# Patient Record
Sex: FEMALE | Race: WHITE | NOT HISPANIC OR LATINO | Employment: UNEMPLOYED | URBAN - METROPOLITAN AREA
[De-identification: names, ages, dates, MRNs, and addresses within clinical notes are randomized per-mention and may not be internally consistent; named-entity substitution may affect disease eponyms.]

---

## 2017-01-13 ENCOUNTER — HOSPITAL ENCOUNTER (OUTPATIENT)
Dept: RADIOLOGY | Facility: HOSPITAL | Age: 67
Discharge: HOME | End: 2017-01-13
Attending: OTOLARYNGOLOGY
Payer: MEDICARE

## 2017-01-13 DIAGNOSIS — R51.9 FACIAL PAIN: ICD-10-CM

## 2017-01-13 PROCEDURE — 70486 CT MAXILLOFACIAL W/O DYE: CPT | Mod: TC

## 2017-01-13 PROCEDURE — 70486 CT MAXILLOFACIAL W/O DYE: CPT | Mod: 26,,, | Performed by: RADIOLOGY

## 2017-01-19 RX ORDER — SUMATRIPTAN 50 MG/1
TABLET, FILM COATED ORAL
Qty: 10 TABLET | Refills: 2 | Status: SHIPPED | OUTPATIENT
Start: 2017-01-19 | End: 2017-02-03 | Stop reason: SDUPTHER

## 2017-01-27 RX ORDER — LEVOTHYROXINE SODIUM 75 UG/1
TABLET ORAL
Qty: 90 TABLET | Refills: 1 | Status: SHIPPED | OUTPATIENT
Start: 2017-01-27 | End: 2017-05-04 | Stop reason: SDUPTHER

## 2017-02-03 RX ORDER — SUMATRIPTAN 50 MG/1
TABLET, FILM COATED ORAL
Qty: 10 TABLET | Refills: 2 | Status: SHIPPED | OUTPATIENT
Start: 2017-02-03 | End: 2019-09-12

## 2017-02-03 NOTE — TELEPHONE ENCOUNTER
----- Message from Blaze Jaeger sent at 2/3/2017  4:09 PM CST -----  Contact: PT  PT has an appt on 6/12/17 she is not feeling well ,she would like to come in sooner.     She has unbalance, dizziness, and headaches. She also need a refill on butalbital-acetaminophen-caffeine.    Please call: [phone]

## 2017-02-08 ENCOUNTER — OFFICE VISIT (OUTPATIENT)
Dept: NEUROLOGY | Facility: CLINIC | Age: 67
End: 2017-02-08
Payer: MEDICARE

## 2017-02-08 VITALS — HEIGHT: 61 IN | BODY MASS INDEX: 24.31 KG/M2 | WEIGHT: 128.75 LBS

## 2017-02-08 DIAGNOSIS — G43.009 MIGRAINE WITHOUT AURA AND WITHOUT STATUS MIGRAINOSUS, NOT INTRACTABLE: Primary | ICD-10-CM

## 2017-02-08 PROCEDURE — 99999 PR PBB SHADOW E&M-EST. PATIENT-LVL III: CPT | Mod: PBBFAC,,, | Performed by: NEUROMUSCULOSKELETAL MEDICINE & OMM

## 2017-02-08 PROCEDURE — 1157F ADVNC CARE PLAN IN RCRD: CPT | Mod: S$GLB,,, | Performed by: NEUROMUSCULOSKELETAL MEDICINE & OMM

## 2017-02-08 PROCEDURE — 99214 OFFICE O/P EST MOD 30 MIN: CPT | Mod: S$GLB,,, | Performed by: NEUROMUSCULOSKELETAL MEDICINE & OMM

## 2017-02-08 PROCEDURE — 1126F AMNT PAIN NOTED NONE PRSNT: CPT | Mod: S$GLB,,, | Performed by: NEUROMUSCULOSKELETAL MEDICINE & OMM

## 2017-02-08 PROCEDURE — 1160F RVW MEDS BY RX/DR IN RCRD: CPT | Mod: S$GLB,,, | Performed by: NEUROMUSCULOSKELETAL MEDICINE & OMM

## 2017-02-08 PROCEDURE — 99499 UNLISTED E&M SERVICE: CPT | Mod: S$GLB,,, | Performed by: NEUROMUSCULOSKELETAL MEDICINE & OMM

## 2017-02-08 PROCEDURE — 1159F MED LIST DOCD IN RCRD: CPT | Mod: S$GLB,,, | Performed by: NEUROMUSCULOSKELETAL MEDICINE & OMM

## 2017-02-08 NOTE — PROGRESS NOTES
This history is dictated on [employer] word recognition program. There are word recognition mistakes that are occasionally missed on review.  History: Patient presents for follow-up for her headaches.  She's had 3 episodes in January of severe headaches 8/10 with associated vomiting.  Imitrex has not been helping as it used to.  She describes back of the head pain associated with a stuffy nose for which she uses saline nose spray.  Last 3 days she's been better and not as dizzy.  She notes that she had been cut back on the Premarin to half dose since the last 3 days as when she went back on the full dose of Premarin which seemed to reduce the headaches.  She was given a prescription for amitriptyline 10 mg by her OB/GYN doctor for the hot flashes however she has not started that yet.  She did not realize that this was a migraine prevention medicine and would probably benefit her in terms of reducing the migraines.  Sinus CT scan was negative other than showing maxillary sinus cyst which she will address with her ENT doctor.    Previous note 12-7-16: Patient presents for follow-up after several years. She complains of a bad headache last week with pressure on top of the head and a feeling of off balance to the right. Headache was associated with nausea and vomiting with intensity of the headache up to 9/10. She also has sinus problems which she thinks may trigger the headaches. Imitrex does help. She also has neck problems on a chronic basis. She has some Flexeril tablets which I've encouraged her to try for the neck problems. She is scheduled to see the ENT doctors for her sinus problems.            Gen. Appearance: Well-developed with no obvious deformities  Carotid arteries symmetrical pulses  Peripheral vascular shows symmetrical pulses with no obvious edema or tenderness  Neurological Exam:      Motor examination: Upper and Lower extremities - Normal and symmetrical;muscle tone was normal ;  right-handed  Sensory examination:Upper and lower extremities - Pinprick and soft touch were normal and symmetrical. Vibration sense was normal at the toes for age 15-20 seconds  Deep tendon reflexes were 1-2+ symmetrical. Both plantar responses were flexor  Cerebellar examination upper: Normal finger to nose and rapid alternating movements  Gait: Steady with no ataxia; heel and toe walk normal  Romberg test: negative Tandem gait: Normal   Involuntary movements: Negative  Cervical examination: Full range of motion with no pain Cervical tenderness:negative  Lumbar examination: Low back tenderness-negative Sciatic notch tenderness-negative Straight leg raising test-negative      Impression:Migraine headaches-rule out hormonal related to the Premarin dosage cutbacks; cervical syndrome; sinus headaches  Recommendations/plan: Start amitriptyline 10 mg at night with option to increase to 20 mg after 2 weeks.  Continue Imitrex 50- 100 mg for the severe headaches.  ; Followup in 6 months

## 2017-02-20 RX ORDER — AMLODIPINE BESYLATE AND OLMESARTAN MEDOXOMIL 5; 40 MG/1; MG/1
TABLET, FILM COATED ORAL
Qty: 90 TABLET | Refills: 0 | Status: SHIPPED | OUTPATIENT
Start: 2017-02-20 | End: 2017-06-01 | Stop reason: SDUPTHER

## 2017-02-23 ENCOUNTER — TELEPHONE (OUTPATIENT)
Dept: FAMILY MEDICINE | Facility: CLINIC | Age: 67
End: 2017-02-23

## 2017-02-23 NOTE — TELEPHONE ENCOUNTER
Patient called about PA on her DAYNA. 5-40  We did not receive a PA on this medication. Patient states what is she to do..Please advise Jonna [phone]

## 2017-02-23 NOTE — TELEPHONE ENCOUNTER
----- Message from Dawn Cisse sent at 2/22/2017 11:50 AM CST -----  Contact: self  Pt calling to request a PA for DAYNA 5-40 mg per tablet for [employer] [facility] .    Thanks .

## 2017-02-24 ENCOUNTER — TELEPHONE (OUTPATIENT)
Dept: FAMILY MEDICINE | Facility: CLINIC | Age: 67
End: 2017-02-24

## 2017-02-24 NOTE — TELEPHONE ENCOUNTER
----- Message from Mariaa Gaspar sent at 2/23/2017 12:54 PM CST -----  Contact: self  Pt is requesting a call back concerning a PA for DAYNA 5-40 mg per tablet. [phone]

## 2017-02-24 NOTE — TELEPHONE ENCOUNTER
Advise patient the medication this year must not be covered.  She probably needs to call her insurance and find out if that is the case.    The reason this is important is that if indeed it's not covered, there is no justification we can put on the prior authorization that will lead to an approval.  Next    The medication is 2 different blood pressure medication and we might be able to call in prescriptions separately

## 2017-03-30 ENCOUNTER — OFFICE VISIT (OUTPATIENT)
Dept: FAMILY MEDICINE | Facility: CLINIC | Age: 67
End: 2017-03-30
Payer: MEDICARE

## 2017-03-30 VITALS
HEIGHT: 61 IN | HEART RATE: 66 BPM | RESPIRATION RATE: 16 BRPM | DIASTOLIC BLOOD PRESSURE: 70 MMHG | OXYGEN SATURATION: 97 % | SYSTOLIC BLOOD PRESSURE: 108 MMHG | TEMPERATURE: 98 F | BODY MASS INDEX: 24.58 KG/M2 | WEIGHT: 130.19 LBS

## 2017-03-30 DIAGNOSIS — J01.90 ACUTE BACTERIAL RHINOSINUSITIS: Primary | ICD-10-CM

## 2017-03-30 DIAGNOSIS — B96.89 ACUTE BACTERIAL RHINOSINUSITIS: Primary | ICD-10-CM

## 2017-03-30 PROCEDURE — 1159F MED LIST DOCD IN RCRD: CPT | Mod: S$GLB,,, | Performed by: NURSE PRACTITIONER

## 2017-03-30 PROCEDURE — 1157F ADVNC CARE PLAN IN RCRD: CPT | Mod: S$GLB,,, | Performed by: NURSE PRACTITIONER

## 2017-03-30 PROCEDURE — 99214 OFFICE O/P EST MOD 30 MIN: CPT | Mod: S$GLB,,, | Performed by: NURSE PRACTITIONER

## 2017-03-30 PROCEDURE — 1126F AMNT PAIN NOTED NONE PRSNT: CPT | Mod: S$GLB,,, | Performed by: NURSE PRACTITIONER

## 2017-03-30 PROCEDURE — 3074F SYST BP LT 130 MM HG: CPT | Mod: S$GLB,,, | Performed by: NURSE PRACTITIONER

## 2017-03-30 PROCEDURE — 3078F DIAST BP <80 MM HG: CPT | Mod: S$GLB,,, | Performed by: NURSE PRACTITIONER

## 2017-03-30 PROCEDURE — 1160F RVW MEDS BY RX/DR IN RCRD: CPT | Mod: S$GLB,,, | Performed by: NURSE PRACTITIONER

## 2017-03-30 PROCEDURE — 99999 PR PBB SHADOW E&M-EST. PATIENT-LVL V: CPT | Mod: PBBFAC,,, | Performed by: NURSE PRACTITIONER

## 2017-03-30 RX ORDER — AMOXICILLIN AND CLAVULANATE POTASSIUM 875; 125 MG/1; MG/1
1 TABLET, FILM COATED ORAL 2 TIMES DAILY
Qty: 20 TABLET | Refills: 0 | Status: SHIPPED | OUTPATIENT
Start: 2017-03-30 | End: 2017-04-09

## 2017-03-30 RX ORDER — METHYLPREDNISOLONE 4 MG/1
TABLET ORAL
Qty: 1 PACKAGE | Refills: 0 | Status: SHIPPED | OUTPATIENT
Start: 2017-03-30 | End: 2017-06-20

## 2017-03-30 NOTE — PROGRESS NOTES
Patient Name: Aleksandra Santana    : 1950  MRN: [id number]    Subjective:  Aleksandra is a 66 y.o. female who presents today for     1. Sore throat, headache and pressure around the eyes x 4-5 days. She has chronic recurrent sinusitis, follow by ENT, recent CT scan 2017 reveal maxillary retention cyst. Taking tylenol and advil as well as xyzal without improvement.    Past Medical History  Past Medical History:   Diagnosis Date    Abnormal EKG     Anxiety     Cataract     GERD (gastroesophageal reflux disease)     History of colonic polyps     1 polyp  --5 yrs.    Hypertension     Hypothyroidism     IBS (irritable bowel syndrome)     Migraine syndrome     OA (osteoarthritis)     Osteopenia     BMD  --no tx -repeat 2 yrs    Osteoporosis, postmenopausal     Palpitations     SOB (shortness of breath)        Past Surgical History  Past Surgical History:   Procedure Laterality Date    APPENDECTOMY       SECTION      x2    HYSTERECTOMY  age 33    COLEEN/BSO for endometriosis    TONSILLECTOMY         Family History  Family History   Problem Relation Age of Onset    Cataracts Mother     No Known Problems Father     No Known Problems Sister     No Known Problems Brother     No Known Problems Maternal Aunt     No Known Problems Maternal Uncle     No Known Problems Paternal Aunt     No Known Problems Paternal Uncle     No Known Problems Maternal Grandmother     No Known Problems Maternal Grandfather     No Known Problems Paternal Grandmother     No Known Problems Paternal Grandfather     Colon cancer Neg Hx     Breast cancer Neg Hx     Ovarian cancer Neg Hx        Social History  Social History     Social History    Marital status:      Spouse name: N/A    Number of children: N/A    Years of education: N/A     Occupational History    Not on file.     Social History Main Topics    Smoking status: Never Smoker    Smokeless tobacco: Not on file    Alcohol use 0.6  oz/week     1 Glasses of wine per week      Comment: ocasionally    Drug use: No    Sexual activity: Not Currently     Partners: Male     Birth control/ protection: None, Post-menopausal     Other Topics Concern    Not on file     Social History Narrative       Allergies  Review of patient's allergies indicates:   Allergen Reactions    Codeine     Iodine and iodide containing products     Iodinated contrast media - iv dye      Rash (skin)^    Doxycycline Rash    -reviewed and updated      Medications  Reviewed and updated.   Current Outpatient Prescriptions   Medication Sig Dispense Refill    DAYNA 5-40 mg per tablet TAKE ONE TABLET BY MOUTH EVERY DAY 90 tablet 0    carvedilol (COREG) 25 MG tablet TAKE ONE TABLET BY MOUTH EVERY DAY 30 tablet 12    folic acid (FOLVITE) 1 MG tablet Take 1 tablet (1,000 mcg total) by mouth once daily. 90 tablet 4    hydrochlorothiazide (HYDRODIURIL) 25 MG tablet Take 1 tablet (25 mg total) by mouth once daily. 90 tablet 4    omeprazole (PRILOSEC) 20 MG capsule Take 1 capsule (20 mg total) by mouth before breakfast. 90 capsule 3    PREMARIN 1.25 mg tablet TAKE ONE TABLET BY MOUTH EVERY DAY 30 tablet 6    SYNTHROID 75 mcg tablet TAKE ONE TABLET BY MOUTH EVERY DAY 90 tablet 1    amitriptyline (ELAVIL) 10 MG tablet 10 mg nightly x 7 days.  Add 10 mg every week until symptoms better.  Max 50 mg nightly. 60 tablet 11    amoxicillin-clavulanate 875-125mg (AUGMENTIN) 875-125 mg per tablet Take 1 tablet by mouth 2 (two) times daily. 20 tablet 0    azelastine (ASTELIN) 137 mcg (0.1 %) nasal spray USE 1-2 SPRAYS IN EACH NOSTRIL TWICE DAILY AS NEEDED RHINITIS 30 mL 5    butalbital-acetaminophen-caffeine -40 mg (FIORICET, ESGIC) -40 mg per tablet Take 1 tablet by mouth every 6 (six) hours as needed for Pain. 30 tablet 3    conjugated estrogens (PREMARIN) vaginal cream Place 0.5 g vaginally twice a week. 30 g 11    fluticasone (FLONASE) 50 mcg/actuation nasal spray 2  "sprays by Each Nare route once daily. 1 Bottle 5    levocetirizine (XYZAL) 5 MG tablet       meclizine (ANTIVERT) 25 mg tablet Take 1 tablet (25 mg total) by mouth 3 (three) times daily as needed for Dizziness or Nausea. 30 tablet 0    meloxicam (MOBIC) 15 MG tablet TAKE ONE TABLET BY MOUTH EVERY DAY 30 tablet 2    methylPREDNISolone (MEDROL DOSEPACK) 4 mg tablet use as directed 1 Package 0    sumatriptan (IMITREX) 50 MG tablet Take one tablet at onset of severe migraine.  May repeat in 2 hours but not to exceed 2 tablets in 24 hours 10 tablet 2     No current facility-administered medications for this visit.          Review of Systems   Constitutional: Positive for chills, fever and malaise/fatigue.   HENT: Positive for congestion and sore throat. Negative for ear pain.    Respiratory: Positive for cough. Negative for sputum production and shortness of breath.    Cardiovascular: Negative for chest pain.   Musculoskeletal: Positive for myalgias.   Neurological: Positive for headaches.         Physical Exam  /70 (BP Location: Left arm, Patient Position: Sitting, BP Method: Manual)  Pulse 66  Temp 97.7 °F (36.5 °C) (Oral)   Resp 16  Ht 5' 1" (1.549 m)  Wt 59.1 kg (130 lb 2.9 oz)  SpO2 97%  BMI 24.6 kg/m2  Physical Exam   Constitutional: She appears well-developed. No distress.   HENT:   Head: Normocephalic.   Right Ear: Tympanic membrane normal.   Left Ear: Tympanic membrane normal.   Nose: Mucosal edema present.   Mouth/Throat: No posterior oropharyngeal edema or posterior oropharyngeal erythema.   Cardiovascular: Normal rate, regular rhythm and normal heart sounds.    Pulmonary/Chest: Effort normal and breath sounds normal.   Skin: She is not diaphoretic.         Assessment/Plan:  Aleksandra Santana is a 66 y.o. female who presents today for :    Acute bacterial rhinosinusitis  Continue xyzal, flonase astelin to reduce recurrence, start abx  -     amoxicillin-clavulanate 875-125mg (AUGMENTIN) 875-125 " mg per tablet; Take 1 tablet by mouth 2 (two) times daily.  Dispense: 20 tablet; Refill: 0  -     methylPREDNISolone (MEDROL DOSEPACK) 4 mg tablet; use as directed  Dispense: 1 Package; Refill: 0        Return if symptoms worsen or fail to improve in 3-5 days with ENT.

## 2017-03-30 NOTE — MR AVS SNAPSHOT
[facility]  [address]  [place] [address]  Phone: [phone]  Fax: [phone]                  Aleksandra Santana   3/30/2017 6:30 PM   Office Visit    Description:  Female : 1950   Provider:  Carline Larios NP   Department:  [facility]           Reason for Visit     Sore Throat           Diagnoses this Visit        Comments    Acute bacterial rhinosinusitis    -  Primary            To Do List           Future Appointments        Provider Department Dept Phone    2017 10:40 AM Ilya Carrington MD [facility] - Neurology [phone]      Goals (5 Years of Data)     None      Follow-Up and Disposition     Return if symptoms worsen or fail to improve in 3-5 days.       These Medications        Disp Refills Start End    amoxicillin-clavulanate 875-125mg (AUGMENTIN) 875-125 mg per tablet 20 tablet 0 3/30/2017 2017    Take 1 tablet by mouth 2 (two) times daily. - Oral    Pharmacy: [facility] [address] Ph #: [phone]       methylPREDNISolone (MEDROL DOSEPACK) 4 mg tablet 1 Package 0 3/30/2017     use as directed    Pharmacy: [address] Ph #: [phone]         [facility]     [facility] Care Line - 24/ Assistance  Unless otherwise directed by your provider, please contact [facility], our nurse care line that is available for 24/7 assistance.     Registered nurses in the [facility] provide: appointment scheduling, clinical advisement, health education, and other advisory services.  Call: [phone] (toll free)               Medications           Message regarding Medications     Verify the changes and/or additions to your medication regime listed below are the same as discussed with your clinician today.  If any of these changes or additions are incorrect, please notify your healthcare provider.        START taking  these NEW medications        Refills    amoxicillin-clavulanate 875-125mg (AUGMENTIN) 875-125 mg per tablet 0    Sig: Take 1 tablet by mouth 2 (two) times daily.    Class: Print    Route: Oral    methylPREDNISolone (MEDROL DOSEPACK) 4 mg tablet 0    Sig: use as directed    Class: Print      STOP taking these medications     butalbital-aspirin-caffeine -40 mg (FIORINAL) -40 mg Cap Take 1 capsule by mouth.    promethazine (PHENERGAN) 12.5 MG Tab Take 1 tablet (12.5 mg total) by mouth every 6 (six) hours as needed.           Verify that the below list of medications is an accurate representation of the medications you are currently taking.  If none reported, the list may be blank. If incorrect, please contact your healthcare provider. Carry this list with you in case of emergency.           Current Medications     DAYNA 5-40 mg per tablet TAKE ONE TABLET BY MOUTH EVERY DAY    carvedilol (COREG) 25 MG tablet TAKE ONE TABLET BY MOUTH EVERY DAY    folic acid (FOLVITE) 1 MG tablet Take 1 tablet (1,000 mcg total) by mouth once daily.    hydrochlorothiazide (HYDRODIURIL) 25 MG tablet Take 1 tablet (25 mg total) by mouth once daily.    omeprazole (PRILOSEC) 20 MG capsule Take 1 capsule (20 mg total) by mouth before breakfast.    PREMARIN 1.25 mg tablet TAKE ONE TABLET BY MOUTH EVERY DAY    SYNTHROID 75 mcg tablet TAKE ONE TABLET BY MOUTH EVERY DAY    amitriptyline (ELAVIL) 10 MG tablet 10 mg nightly x 7 days.  Add 10 mg every week until symptoms better.  Max 50 mg nightly.    amoxicillin-clavulanate 875-125mg (AUGMENTIN) 875-125 mg per tablet Take 1 tablet by mouth 2 (two) times daily.    azelastine (ASTELIN) 137 mcg (0.1 %) nasal spray USE 1-2 SPRAYS IN EACH NOSTRIL TWICE DAILY AS NEEDED RHINITIS    butalbital-acetaminophen-caffeine -40 mg (FIORICET, ESGIC) -40 mg per tablet Take 1 tablet by mouth every 6 (six) hours as needed for Pain.    conjugated estrogens (PREMARIN) vaginal cream Place 0.5 g  "vaginally twice a week.    fluticasone (FLONASE) 50 mcg/actuation nasal spray 2 sprays by Each Nare route once daily.    levocetirizine (XYZAL) 5 MG tablet     meclizine (ANTIVERT) 25 mg tablet Take 1 tablet (25 mg total) by mouth 3 (three) times daily as needed for Dizziness or Nausea.    meloxicam (MOBIC) 15 MG tablet TAKE ONE TABLET BY MOUTH EVERY DAY    methylPREDNISolone (MEDROL DOSEPACK) 4 mg tablet use as directed    sumatriptan (IMITREX) 50 MG tablet Take one tablet at onset of severe migraine.  May repeat in 2 hours but not to exceed 2 tablets in 24 hours           Clinical Reference Information           Your Vitals Were     BP Pulse Temp Resp Height Weight    108/70 (BP Location: Left arm, Patient Position: Sitting, BP Method: Manual) 66 97.7 °F (36.5 °C) (Oral) 16 5' 1" (1.549 m) 59.1 kg (130 lb 2.9 oz)    SpO2 BMI             97% 24.6 kg/m2         Blood Pressure          Most Recent Value    BP  108/70      Allergies as of 3/30/2017     Codeine    Iodine And Iodide Containing Products    Iodinated Contrast Media - Iv Dye    Doxycycline      Immunizations Administered on Date of Encounter - 3/30/2017     None      Instructions      Sinusitis (Antibiotic Treatment)    The sinuses are air-filled spaces within the bones of the face. They connect to the inside of the nose. Sinusitis is an inflammation of the tissue lining the sinus cavity. Sinus inflammation can occur during a cold. It can also be due to allergies to pollens and other particles in the air. Sinusitis can cause symptoms of sinus congestion and fullness. A sinus infection causes fever, headache and facial pain. There is often green or yellow drainage from the nose or into the back of the throat (post-nasal drip). You have been given antibiotics to treat this condition.  Home care:  · Take the full course of antibiotics as instructed. Do not stop taking them, even if you feel better.  · Drink plenty of water, hot tea, and other liquids. This " may help thin mucus. It also may promote sinus drainage.  · Heat may help soothe painful areas of the face. Use a towel soaked in hot water. Or,  the shower and direct the hot spray onto your face. Using a vaporizer along with a menthol rub at night may also help.   · An expectorant containing guaifenesin may help thin the mucus and promote drainage from the sinuses.  · Over-the-counter decongestants may be used unless a similar medicine was prescribed. Nasal sprays work the fastest. Use one that contains phenylephrine or oxymetazoline. First blow the nose gently. Then use the spray. Do not use these medicines more often than directed on the label or symptoms may get worse. You may also use tablets containing pseudoephedrine. Avoid products that combine ingredients, because side effects may be increased. Read labels. You can also ask the pharmacist for help. (NOTE: Persons with high blood pressure should not use decongestants. They can raise blood pressure.)  · Over-the-counter antihistamines may help if allergies contributed to your sinusitis.    · Do not use nasal rinses or irrigation during an acute sinus infection, unless told to by your health care provider. Rinsing may spread the infection to other sinuses.  · Use acetaminophen or ibuprofen to control pain, unless another pain medicine was prescribed. (If you have chronic liver or kidney disease or ever had a stomach ulcer, talk with your doctor before using these medicines. Aspirin should never be used in anyone under 18 years of age who is ill with a fever. It may cause severe liver damage.)  · Don't smoke. This can worsen symptoms.  Follow-up care  Follow up with your healthcare provider or our staff if you are not improving within the next week.  When to seek medical advice  Call your healthcare provider if any of these occur:  · Facial pain or headache becoming more severe  · Stiff neck  · Unusual drowsiness or confusion  · Swelling of the  forehead or eyelids  · Vision problems, including blurred or double vision  · Fever of 100.4ºF (38ºC) or higher, or as directed by your healthcare provider  · Seizure  · Breathing problems  · Symptoms not resolving within 10 days  Date Last Reviewed: 4/13/2015 © 2000-2016 [employer]. [address] [place] [address], [place] [address]. All rights reserved. This information is not intended as a substitute for professional medical care. Always follow your healthcare professional's instructions.             Language Assistance Services     ATTENTION: Language assistance services are available, free of charge. Please call [phone].      ATENCIÓN: Si habla español, tiene a singh disposición servicios gratuitos de asistencia lingüística. Llame al [phone].     CHÚ Ý: N?u b?n nói Ti?ng Vi?t, có các d?ch v? h? tr? ngôn ng? mi?n phí dành cho b?n. G?i s? [phone].         [facility] complies with applicable Federal civil rights laws and does not discriminate on the basis of race, color, national origin, age, disability, or sex.

## 2017-03-30 NOTE — PATIENT INSTRUCTIONS
Sinusitis (Antibiotic Treatment)    The sinuses are air-filled spaces within the bones of the face. They connect to the inside of the nose. Sinusitis is an inflammation of the tissue lining the sinus cavity. Sinus inflammation can occur during a cold. It can also be due to allergies to pollens and other particles in the air. Sinusitis can cause symptoms of sinus congestion and fullness. A sinus infection causes fever, headache and facial pain. There is often green or yellow drainage from the nose or into the back of the throat (post-nasal drip). You have been given antibiotics to treat this condition.  Home care:  · Take the full course of antibiotics as instructed. Do not stop taking them, even if you feel better.  · Drink plenty of water, hot tea, and other liquids. This may help thin mucus. It also may promote sinus drainage.  · Heat may help soothe painful areas of the face. Use a towel soaked in hot water. Or,  the shower and direct the hot spray onto your face. Using a vaporizer along with a menthol rub at night may also help.   · An expectorant containing guaifenesin may help thin the mucus and promote drainage from the sinuses.  · Over-the-counter decongestants may be used unless a similar medicine was prescribed. Nasal sprays work the fastest. Use one that contains phenylephrine or oxymetazoline. First blow the nose gently. Then use the spray. Do not use these medicines more often than directed on the label or symptoms may get worse. You may also use tablets containing pseudoephedrine. Avoid products that combine ingredients, because side effects may be increased. Read labels. You can also ask the pharmacist for help. (NOTE: Persons with high blood pressure should not use decongestants. They can raise blood pressure.)  · Over-the-counter antihistamines may help if allergies contributed to your sinusitis.    · Do not use nasal rinses or irrigation during an acute sinus infection, unless told to by  your health care provider. Rinsing may spread the infection to other sinuses.  · Use acetaminophen or ibuprofen to control pain, unless another pain medicine was prescribed. (If you have chronic liver or kidney disease or ever had a stomach ulcer, talk with your doctor before using these medicines. Aspirin should never be used in anyone under 18 years of age who is ill with a fever. It may cause severe liver damage.)  · Don't smoke. This can worsen symptoms.  Follow-up care  Follow up with your healthcare provider or our staff if you are not improving within the next week.  When to seek medical advice  Call your healthcare provider if any of these occur:  · Facial pain or headache becoming more severe  · Stiff neck  · Unusual drowsiness or confusion  · Swelling of the forehead or eyelids  · Vision problems, including blurred or double vision  · Fever of 100.4ºF (38ºC) or higher, or as directed by your healthcare provider  · Seizure  · Breathing problems  · Symptoms not resolving within 10 days  Date Last Reviewed: 4/13/2015  © 5896-9213 [employer]. [address] [place] [address], [place] [address]. All rights reserved. This information is not intended as a substitute for professional medical care. Always follow your healthcare professional's instructions.

## 2017-04-07 ENCOUNTER — TELEPHONE (OUTPATIENT)
Dept: FAMILY MEDICINE | Facility: CLINIC | Age: 67
End: 2017-04-07

## 2017-04-07 ENCOUNTER — NURSE TRIAGE (OUTPATIENT)
Dept: ADMINISTRATIVE | Facility: CLINIC | Age: 67
End: 2017-04-07

## 2017-04-07 DIAGNOSIS — B96.89 ACUTE BACTERIAL RHINOSINUSITIS: Primary | ICD-10-CM

## 2017-04-07 DIAGNOSIS — J01.90 ACUTE BACTERIAL RHINOSINUSITIS: Primary | ICD-10-CM

## 2017-04-07 RX ORDER — LEVOFLOXACIN 500 MG/1
500 TABLET, FILM COATED ORAL DAILY
Qty: 7 TABLET | Refills: 0 | Status: SHIPPED | OUTPATIENT
Start: 2017-04-07 | End: 2017-04-14

## 2017-04-07 NOTE — TELEPHONE ENCOUNTER
----- Message from Shruthi Odell sent at 4/7/2017  2:37 PM CDT -----  Contact: Self  Patient says she went to the walk in Clinic and the medication she was given is not working . Please call [phone]

## 2017-04-07 NOTE — TELEPHONE ENCOUNTER
Reason for Disposition   Caller has cancelled the call before the first contact.    Protocols used: ST NO CONTACT OR DUPLICATE CONTACT CALL-A-AH    Aleksandra does not want to speak with triage nurse.  She was trying to reach Carline Larios NP in [place] [facility].  She left two calls for her and is waiting for a call back from her to discuss trying another antibiotic.  Declined offer of triage or appointment set for her.  Message to Ms Larios.  Please contact caller directly with any additional care advice.

## 2017-04-07 NOTE — TELEPHONE ENCOUNTER
Reason for Disposition   Caller has already spoken with another triager or PCP AND has further questions AND triager able to answer questions.    Protocols used: ST NO CONTACT OR DUPLICATE CONTACT CALL-A-AH    Patient cannot take medication ordered today in clinic. Another triage nurse took the call and sent a message to the provider for assistance.

## 2017-04-11 ENCOUNTER — TELEPHONE (OUTPATIENT)
Dept: FAMILY MEDICINE | Facility: CLINIC | Age: 67
End: 2017-04-11

## 2017-04-11 RX ORDER — AMOXICILLIN 875 MG/1
875 TABLET, FILM COATED ORAL 2 TIMES DAILY
Qty: 20 TABLET | Refills: 0 | Status: SHIPPED | OUTPATIENT
Start: 2017-04-11 | End: 2017-04-21

## 2017-04-11 NOTE — TELEPHONE ENCOUNTER
----- Message from Yg Hinkle sent at 4/10/2017  4:24 PM CDT -----  Contact: Self  Pt states amoxicillin-clavulanate 875-125mg (AUGMENTIN) 875-125 mg per tablet is too strong. Pt wants to get Rx for amoxiciilin 800 MG.  Pt states she does not want to take levoFLOXacin (LEVAQUIN) 500 MG tablet.  Pt would like script sent to [facility]. /[phone]. Pt can be reached @ [phone]

## 2017-04-11 NOTE — PROGRESS NOTES
Pt reports augmentin upset stomach, has not started levaquin, switch to amoxil 875 mg bid x 10 days, sent to pharmacy

## 2017-04-11 NOTE — TELEPHONE ENCOUNTER
Left VM informing pt we have tried to reach her back without success. Levaquin was sent into pharmacy Friday 4/7. Pt to return call if she has persistent problems or concerns

## 2017-05-05 RX ORDER — LEVOTHYROXINE SODIUM 75 UG/1
TABLET ORAL
Qty: 90 TABLET | Refills: 1 | Status: SHIPPED | OUTPATIENT
Start: 2017-05-05 | End: 2018-04-19 | Stop reason: SDUPTHER

## 2017-05-31 ENCOUNTER — TELEPHONE (OUTPATIENT)
Dept: FAMILY MEDICINE | Facility: CLINIC | Age: 67
End: 2017-05-31

## 2017-05-31 DIAGNOSIS — I10 ESSENTIAL HYPERTENSION: ICD-10-CM

## 2017-05-31 DIAGNOSIS — Z00.00 ROUTINE MEDICAL EXAM: Primary | ICD-10-CM

## 2017-05-31 DIAGNOSIS — E03.9 HYPOTHYROIDISM, UNSPECIFIED TYPE: ICD-10-CM

## 2017-05-31 DIAGNOSIS — K59.09 CHRONIC CONSTIPATION: ICD-10-CM

## 2017-05-31 DIAGNOSIS — E55.9 VITAMIN D DEFICIENCY DISEASE: ICD-10-CM

## 2017-06-01 ENCOUNTER — HOSPITAL ENCOUNTER (OUTPATIENT)
Dept: RADIOLOGY | Facility: HOSPITAL | Age: 67
Discharge: HOME | End: 2017-06-01
Attending: ORTHOPAEDIC SURGERY
Payer: MEDICARE

## 2017-06-01 ENCOUNTER — OFFICE VISIT (OUTPATIENT)
Dept: ORTHOPEDICS | Facility: CLINIC | Age: 67
End: 2017-06-01
Payer: MEDICARE

## 2017-06-01 VITALS
SYSTOLIC BLOOD PRESSURE: 148 MMHG | HEART RATE: 62 BPM | DIASTOLIC BLOOD PRESSURE: 69 MMHG | BODY MASS INDEX: 24.6 KG/M2 | WEIGHT: 130.31 LBS | HEIGHT: 61 IN | RESPIRATION RATE: 16 BRPM

## 2017-06-01 DIAGNOSIS — G54.2 CERVICAL SYNDROME: ICD-10-CM

## 2017-06-01 DIAGNOSIS — M25.522 LEFT ELBOW PAIN: ICD-10-CM

## 2017-06-01 DIAGNOSIS — M25.522 LEFT ELBOW PAIN: Primary | ICD-10-CM

## 2017-06-01 DIAGNOSIS — M89.8X1 PAIN OF LEFT SCAPULA: ICD-10-CM

## 2017-06-01 PROCEDURE — 73080 X-RAY EXAM OF ELBOW: CPT | Mod: TC,LT

## 2017-06-01 PROCEDURE — 99999 PR PBB SHADOW E&M-EST. PATIENT-LVL V: CPT | Mod: PBBFAC,,, | Performed by: PHYSICIAN ASSISTANT

## 2017-06-01 PROCEDURE — 99213 OFFICE O/P EST LOW 20 MIN: CPT | Mod: S$GLB,,, | Performed by: PHYSICIAN ASSISTANT

## 2017-06-01 PROCEDURE — 73010 X-RAY EXAM OF SHOULDER BLADE: CPT | Mod: 26,LT,, | Performed by: RADIOLOGY

## 2017-06-01 PROCEDURE — 73080 X-RAY EXAM OF ELBOW: CPT | Mod: 26,LT,, | Performed by: RADIOLOGY

## 2017-06-01 PROCEDURE — 73010 X-RAY EXAM OF SHOULDER BLADE: CPT | Mod: TC,LT

## 2017-06-01 PROCEDURE — 1159F MED LIST DOCD IN RCRD: CPT | Mod: S$GLB,,, | Performed by: PHYSICIAN ASSISTANT

## 2017-06-01 PROCEDURE — 1125F AMNT PAIN NOTED PAIN PRSNT: CPT | Mod: S$GLB,,, | Performed by: PHYSICIAN ASSISTANT

## 2017-06-01 RX ORDER — CELECOXIB 200 MG/1
200 CAPSULE ORAL 2 TIMES DAILY
Qty: 60 CAPSULE | Refills: 1 | Status: SHIPPED | OUTPATIENT
Start: 2017-06-01 | End: 2018-04-18 | Stop reason: SDUPTHER

## 2017-06-01 RX ORDER — AMLODIPINE BESYLATE AND OLMESARTAN MEDOXOMIL 5; 40 MG/1; MG/1
TABLET, FILM COATED ORAL
Qty: 90 TABLET | Refills: 1 | Status: SHIPPED | OUTPATIENT
Start: 2017-06-01 | End: 2017-06-20

## 2017-06-01 RX ORDER — AMLODIPINE AND OLMESARTAN MEDOXOMIL 5; 40 MG/1; MG/1
1 TABLET ORAL DAILY
Qty: 90 TABLET | Refills: 0 | Status: SHIPPED | OUTPATIENT
Start: 2017-06-01 | End: 2018-02-02 | Stop reason: SDUPTHER

## 2017-06-01 NOTE — PROGRESS NOTES
Chief Complaint & History of Present Illness:  Aleksandra Santana is a Established patient 67 y.o. female who is seen here today with a complaint of    Chief Complaint   Patient presents with    Left Elbow - Pain    Left Shoulder - Pain    .  She reports she's developed pain and tenderness in the shoulder radiating down the arm to and past the elbow over the past several weeks.  There's been no specific trauma or injury to the area.  She was seen by an outside orthopedist last week and has undergone cervical spine x-rays as well as shoulder and elbow.  She received a cortisone injection of the scapular region but has had little relief from this  On a scale of 1-10, with 10 being worst pain imaginable, he rates this pain as 4 on good days and 7 on bad days.  she describes the pain as tender and sore.    Review of patient's allergies indicates:   Allergen Reactions    Codeine     Iodine and iodide containing products     Iodinated contrast media - oral and iv dye      Rash (skin)^    Doxycycline Rash         Current Outpatient Prescriptions   Medication Sig Dispense Refill    amitriptyline (ELAVIL) 10 MG tablet 10 mg nightly x 7 days.  Add 10 mg every week until symptoms better.  Max 50 mg nightly. 60 tablet 11    amlodipine-olmesartan (DAYNA) 5-40 mg per tablet Take 1 tablet by mouth once daily. 90 tablet 0    azelastine (ASTELIN) 137 mcg (0.1 %) nasal spray USE 1-2 SPRAYS IN EACH NOSTRIL TWICE DAILY AS NEEDED RHINITIS 30 mL 5    DAYNA 5-40 mg per tablet TAKE ONE TABLET BY MOUTH EVERY DAY 90 tablet 1    butalbital-acetaminophen-caffeine -40 mg (FIORICET, ESGIC) -40 mg per tablet Take 1 tablet by mouth every 6 (six) hours as needed for Pain. 30 tablet 3    carvedilol (COREG) 25 MG tablet TAKE ONE TABLET BY MOUTH EVERY DAY 30 tablet 12    conjugated estrogens (PREMARIN) vaginal cream Place 0.5 g vaginally twice a week. 30 g 11    fluticasone (FLONASE) 50 mcg/actuation nasal spray 2 sprays by Each  Nare route once daily. 1 Bottle 5    folic acid (FOLVITE) 1 MG tablet Take 1 tablet (1,000 mcg total) by mouth once daily. 90 tablet 4    hydrochlorothiazide (HYDRODIURIL) 25 MG tablet Take 1 tablet (25 mg total) by mouth once daily. 90 tablet 4    levocetirizine (XYZAL) 5 MG tablet       meclizine (ANTIVERT) 25 mg tablet Take 1 tablet (25 mg total) by mouth 3 (three) times daily as needed for Dizziness or Nausea. 30 tablet 0    methylPREDNISolone (MEDROL DOSEPACK) 4 mg tablet use as directed 1 Package 0    omeprazole (PRILOSEC) 20 MG capsule Take 1 capsule (20 mg total) by mouth before breakfast. 90 capsule 3    PREMARIN 1.25 mg tablet TAKE ONE TABLET BY MOUTH EVERY DAY 30 tablet 6    sumatriptan (IMITREX) 50 MG tablet Take one tablet at onset of severe migraine.  May repeat in 2 hours but not to exceed 2 tablets in 24 hours 10 tablet 2    SYNTHROID 75 mcg tablet TAKE ONE TABLET BY MOUTH EVERY DAY 90 tablet 1    celecoxib (CELEBREX) 200 MG capsule Take 1 capsule (200 mg total) by mouth 2 (two) times daily. 60 capsule 1     No current facility-administered medications for this visit.        Past Medical History:   Diagnosis Date    Abnormal EKG     Anxiety     Cataract     GERD (gastroesophageal reflux disease)     History of colonic polyps     1 polyp  --5 yrs.    Hypertension     Hypothyroidism     IBS (irritable bowel syndrome)     Migraine syndrome     OA (osteoarthritis)     Osteopenia     BMD  --no tx -repeat 2 yrs    Osteoporosis, postmenopausal     Palpitations     SOB (shortness of breath)        Past Surgical History:   Procedure Laterality Date    APPENDECTOMY       SECTION      x2    HYSTERECTOMY  age 33    COLEEN/BSO for endometriosis    TONSILLECTOMY         Vital Signs (Most Recent)  Vitals:    17 1600   BP: (!) 148/69   Pulse: 62   Resp: 16           Review of Systems:  ROS:  Constitutional: no fever or chills  Eyes: no visual changes  ENT: no nasal  "congestion or sore throat  Respiratory: no cough or shortness of breath  Cardiovascular: no chest pain or palpitations  Gastrointestinal: no nausea or vomiting, tolerating diet  Genitourinary: no hematuria or dysuria  Integument/Breast: no rash or pruritis  Hematologic/Lymphatic: no easy bruising or lymphadenopathy  Musculoskeletal: no arthralgias or myalgias  Neurological: no seizures or tremors  Behavioral/Psych: no auditory or visual hallucinations  Endocrine: no heat or cold intolerance              OBJECTIVE:     PHYSICAL EXAM:  Height: 5' 1" (154.9 cm) Weight: 59.1 kg (130 lb 4.7 oz), General Appearance: Well nourished, well developed, in no acute distress.  Neurological: Mood & affect are normal.  left Elbow:   History of injury: no  Pain: Description: moderate and intermittent  Night pain: yes and if sleeps on affected side  Rest pain: yes and moderate  Quality: sharp and tender  Location: diffuse and medial  Radiates to: arm and neck  Mass/swelling: None  Neurological complaints: none and numbness intermittently in the hand  medial epicondylar tenderness, positive Cozen  ROM: flexion arc 0-145  Strength: flexion grade 5 of 5 and extension grade 5 of 5      RADIOGRAPHS:  X-rays of the scapula and elbow taken today films reviewed by me demonstrate no evidence of fracture dislocation or advanced degenerative joint disease throughout the joints no evidence of a loss of joint space or damage normal x-rays    ASSESSMENT/PLAN:     Plan: We discussed with the patient at length all the different treatment options available for her elbow including anti-inflammatories, acetaminophen, rest, ice, physical therapy to include strengthening, range of motion exercise,  splinting,  occasional cortisone injections for temporary relief,  or possible surgical interventions.  It appears her cervical spine is a origin of her pain is been reinforced by outside orthopedic surgeon for the short-term we will prescribe a Celebrex for " inflammation and tenderness compounded pain cream for the elbow pain consult to spine service for evaluation of cervical spine and treatment accordingly

## 2017-06-19 ENCOUNTER — OFFICE VISIT (OUTPATIENT)
Dept: NEUROLOGY | Facility: CLINIC | Age: 67
End: 2017-06-19
Payer: MEDICARE

## 2017-06-19 VITALS
HEIGHT: 61 IN | SYSTOLIC BLOOD PRESSURE: 123 MMHG | WEIGHT: 129.19 LBS | DIASTOLIC BLOOD PRESSURE: 64 MMHG | HEART RATE: 67 BPM | BODY MASS INDEX: 24.39 KG/M2

## 2017-06-19 DIAGNOSIS — G43.009 MIGRAINE WITHOUT AURA AND WITHOUT STATUS MIGRAINOSUS, NOT INTRACTABLE: Primary | ICD-10-CM

## 2017-06-19 PROCEDURE — 99999 PR PBB SHADOW E&M-EST. PATIENT-LVL IV: CPT | Mod: PBBFAC,,, | Performed by: NEUROMUSCULOSKELETAL MEDICINE & OMM

## 2017-06-19 PROCEDURE — 1126F AMNT PAIN NOTED NONE PRSNT: CPT | Mod: S$GLB,,, | Performed by: NEUROMUSCULOSKELETAL MEDICINE & OMM

## 2017-06-19 PROCEDURE — 99499 UNLISTED E&M SERVICE: CPT | Mod: S$GLB,,, | Performed by: NEUROMUSCULOSKELETAL MEDICINE & OMM

## 2017-06-19 PROCEDURE — 99214 OFFICE O/P EST MOD 30 MIN: CPT | Mod: S$GLB,,, | Performed by: NEUROMUSCULOSKELETAL MEDICINE & OMM

## 2017-06-19 PROCEDURE — 1159F MED LIST DOCD IN RCRD: CPT | Mod: S$GLB,,, | Performed by: NEUROMUSCULOSKELETAL MEDICINE & OMM

## 2017-06-19 RX ORDER — LIDOCAINE AND PRILOCAINE 25; 25 MG/G; MG/G
CREAM TOPICAL
COMMUNITY
Start: 2017-06-02 | End: 2017-06-19

## 2017-06-19 NOTE — PROGRESS NOTES
Progress Notes        This history is dictated on [employer] Natural Speaking word recognition program. There are word recognition mistakes that are occasionally missed on review.  History: Patient presents for follow-up for her headaches and new arm pain.  She complains of left shoulder p pain radiating into the arm and hand.  She was seen by orthopedics 3 weeks ago and had an injection in the shoulder blade area.  This seemed to help some.  The left arm pain occasionally will radiate down to the 2 middle fingers with tingling.  She is scheduled for pain management in July.  She notes that moving her neck to the left increases the left sciatic pain.  She was recently changed from meloxicam to Celebrex.  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  Overall her migraines have been much better.  Previous note: 2-7-17:  Patient presents for follow-up after several years.  She complains of a bad headache last week with pressure on top of the head and a feeling of off balance to the right.  Headache was associated with nausea and vomiting with intensity of the headache up to 9/10.  She also has sinus problems which she thinks may trigger the headaches.  Imitrex does help.  She also has neck problems on a chronic basis.  She has some Flexeril tablets which I've encouraged her to try for the neck problems.  She is scheduled to see the ENT doctors for her sinus problems.          Gen. Appearance: Well-developed with no obvious deformities  Carotid arteries symmetrical pulses  Peripheral vascular shows symmetrical pulses with no obvious edema or tenderness  Neurological Exam:  Mental status-alert and oriented to person, place, and time; attention span and concentration is good. Fund of knowledge-patient is aware of current events and able to give detailed history of the current problem.recent and remote memory seems intact. Language function is normal with no evidence of aphasia     Cranial nerves:Visual  acuity to hand chart -normal; visual fields to confrontation normal;pupils were equal and reactive to light ; funduscopic examination was normal with sharp disc margins. external ocular movements were full with no nystagmus. Facial sensation to pinprick and corneal reflexes intact; Facial muscles were symmetrical. Hearing is unimpaired symmetrical finger rub; Tongue and palate movements were normal with swallowing unimpaired. Shoulder shrug was intact with good strength Speech was normal     Motor examination: Upper and Lower extremities - Normal and symmetrical;muscle tone was normal ; right-handed  Sensory examination:Upper and lower extremities - Pinprick and soft touch were normal and symmetrical. Vibration sense was normal at the toes for age 15-20 seconds  Deep tendon reflexes were 1-2+ symmetrical. Both plantar responses were flexor  Cerebellar examination upper: Normal finger to nose and rapid alternating movements  Gait: Steady with no ataxia; heel and toe walk normal  Romberg test: negative Tandem gait: Normal   Involuntary movements: Negative  Cervical examination: Full range of motion with no pain Cervical tenderness:negative  Lumbar examination: Low back tenderness-negative Sciatic notch tenderness-negative Straight leg raising test-negative     Impression:Migraine headaches; cervical syndrome; sinus headaches; left l ateral epicondylitis; left cervical radiculopathy  Recommendations/plan: Follow-up with orthopedics for left arm pain; cervical radiculopathy symptoms persist she may need EMG nerve conductions.  She is scheduled to see pain management in July for arm and neck pain.    Tempur-Pedic pillow; follow-up orthopedics for left arm pain; Followup in 6 months

## 2017-06-20 ENCOUNTER — OFFICE VISIT (OUTPATIENT)
Dept: FAMILY MEDICINE | Facility: CLINIC | Age: 67
End: 2017-06-20
Payer: MEDICARE

## 2017-06-20 VITALS
BODY MASS INDEX: 24.47 KG/M2 | DIASTOLIC BLOOD PRESSURE: 70 MMHG | OXYGEN SATURATION: 99 % | SYSTOLIC BLOOD PRESSURE: 132 MMHG | TEMPERATURE: 99 F | HEIGHT: 61 IN | WEIGHT: 129.63 LBS | HEART RATE: 70 BPM

## 2017-06-20 DIAGNOSIS — M89.9 DISORDER OF BONE AND CARTILAGE: ICD-10-CM

## 2017-06-20 DIAGNOSIS — E55.9 VITAMIN D DEFICIENCY DISEASE: ICD-10-CM

## 2017-06-20 DIAGNOSIS — M77.02 MEDIAL EPICONDYLITIS, LEFT: ICD-10-CM

## 2017-06-20 DIAGNOSIS — I10 ESSENTIAL HYPERTENSION: ICD-10-CM

## 2017-06-20 DIAGNOSIS — M85.80 OSTEOPENIA, UNSPECIFIED LOCATION: ICD-10-CM

## 2017-06-20 DIAGNOSIS — E03.9 HYPOTHYROIDISM, UNSPECIFIED TYPE: ICD-10-CM

## 2017-06-20 DIAGNOSIS — M94.9 DISORDER OF BONE AND CARTILAGE: ICD-10-CM

## 2017-06-20 DIAGNOSIS — K59.09 CHRONIC CONSTIPATION: ICD-10-CM

## 2017-06-20 DIAGNOSIS — Z12.31 ENCOUNTER FOR SCREENING MAMMOGRAM FOR BREAST CANCER: ICD-10-CM

## 2017-06-20 DIAGNOSIS — Z00.00 ROUTINE MEDICAL EXAM: Primary | ICD-10-CM

## 2017-06-20 PROCEDURE — 99999 PR PBB SHADOW E&M-EST. PATIENT-LVL III: CPT | Mod: PBBFAC,,, | Performed by: INTERNAL MEDICINE

## 2017-06-20 PROCEDURE — 1159F MED LIST DOCD IN RCRD: CPT | Mod: S$GLB,,, | Performed by: INTERNAL MEDICINE

## 2017-06-20 PROCEDURE — 1126F AMNT PAIN NOTED NONE PRSNT: CPT | Mod: S$GLB,,, | Performed by: INTERNAL MEDICINE

## 2017-06-20 PROCEDURE — 99215 OFFICE O/P EST HI 40 MIN: CPT | Mod: 25,S$GLB,, | Performed by: INTERNAL MEDICINE

## 2017-06-20 PROCEDURE — 99397 PER PM REEVAL EST PAT 65+ YR: CPT | Mod: S$GLB,,, | Performed by: INTERNAL MEDICINE

## 2017-06-20 PROCEDURE — 99499 UNLISTED E&M SERVICE: CPT | Mod: S$GLB,,, | Performed by: INTERNAL MEDICINE

## 2017-06-20 NOTE — PROGRESS NOTES
Chief complaint, physical exam, patient did arrive over 20 minutes after her stated appointment time and was still seen    67-year-old white female who admittedly does not like going to the doctor.  She is here for her physical.  Regarding health maintenance it's been about 2 years since she had lab work.  She is overdue for mammogram.  She is up-to-date on her colonoscopy.  Due to general orthopedic issues she has been exercising less and think she is getting deconditioned.        ROS:   CONST: weight stable. EYES: no vision change. ENT: no sore throat. CV: no chest pain w/ exertion. RESP:No orthopnea PND or cough. GI: no nausea, vomiting, diarrhea. No dysphagia. : no urinary issues. MUSCULOSKELETAL: no new myalgias or arthralgias except for some issues with the left shoulder blade and left medial elbow SKIN: no new changes. NEURO: no focal deficits. PSYCH: no new issues. ENDOCRINE: no polyuria. HEME: no lymph nodes. ALLERGY: no general pruritis.     New to Dr MARIN    PAST MEDICAL HISTORY:    1. Endometriosis,   2. Hypertension -was followed by Nephrology in the past, Onset age 24          3. Osteoarthritis,Hands back and neck  4. Migraine.  5. IBS with constipation- was seen by [facility]  6. H/o palpitations- neg w/u by Cards  7. Anxiety with h/o panic attacks  8. GERD - Esophagitis/gastritis by EGD , hiatal hernia on EGD     9. HYPOthyroidism -saw Endo in the past.  10. Osteoporosis by BMD 2006 by Dr Garcia. Osteopenia , intolerant to Fosamax orally in the past  11. Colonoscopy with one polyp 2014, 5 years  12.  Left medial epicondylitis                                                                                                                         PAST SURGICAL HISTORY:  Hysterectomy, a , surgeries for             endometriosis, and tonsillectomy and appendectomy.                                                                                                        SOCIAL HISTORY:  No  tobacco use.  Drinks 2-3 glasses of wine every day or    every other day.      Vital signs as above  Gen: no distress  EYES: conjunctiva clear, non-icteric, PERRL  ENT: nose clear, nasal mucosa normal, oropharynx clear and moist, teeth good  NECK:supple, thyroid non-palpable  RESP: effort is good, lungs clear  CV: heart RRR w/o murmur, gallops or rubs; no carotid bruits, no edema  GI: abdomen soft, non-distended, non-tender, no hepatosplenomegaly  MS: gait normal, no clubbing or cyanosis of the digits, patient has reproducible pain at the left medial epicondyle related to the muscles.  Worse with hand  which is 5 out of 5.  No reproducible shoulder scapula pain today.  The elbow itself is nontender with full range of motion without pain.  SKIN: no rashes, warm to touch    Aleksandra was seen today for annual exam.    Diagnoses and all orders for this visit:    Routine medical exam, we'll update labs which are overdue as well as mammogram.  Encouraged her to exercise is much as her orthopedic issues allowed.  Avoid deconditioning.    Encounter for screening mammogram for breast cancer  -     Mammo Digital Screening Bilat with CAD; Future                                        Additional evaluation and management issues:    Additionally, patient has numerous other medical issues and complaints to address today separate from her physical.  We reviewed her bone density from 2 years ago and explained the pathophysiology of osteopenia and how fractures can lead to loss of independence and function.  We explained all the available treatments.  She didn't probably take Fosamax in the past and after the dosing cause significant nausea and vomiting.  She likely will need a referral to endocrinology to discuss parenteral treatment if her osteopenia meets criteria for treatment nor is worse.    Regarding hypothyroidism she is due for annual assessment on her supplement.  She has hypertension which appears to be under good  control and we will check electrolytes and renal function.  She has vitamin D deficiency which needs reassessment in light of her osteopenia.  She has chronic constipation which she appears to have under control at this time.    Another recent issue is seeing the orthopedic for some left shoulder pain which appears to be over the scapula.  It appears perhaps an orthopedic sent her to the spine clinic thinking it might be from her neck.  An orthopedic at the [facility] clinic did a cervical spine x-ray which did show some disc disease.  She really denies any classic symptoms for cervical radiculopathy.  She had some pain at the left elbow and it was thought perhaps this was all radicular.  She clearly has a left medial epicondylitis which I explained the pathophysiology using visual aids today.  I discussed and demonstrated how a tennis elbow brace can assist and it may take many many months for this to heal.  All these various issues reviewed and patient counseled and her evaluation and management today will be based upon time counseling.  Total time over 45 minutes with over 50% counseling.          Assessment and plan:            Osteopenia, unspecified location, discussed and explained treatment options as above, she is likely intolerant to bisphosphonates orally  -     DXA Bone Density Spine And Hip; Future    Hypothyroidism, unspecified type, reassess  -     TSH; Future    Essential hypertension, reassess, chronic and stable  -     Comprehensive metabolic panel; Future  -     CBC auto differential; Future  -     Lipid panel; Future    Vitamin D deficiency disease, reassess  -     Vitamin D; Future    Chronic constipation, chronic and stable    Disorder of bone and cartilage  -     DXA Bone Density Spine And Hip; Future    Medial epicondylitis, left, discussed at great length today and recommend bracing.  Discussed other orthopedic issues as above.  Her current symptomatology does not appear to be cervical  "radiculopathy in nature and she likely should pursue orthopedic follow-up for the shoulder issue and she may well want to have the medial epicondylitis addressed specifically as well if it persists she may benefit from a cortisone injection.     Based on patient's anxiety expressed referable to all the above issues as well as to avoid confusion regarding evaluation and management coverage issues, access would not be therapeutic"This note will not be shared with the patient."  "

## 2017-06-22 ENCOUNTER — HOSPITAL ENCOUNTER (OUTPATIENT)
Dept: RADIOLOGY | Facility: CLINIC | Age: 67
Discharge: HOME | End: 2017-06-22
Attending: INTERNAL MEDICINE
Payer: MEDICARE

## 2017-06-22 ENCOUNTER — HOSPITAL ENCOUNTER (OUTPATIENT)
Dept: RADIOLOGY | Facility: HOSPITAL | Age: 67
Discharge: HOME | End: 2017-06-22
Attending: INTERNAL MEDICINE
Payer: MEDICARE

## 2017-06-22 DIAGNOSIS — Z12.31 ENCOUNTER FOR SCREENING MAMMOGRAM FOR BREAST CANCER: ICD-10-CM

## 2017-06-22 DIAGNOSIS — M89.9 DISORDER OF BONE AND CARTILAGE: ICD-10-CM

## 2017-06-22 DIAGNOSIS — M85.80 OSTEOPENIA, UNSPECIFIED LOCATION: ICD-10-CM

## 2017-06-22 DIAGNOSIS — M94.9 DISORDER OF BONE AND CARTILAGE: ICD-10-CM

## 2017-06-22 PROCEDURE — 77063 BREAST TOMOSYNTHESIS BI: CPT | Mod: 26,,, | Performed by: RADIOLOGY

## 2017-06-22 PROCEDURE — 77080 DXA BONE DENSITY AXIAL: CPT | Mod: TC,PO

## 2017-06-22 PROCEDURE — 77067 SCR MAMMO BI INCL CAD: CPT | Mod: 26,,, | Performed by: RADIOLOGY

## 2017-06-22 PROCEDURE — 77080 DXA BONE DENSITY AXIAL: CPT | Mod: 26,,, | Performed by: INTERNAL MEDICINE

## 2017-07-12 ENCOUNTER — TELEPHONE (OUTPATIENT)
Dept: ORTHOPEDICS | Facility: CLINIC | Age: 67
End: 2017-07-12

## 2017-07-12 DIAGNOSIS — M54.2 CERVICAL SPINE PAIN: Primary | ICD-10-CM

## 2017-07-28 RX ORDER — PROMETHAZINE HYDROCHLORIDE 12.5 MG/1
TABLET ORAL
Qty: 15 TABLET | Refills: 2 | Status: SHIPPED | OUTPATIENT
Start: 2017-07-28

## 2017-07-30 ENCOUNTER — NURSE TRIAGE (OUTPATIENT)
Dept: ADMINISTRATIVE | Facility: CLINIC | Age: 67
End: 2017-07-30

## 2017-07-30 NOTE — TELEPHONE ENCOUNTER
Reason for Disposition   Minor cut or scratch  (all triage questions negative)    Protocols used: ST SKIN INJURY-A-AH

## 2017-08-02 ENCOUNTER — OFFICE VISIT (OUTPATIENT)
Dept: OPTOMETRY | Facility: CLINIC | Age: 67
End: 2017-08-02
Payer: MEDICARE

## 2017-08-02 DIAGNOSIS — Z77.098 CHEMICAL EXPOSURE OF EYE: Primary | ICD-10-CM

## 2017-08-02 PROCEDURE — 99999 PR PBB SHADOW E&M-EST. PATIENT-LVL II: CPT | Mod: PBBFAC,,, | Performed by: OPTOMETRIST

## 2017-08-02 PROCEDURE — 92012 INTRM OPH EXAM EST PATIENT: CPT | Mod: S$GLB,,, | Performed by: OPTOMETRIST

## 2017-08-02 NOTE — PROGRESS NOTES
Subjective:       Patient ID: Aleksandra Santana is a 67 y.o. female      Chief Complaint   Patient presents with    Eye Problem     History of Present Illness  Dls: 1/13/16 Dr. Hodges     Pt states this am was spraying plants with plant food and bucket broke and   splashed in face and on eye lashes ou. Pt washed out ou with saline eye   wash. Pt c/o slight itching os no tearing no mucous no pain no fbs.         Assessment/Plan:     1. Chemical exposure of eye  Pt splashed plant food spray in face and eyelashes, pt unsure if any got into her eye. Pt flushed eyes out with saline. Pt reports no burning/tearing/pain. No staining with NaFl, no abrasion, no chemosis, no injection. Recommend AT PRN. RTC as needed.     Return if symptoms worsen or fail to improve.

## 2017-08-05 ENCOUNTER — PATIENT MESSAGE (OUTPATIENT)
Dept: FAMILY MEDICINE | Facility: CLINIC | Age: 67
End: 2017-08-05

## 2017-08-05 DIAGNOSIS — M81.0 OSTEOPOROSIS, POSTMENOPAUSAL: Primary | ICD-10-CM

## 2017-08-05 DIAGNOSIS — D64.9 ANEMIA, UNSPECIFIED TYPE: ICD-10-CM

## 2017-08-08 NOTE — TELEPHONE ENCOUNTER
Please see if fit kit available or prepare stool cards and the proper orders to go w them      Contact pt      Dx: anemia

## 2017-09-01 RX ORDER — ESTROGENS, CONJUGATED 1.25 MG
TABLET ORAL
Qty: 30 TABLET | Refills: 1 | Status: SHIPPED | OUTPATIENT
Start: 2017-09-01 | End: 2018-02-02 | Stop reason: SDUPTHER

## 2017-09-06 ENCOUNTER — LAB VISIT (OUTPATIENT)
Dept: LAB | Facility: HOSPITAL | Age: 67
End: 2017-09-06
Attending: INTERNAL MEDICINE
Payer: MEDICARE

## 2017-09-06 DIAGNOSIS — D64.9 ANEMIA, UNSPECIFIED TYPE: ICD-10-CM

## 2017-09-06 DIAGNOSIS — D64.9 ANEMIA, UNSPECIFIED TYPE: Primary | ICD-10-CM

## 2017-09-06 PROCEDURE — 82272 OCCULT BLD FECES 1-3 TESTS: CPT | Mod: 91

## 2017-09-08 ENCOUNTER — PATIENT MESSAGE (OUTPATIENT)
Dept: FAMILY MEDICINE | Facility: CLINIC | Age: 67
End: 2017-09-08

## 2017-09-08 ENCOUNTER — TELEPHONE (OUTPATIENT)
Dept: FAMILY MEDICINE | Facility: CLINIC | Age: 67
End: 2017-09-08

## 2017-09-08 LAB
OB PNL STL: NEGATIVE

## 2017-09-08 NOTE — TELEPHONE ENCOUNTER
Fecal occult blood was negative ×3, make sure this is put in properly and health maintenance and she gets credit for colon cancer.

## 2017-09-27 RX ORDER — CARVEDILOL 25 MG/1
TABLET ORAL
Qty: 30 TABLET | Refills: 3 | Status: SHIPPED | OUTPATIENT
Start: 2017-09-27 | End: 2018-02-14 | Stop reason: SDUPTHER

## 2017-10-05 DIAGNOSIS — K21.9 GASTROESOPHAGEAL REFLUX DISEASE WITHOUT ESOPHAGITIS: ICD-10-CM

## 2017-10-06 RX ORDER — OMEPRAZOLE 20 MG/1
20 CAPSULE, DELAYED RELEASE ORAL
Qty: 90 CAPSULE | Refills: 3 | Status: SHIPPED | OUTPATIENT
Start: 2017-10-06 | End: 2021-03-12

## 2017-11-10 RX ORDER — HYDROCHLOROTHIAZIDE 25 MG/1
TABLET ORAL
Qty: 90 TABLET | Refills: 1 | Status: SHIPPED | OUTPATIENT
Start: 2017-11-10 | End: 2018-02-14 | Stop reason: SDUPTHER

## 2017-11-10 RX ORDER — FOLIC ACID 1 MG/1
TABLET ORAL
Qty: 90 TABLET | Refills: 1 | Status: SHIPPED | OUTPATIENT
Start: 2017-11-10 | End: 2018-02-14 | Stop reason: SDUPTHER

## 2017-12-16 ENCOUNTER — NURSE TRIAGE (OUTPATIENT)
Dept: ADMINISTRATIVE | Facility: CLINIC | Age: 67
End: 2017-12-16

## 2018-01-04 ENCOUNTER — OFFICE VISIT (OUTPATIENT)
Dept: FAMILY MEDICINE | Facility: CLINIC | Age: 68
End: 2018-01-04
Payer: MEDICARE

## 2018-01-04 VITALS
HEART RATE: 61 BPM | OXYGEN SATURATION: 95 % | WEIGHT: 133.19 LBS | DIASTOLIC BLOOD PRESSURE: 70 MMHG | HEIGHT: 61 IN | BODY MASS INDEX: 25.14 KG/M2 | TEMPERATURE: 98 F | SYSTOLIC BLOOD PRESSURE: 125 MMHG

## 2018-01-04 DIAGNOSIS — J32.9 SINUSITIS, UNSPECIFIED CHRONICITY, UNSPECIFIED LOCATION: Primary | ICD-10-CM

## 2018-01-04 PROCEDURE — 99999 PR PBB SHADOW E&M-EST. PATIENT-LVL IV: CPT | Mod: PBBFAC,,, | Performed by: NURSE PRACTITIONER

## 2018-01-04 PROCEDURE — 99214 OFFICE O/P EST MOD 30 MIN: CPT | Mod: S$GLB,,, | Performed by: NURSE PRACTITIONER

## 2018-01-04 RX ORDER — AZELASTINE 1 MG/ML
SPRAY, METERED NASAL
Qty: 30 ML | Refills: 5 | Status: SHIPPED | OUTPATIENT
Start: 2018-01-04 | End: 2019-03-03

## 2018-01-04 RX ORDER — FLUTICASONE PROPIONATE 50 MCG
2 SPRAY, SUSPENSION (ML) NASAL DAILY
Qty: 1 BOTTLE | Refills: 5 | Status: SHIPPED | OUTPATIENT
Start: 2018-01-04 | End: 2019-03-03

## 2018-01-04 RX ORDER — OLOPATADINE HYDROCHLORIDE 1 MG/ML
1 SOLUTION/ DROPS OPHTHALMIC 2 TIMES DAILY
Qty: 5 ML | Refills: 5 | Status: SHIPPED | OUTPATIENT
Start: 2018-01-04

## 2018-01-04 RX ORDER — PREDNISONE 10 MG/1
10 TABLET ORAL DAILY
Qty: 5 TABLET | Refills: 0 | Status: SHIPPED | OUTPATIENT
Start: 2018-01-04 | End: 2018-01-09

## 2018-01-04 RX ORDER — CETIRIZINE HYDROCHLORIDE 10 MG/1
10 TABLET ORAL DAILY
Qty: 30 TABLET | Refills: 2 | Status: SHIPPED | OUTPATIENT
Start: 2018-01-04 | End: 2018-02-20

## 2018-01-05 NOTE — PROGRESS NOTES
Patient Name: Aleksandra Santana    : 1950  MRN: [id number]    Subjective:  Aleksandra is a 67 y.o. female who presents today for     1. Sore throat x 4 days, 1 day nasal congestion, watery eyes, fatigue and ringing in ears.     Past Medical History  Past Medical History:   Diagnosis Date    Abnormal EKG     Anxiety     Cataract     GERD (gastroesophageal reflux disease)     History of colonic polyps     1 polyp  --5 yrs.    Hypertension     Hypothyroidism     IBS (irritable bowel syndrome)     Migraine syndrome     OA (osteoarthritis)     Osteopenia     BMD  --no tx -repeat 2 yrs    Osteoporosis, postmenopausal     Palpitations     SOB (shortness of breath)        Past Surgical History  Past Surgical History:   Procedure Laterality Date    APPENDECTOMY       SECTION      x2    HYSTERECTOMY  age 33    COLEEN/BSO for endometriosis    TONSILLECTOMY         Family History  Family History   Problem Relation Age of Onset    Cataracts Mother     No Known Problems Father     Ovarian cancer Sister     No Known Problems Brother     No Known Problems Maternal Aunt     No Known Problems Maternal Uncle     No Known Problems Paternal Aunt     No Known Problems Paternal Uncle     No Known Problems Maternal Grandmother     No Known Problems Maternal Grandfather     No Known Problems Paternal Grandmother     No Known Problems Paternal Grandfather     Colon cancer Neg Hx     Breast cancer Neg Hx     Amblyopia Neg Hx     Blindness Neg Hx     Cancer Neg Hx     Diabetes Neg Hx     Glaucoma Neg Hx     Hypertension Neg Hx     Macular degeneration Neg Hx     Retinal detachment Neg Hx     Strabismus Neg Hx     Stroke Neg Hx     Thyroid disease Neg Hx        Social History  Social History     Social History    Marital status:      Spouse name: N/A    Number of children: N/A    Years of education: N/A     Occupational History    Not on file.     Social History Main Topics     Smoking status: Never Smoker    Smokeless tobacco: Never Used    Alcohol use 0.6 oz/week     1 Glasses of wine per week      Comment: ocasionally    Drug use: No    Sexual activity: Not Currently     Partners: Male     Birth control/ protection: None, Post-menopausal     Other Topics Concern    Not on file     Social History Narrative    No narrative on file       Allergies  Review of patient's allergies indicates:   Allergen Reactions    Codeine     Iodine and iodide containing products     Iodinated contrast- oral and iv dye      Rash (skin)^  Rash (skin)^    Doxycycline Rash    -reviewed and updated      Medications  Reviewed and updated.   Current Outpatient Prescriptions   Medication Sig Dispense Refill    amitriptyline (ELAVIL) 10 MG tablet 10 mg nightly x 7 days.  Add 10 mg every week until symptoms better.  Max 50 mg nightly. 60 tablet 11    amlodipine-olmesartan (DAYNA) 5-40 mg per tablet Take 1 tablet by mouth once daily. 90 tablet 0    azelastine (ASTELIN) 137 mcg (0.1 %) nasal spray USE 1-2 SPRAYS IN EACH NOSTRIL TWICE DAILY AS NEEDED RHINITIS 30 mL 5    butalbital-acetaminophen-caffeine -40 mg (FIORICET, ESGIC) -40 mg per tablet Take 1 tablet by mouth every 6 (six) hours as needed for Pain. 30 tablet 3    calcium-vitamin D tablet 600 mg-200 units Take 2 tablets by mouth once.      carvedilol (COREG) 25 MG tablet TAKE ONE TABLET BY MOUTH EVERY DAY 30 tablet 3    celecoxib (CELEBREX) 200 MG capsule Take 1 capsule (200 mg total) by mouth 2 (two) times daily. 60 capsule 1    fluticasone (FLONASE) 50 mcg/actuation nasal spray 2 sprays (100 mcg total) by Each Nare route once daily. 1 Bottle 5    folic acid (FOLVITE) 1 MG tablet TAKE ONE TABLET BY MOUTH EVERY DAY 90 tablet 1    hydroCHLOROthiazide (HYDRODIURIL) 25 MG tablet TAKE ONE TABLET BY MOUTH EVERY DAY 90 tablet 1    ibandronate (BONIVA) 150 mg tablet Take 1 tablet (150 mg total) by mouth every 30 days. 1 tablet 11     "meclizine (ANTIVERT) 25 mg tablet Take 1 tablet (25 mg total) by mouth 3 (three) times daily as needed for Dizziness or Nausea. 30 tablet 0    omeprazole (PRILOSEC) 20 MG capsule Take 1 capsule (20 mg total) by mouth before breakfast. 90 capsule 3    PREMARIN 1.25 mg tablet TAKE ONE Tablet BY MOUTH EVERY DAY 30 tablet 1    promethazine (PHENERGAN) 12.5 MG Tab TAKE ONE Tablet BY MOUTH EVERY 6 HOURS AS NEEDED 15 tablet 2    sumatriptan (IMITREX) 50 MG tablet Take one tablet at onset of severe migraine.  May repeat in 2 hours but not to exceed 2 tablets in 24 hours 10 tablet 2    SYNTHROID 75 mcg tablet TAKE ONE TABLET BY MOUTH EVERY DAY 90 tablet 1    amoxicillin (AMOXIL) 875 MG tablet Take 1 tablet (875 mg total) by mouth 2 (two) times daily. 20 tablet 0    benzonatate (TESSALON) 200 MG capsule Take 1 capsule (200 mg total) by mouth every 8 (eight) hours as needed. 30 capsule 0    cetirizine (ZYRTEC) 10 MG tablet Take 1 tablet (10 mg total) by mouth once daily. 30 tablet 2    olopatadine (PATANOL) 0.1 % ophthalmic solution Place 1 drop into both eyes 2 (two) times daily. 5 mL 5     No current facility-administered medications for this visit.          Review of Systems   Constitutional: Positive for chills and malaise/fatigue (watery). Negative for fever.   HENT: Positive for congestion (x 1 day), sore throat (whitex 4 days, in am and night) and tinnitus. Negative for ear pain and hearing loss.    Eyes: Positive for discharge.   Respiratory: Negative for cough.    Cardiovascular: Negative for chest pain.   Musculoskeletal: Negative for myalgias.         Physical Exam  /70 (BP Location: Right arm, Patient Position: Sitting)   Pulse 61   Temp 98.1 °F (36.7 °C) (Oral)   Ht 5' 1" (1.549 m)   Wt 60.4 kg (133 lb 2.5 oz)   SpO2 95%   BMI 25.16 kg/m²   Physical Exam   Constitutional: She appears well-developed.   HENT:   Head: Normocephalic.   Right Ear: A middle ear effusion is present.   Left Ear: A " middle ear effusion is present.   Nose: Mucosal edema present.   Mouth/Throat: Posterior oropharyngeal erythema present. No oropharyngeal exudate or posterior oropharyngeal edema.   Cardiovascular: Normal rate, regular rhythm and normal heart sounds.    Pulmonary/Chest: Effort normal and breath sounds normal.   Skin: She is not diaphoretic.         Assessment/Plan:  Aleksandra Santana is a 67 y.o. female who presents today for :    Sinusitis, unspecified chronicity, unspecified location  Advise hydration, rest, tylenol or ibuprofen prn fever/pain, symptom management,good handwashing  -     fluticasone (FLONASE) 50 mcg/actuation nasal spray; 2 sprays (100 mcg total) by Each Nare route once daily.  Dispense: 1 Bottle; Refill: 5  -     azelastine (ASTELIN) 137 mcg (0.1 %) nasal spray; USE 1-2 SPRAYS IN EACH NOSTRIL TWICE DAILY AS NEEDED RHINITIS  Dispense: 30 mL; Refill: 5  -     cetirizine (ZYRTEC) 10 MG tablet; Take 1 tablet (10 mg total) by mouth once daily.  Dispense: 30 tablet; Refill: 2  -     olopatadine (PATANOL) 0.1 % ophthalmic solution; Place 1 drop into both eyes 2 (two) times daily.  Dispense: 5 mL; Refill: 5  -     predniSONE (DELTASONE) 10 MG tablet; Take 1 tablet (10 mg total) by mouth once daily.  Dispense: 5 tablet; Refill: 0        Return if symptoms worsen or fail to improve in 5-7 days .

## 2018-01-05 NOTE — PATIENT INSTRUCTIONS

## 2018-01-08 ENCOUNTER — TELEPHONE (OUTPATIENT)
Dept: FAMILY MEDICINE | Facility: CLINIC | Age: 68
End: 2018-01-08

## 2018-01-08 RX ORDER — AMOXICILLIN 875 MG/1
875 TABLET, FILM COATED ORAL 2 TIMES DAILY
Qty: 20 TABLET | Refills: 0 | Status: SHIPPED | OUTPATIENT
Start: 2018-01-08 | End: 2018-01-22

## 2018-01-08 RX ORDER — BENZONATATE 200 MG/1
200 CAPSULE ORAL EVERY 8 HOURS PRN
Qty: 30 CAPSULE | Refills: 0 | Status: SHIPPED | OUTPATIENT
Start: 2018-01-08 | End: 2018-01-18

## 2018-01-08 NOTE — TELEPHONE ENCOUNTER
----- Message from Milena Prado sent at 1/7/2018 12:34 PM CST -----  Contact: self  Pt called to put in prescription for antibiotic and cough medicine because stated she still feels bad. Pt wanted to know if she can get Benzonatate 200gm filled because it worked for her last time she was sick.       thanks

## 2018-01-22 ENCOUNTER — OFFICE VISIT (OUTPATIENT)
Dept: FAMILY MEDICINE | Facility: CLINIC | Age: 68
End: 2018-01-22
Payer: MEDICARE

## 2018-01-22 VITALS
WEIGHT: 133.19 LBS | OXYGEN SATURATION: 97 % | DIASTOLIC BLOOD PRESSURE: 65 MMHG | HEART RATE: 82 BPM | BODY MASS INDEX: 25.14 KG/M2 | SYSTOLIC BLOOD PRESSURE: 125 MMHG | HEIGHT: 61 IN | TEMPERATURE: 98 F

## 2018-01-22 DIAGNOSIS — J01.90 ACUTE SINUSITIS WITH SYMPTOMS GREATER THAN 10 DAYS: Primary | ICD-10-CM

## 2018-01-22 PROCEDURE — 99214 OFFICE O/P EST MOD 30 MIN: CPT | Mod: S$GLB,,, | Performed by: NURSE PRACTITIONER

## 2018-01-22 PROCEDURE — 99999 PR PBB SHADOW E&M-EST. PATIENT-LVL V: CPT | Mod: PBBFAC,,, | Performed by: NURSE PRACTITIONER

## 2018-01-22 RX ORDER — CEPHALEXIN 500 MG/1
500 CAPSULE ORAL EVERY 8 HOURS
Qty: 30 CAPSULE | Refills: 0 | Status: SHIPPED | OUTPATIENT
Start: 2018-01-22 | End: 2018-02-01

## 2018-01-22 RX ORDER — PREDNISONE 10 MG/1
10 TABLET ORAL DAILY
Refills: 0 | COMMUNITY
Start: 2018-01-04 | End: 2018-02-20

## 2018-01-23 NOTE — PATIENT INSTRUCTIONS

## 2018-01-23 NOTE — PROGRESS NOTES
History of Present Illness   Aleksandra Santana is a 67 y.o. woman with medical history as listed below who presents today for sinusitis symptoms for two weeks. She has completed Amoxicillin, prednisone, Tessalon, and Sudafed with no relief. She complains of congestion, post nasal drip, cough, and sinus pressure with headaches. She denies ear pain or sore throat. She has no fevers, body aches, chills, or fatigue. She has had sick contacts. She has no additional complaints and is otherwise healthy on today's visit.    Past Medical History:   Diagnosis Date    Abnormal EKG     Anxiety     Cataract     GERD (gastroesophageal reflux disease)     History of colonic polyps     1 polyp  --5 yrs.    Hypertension     Hypothyroidism     IBS (irritable bowel syndrome)     Migraine syndrome     OA (osteoarthritis)     Osteopenia     BMD  --no tx -repeat 2 yrs    Osteoporosis, postmenopausal     Palpitations     SOB (shortness of breath)        Past Surgical History:   Procedure Laterality Date    APPENDECTOMY       SECTION      x2    HYSTERECTOMY  age 33    COLEEN/BSO for endometriosis    TONSILLECTOMY         Social History     Social History    Marital status:      Spouse name: N/A    Number of children: N/A    Years of education: N/A     Social History Main Topics    Smoking status: Never Smoker    Smokeless tobacco: Never Used    Alcohol use 0.6 oz/week     1 Glasses of wine per week      Comment: ocasionally    Drug use: No    Sexual activity: Not Currently     Partners: Male     Birth control/ protection: None, Post-menopausal     Other Topics Concern    None     Social History Narrative    None       Family History   Problem Relation Age of Onset    Cataracts Mother     No Known Problems Father     Ovarian cancer Sister     No Known Problems Brother     No Known Problems Maternal Aunt     No Known Problems Maternal Uncle     No Known Problems Paternal Aunt     No  "Known Problems Paternal Uncle     No Known Problems Maternal Grandmother     No Known Problems Maternal Grandfather     No Known Problems Paternal Grandmother     No Known Problems Paternal Grandfather     Colon cancer Neg Hx     Breast cancer Neg Hx     Amblyopia Neg Hx     Blindness Neg Hx     Cancer Neg Hx     Diabetes Neg Hx     Glaucoma Neg Hx     Hypertension Neg Hx     Macular degeneration Neg Hx     Retinal detachment Neg Hx     Strabismus Neg Hx     Stroke Neg Hx     Thyroid disease Neg Hx        Review of Systems  Review of Systems   Constitutional: Negative for chills, fever and malaise/fatigue.   HENT: Positive for congestion and sinus pain. Negative for ear discharge, ear pain and sore throat.    Eyes: Negative for discharge and redness.   Respiratory: Positive for cough. Negative for sputum production, shortness of breath and wheezing.    Cardiovascular: Negative for chest pain.   Gastrointestinal: Negative for nausea and vomiting.   Neurological: Positive for headaches.     A complete review of systems was otherwise negative.    Physical Exam  /65 (BP Location: Right arm, Patient Position: Sitting)   Pulse 82   Temp 98.2 °F (36.8 °C) (Oral)   Ht 5' 1" (1.549 m)   Wt 60.4 kg (133 lb 2.5 oz)   SpO2 97%   BMI 25.16 kg/m²   General appearance: alert, appears stated age, cooperative, no distress and ill appearing  Eyes: negative findings: lids and lashes normal and conjunctivae and sclerae normal  Ears: normal TM's and external ear canals both ears  Nose: clear discharge, moderate congestion, turbinates red, swollen, sinus tenderness bilateral  Throat: lips, mucosa, and tongue normal; teeth and gums normal and moderate oropharyngeal erythema with clear post nasal drainage  Lungs: clear to auscultation bilaterally  Heart: regular rate and rhythm, S1, S2 normal, no murmur, click, rub or gallop  Lymph nodes: Cervical, supraclavicular, and axillary nodes normal.  Neurologic: " Grossly normal    Assessment/Plan  Acute sinusitis with symptoms greater than 10 days  Use the anti-histamine and flonase daily.  Continue Tessalon.  Start keflex, take until complete.  Rest and drink plenty of fluids.  Follow-up with ENT if no improvement.  -     cephALEXin (KEFLEX) 500 MG capsule; Take 1 capsule (500 mg total) by mouth every 8 (eight) hours.  Dispense: 30 capsule; Refill: 0    She has verbalized understanding and is in agreement with plan of care.    Follow-up if symptoms worsen or fail to improve.

## 2018-02-02 RX ORDER — ESTROGENS, CONJUGATED 1.25 MG
TABLET ORAL
Qty: 30 TABLET | Refills: 3 | Status: SHIPPED | OUTPATIENT
Start: 2018-02-02 | End: 2019-03-14 | Stop reason: SDUPTHER

## 2018-02-02 RX ORDER — AMLODIPINE BESYLATE AND OLMESARTAN MEDOXOMIL 5; 40 MG/1; MG/1
TABLET, FILM COATED ORAL
Qty: 90 TABLET | Refills: 1 | Status: SHIPPED | OUTPATIENT
Start: 2018-02-02 | End: 2018-10-09 | Stop reason: SDUPTHER

## 2018-02-09 RX ORDER — BUTALBITAL, ACETAMINOPHEN AND CAFFEINE 50; 325; 40 MG/1; MG/1; MG/1
TABLET ORAL
Qty: 30 TABLET | Refills: 1 | Status: SHIPPED | OUTPATIENT
Start: 2018-02-09 | End: 2018-12-12 | Stop reason: SDUPTHER

## 2018-02-09 NOTE — TELEPHONE ENCOUNTER
----- Message from Cirilo Araiza sent at 2/9/2018 12:08 PM CST -----  Contact: Self @ [phone]  Pt is asking for refill on butalbital-acetaminophen-caffeine -40 mg (FIORICET, ESGIC) -40 mg per tablet. Pt states pharmacy will fax over request.      [facility] [address] [address]  Phone: [phone] Fax: [phone]

## 2018-02-12 DIAGNOSIS — J01.90 ACUTE SINUSITIS WITH SYMPTOMS GREATER THAN 10 DAYS: ICD-10-CM

## 2018-02-14 RX ORDER — HYDROCHLOROTHIAZIDE 25 MG/1
TABLET ORAL
Qty: 90 TABLET | Refills: 1 | Status: SHIPPED | OUTPATIENT
Start: 2018-02-14 | End: 2018-09-04 | Stop reason: SDUPTHER

## 2018-02-14 RX ORDER — FOLIC ACID 1 MG/1
TABLET ORAL
Qty: 90 TABLET | Refills: 1 | Status: SHIPPED | OUTPATIENT
Start: 2018-02-14 | End: 2018-12-22 | Stop reason: SDUPTHER

## 2018-02-14 RX ORDER — CARVEDILOL 25 MG/1
TABLET ORAL
Qty: 30 TABLET | Refills: 1 | Status: SHIPPED | OUTPATIENT
Start: 2018-02-14 | End: 2018-03-19 | Stop reason: SDUPTHER

## 2018-02-14 RX ORDER — CEPHALEXIN 500 MG/1
500 CAPSULE ORAL EVERY 8 HOURS
Qty: 30 CAPSULE | Refills: 0 | OUTPATIENT
Start: 2018-02-14 | End: 2018-02-24

## 2018-02-20 ENCOUNTER — OFFICE VISIT (OUTPATIENT)
Dept: FAMILY MEDICINE | Facility: CLINIC | Age: 68
End: 2018-02-20
Payer: MEDICARE

## 2018-02-20 VITALS
HEIGHT: 61 IN | SYSTOLIC BLOOD PRESSURE: 138 MMHG | WEIGHT: 133.38 LBS | HEART RATE: 75 BPM | OXYGEN SATURATION: 99 % | TEMPERATURE: 98 F | BODY MASS INDEX: 25.18 KG/M2 | DIASTOLIC BLOOD PRESSURE: 72 MMHG

## 2018-02-20 DIAGNOSIS — B96.89 ACUTE BACTERIAL SINUSITIS: Primary | ICD-10-CM

## 2018-02-20 DIAGNOSIS — J01.90 ACUTE BACTERIAL SINUSITIS: Primary | ICD-10-CM

## 2018-02-20 PROCEDURE — 1125F AMNT PAIN NOTED PAIN PRSNT: CPT | Mod: S$GLB,,, | Performed by: NURSE PRACTITIONER

## 2018-02-20 PROCEDURE — 99214 OFFICE O/P EST MOD 30 MIN: CPT | Mod: S$GLB,,, | Performed by: NURSE PRACTITIONER

## 2018-02-20 PROCEDURE — 1159F MED LIST DOCD IN RCRD: CPT | Mod: S$GLB,,, | Performed by: NURSE PRACTITIONER

## 2018-02-20 PROCEDURE — 99999 PR PBB SHADOW E&M-EST. PATIENT-LVL V: CPT | Mod: PBBFAC,,, | Performed by: NURSE PRACTITIONER

## 2018-02-20 PROCEDURE — 3008F BODY MASS INDEX DOCD: CPT | Mod: S$GLB,,, | Performed by: NURSE PRACTITIONER

## 2018-02-20 RX ORDER — PREDNISONE 10 MG/1
10 TABLET ORAL DAILY
Qty: 5 TABLET | Refills: 0 | Status: SHIPPED | OUTPATIENT
Start: 2018-02-20 | End: 2018-02-25

## 2018-02-20 RX ORDER — CEPHALEXIN 500 MG/1
500 CAPSULE ORAL EVERY 8 HOURS
Qty: 30 CAPSULE | Refills: 0 | Status: SHIPPED | OUTPATIENT
Start: 2018-02-20 | End: 2018-03-02

## 2018-02-20 RX ORDER — LEVOCETIRIZINE DIHYDROCHLORIDE 5 MG/1
TABLET, FILM COATED ORAL
COMMUNITY
Start: 2018-02-02 | End: 2019-09-13 | Stop reason: SDUPTHER

## 2018-02-20 NOTE — PATIENT INSTRUCTIONS
Sinusitis (Antibiotic Treatment)    The sinuses are air-filled spaces within the bones of the face. They connect to the inside of the nose. Sinusitis is an inflammation of the tissue lining the sinus cavity. Sinus inflammation can occur during a cold. It can also be due to allergies to pollens and other particles in the air. Sinusitis can cause symptoms of sinus congestion and fullness. A sinus infection causes fever, headache and facial pain. There is often green or yellow drainage from the nose or into the back of the throat (post-nasal drip). You have been given antibiotics to treat this condition.  Home care:  · Take the full course of antibiotics as instructed. Do not stop taking them, even if you feel better.  · Drink plenty of water, hot tea, and other liquids. This may help thin mucus. It also may promote sinus drainage.  · Heat may help soothe painful areas of the face. Use a towel soaked in hot water. Or,  the shower and direct the hot spray onto your face. Using a vaporizer along with a menthol rub at night may also help.   · An expectorant containing guaifenesin may help thin the mucus and promote drainage from the sinuses.  · Over-the-counter decongestants may be used unless a similar medicine was prescribed. Nasal sprays work the fastest. Use one that contains phenylephrine or oxymetazoline. First blow the nose gently. Then use the spray. Do not use these medicines more often than directed on the label or symptoms may get worse. You may also use tablets containing pseudoephedrine. Avoid products that combine ingredients, because side effects may be increased. Read labels. You can also ask the pharmacist for help. (NOTE: Persons with high blood pressure should not use decongestants. They can raise blood pressure.)  · Over-the-counter antihistamines may help if allergies contributed to your sinusitis.    · Do not use nasal rinses or irrigation during an acute sinus infection, unless told to by  your health care provider. Rinsing may spread the infection to other sinuses.  · Use acetaminophen or ibuprofen to control pain, unless another pain medicine was prescribed. (If you have chronic liver or kidney disease or ever had a stomach ulcer, talk with your doctor before using these medicines. Aspirin should never be used in anyone under 18 years of age who is ill with a fever. It may cause severe liver damage.)  · Don't smoke. This can worsen symptoms.  Follow-up care  Follow up with your healthcare provider or our staff if you are not improving within the next week.  When to seek medical advice  Call your healthcare provider if any of these occur:  · Facial pain or headache becoming more severe  · Stiff neck  · Unusual drowsiness or confusion  · Swelling of the forehead or eyelids  · Vision problems, including blurred or double vision  · Fever of 100.4ºF (38ºC) or higher, or as directed by your healthcare provider  · Seizure  · Breathing problems  · Symptoms not resolving within 10 days  Date Last Reviewed: 4/13/2015  © 8209-1719 [employer]. [address] [place] [address], [place] [address]. All rights reserved. This information is not intended as a substitute for professional medical care. Always follow your healthcare professional's instructions.

## 2018-02-20 NOTE — PROGRESS NOTES
Subjective:       Patient ID: Aleksandra Santana is a 67 y.o. female.    Chief Complaint: Sinus Problem    URI    This is a recurrent problem. The current episode started more than 1 month ago. The problem has been waxing and waning. There has been no fever. Associated symptoms include congestion, coughing, ear pain, headaches, a plugged ear sensation, rhinorrhea, sinus pain, sneezing and a sore throat. Pertinent negatives include no abdominal pain, chest pain, diarrhea, dysuria, joint pain, joint swelling, nausea, neck pain, rash, swollen glands, vomiting or wheezing.     Review of Systems   Constitutional: Positive for fatigue. Negative for activity change, appetite change, chills and fever.   HENT: Positive for congestion, ear pain, postnasal drip, rhinorrhea, sinus pain, sinus pressure, sneezing and sore throat. Negative for voice change.    Respiratory: Positive for cough. Negative for shortness of breath and wheezing.    Cardiovascular: Negative for chest pain.   Gastrointestinal: Negative for abdominal pain, diarrhea, nausea and vomiting.   Genitourinary: Negative for dysuria.   Musculoskeletal: Negative for joint pain and neck pain.   Skin: Negative for rash.   Neurological: Positive for headaches.       Objective:      Physical Exam   Constitutional: She appears well-developed and well-nourished. No distress.   HENT:   Head: Normocephalic and atraumatic.   Right Ear: Tympanic membrane, external ear and ear canal normal.   Left Ear: Tympanic membrane, external ear and ear canal normal.   Nose: Mucosal edema and rhinorrhea present. Right sinus exhibits maxillary sinus tenderness and frontal sinus tenderness. Left sinus exhibits maxillary sinus tenderness and frontal sinus tenderness.   Mouth/Throat: Uvula is midline, oropharynx is clear and moist and mucous membranes are normal. Mucous membranes are not dry. No oropharyngeal exudate, posterior oropharyngeal edema or posterior oropharyngeal erythema.    Cardiovascular: Normal rate and regular rhythm.    No murmur heard.  Pulmonary/Chest: Effort normal and breath sounds normal. She has no wheezes. She has no rales.   Lymphadenopathy:     She has no cervical adenopathy.   Skin: Skin is warm and dry.   Nursing note and vitals reviewed.      Assessment:       1. Acute bacterial sinusitis        Plan:       Aleksandra was seen today for sinus problem.    Diagnoses and all orders for this visit:    Acute bacterial sinusitis  -     cephALEXin (KEFLEX) 500 MG capsule; Take 1 capsule (500 mg total) by mouth every 8 (eight) hours.  -     predniSONE (DELTASONE) 10 MG tablet; Take 1 tablet (10 mg total) by mouth once daily.    HOME CARE:  · Drink plenty of water, hot tea, and other liquids to stay well hydrated. This thins the mucus and promotes sinus drainage.  · Apply heat to the painful areas of the face. Use a towel soaked in hot water. Or,  the shower and direct the hot spray onto your face. This is a good way to inhale warm water vapor and get heat on your face at the same time. (Cover your mouth and nose with your hands so you can still breathe as you do this.)  · Use a vaporizer with products such as Vicks VapoRub (contains menthol) at night. Suck on peppermint, menthol or eucalyptus hard candies during the day.  · An expectorant containing guaifenesin (such as Robitussin), helps to thin the mucus and promote drainage from the sinuses.  · Over-the-counter decongestants may be used unless a similar medicine was prescribed. Nasal sprays work the fastest. Use one that contains phenylephrine (Cj-synephrine, Sinex and others) or oxymetazoline (Afrin). First blow the nose gently to remove mucus, then apply the drops. Do not use these medicines more often than directed on the label or for more than three days or symptoms may worsen. You may also use tablets containing pseudoephedrine (Sudafed). Many sinus remedies combine ingredients, which may increase side effects.  Read the labels or ask the pharmacist for help. NOTE: Persons with high blood pressure should not use decongestants. They can raise blood pressure.  · Antihistamines are useful if allergies are a cause of your sinusitis. The mildest one is chlorpheniramine (available without a prescription). The dose for adults is 8-12mg three times a day. [NOTE: Do not use chlorpheniramine if you have glaucoma or if you are a man with trouble urinating due to an enlarged prostate.] Claritin (loratidine) is an antihistamine that causes less drowsiness and is a good alternative for daytime use.  · Do not use nasal rinses or irrigation during an acute sinus infection, unless advised by your doctor. Rinsing may spread the infection to other sinuses.  · You may use acetaminophen (Tylenol) or ibuprofen (Motrin, Advil) to control pain, unless another pain medicine was prescribed. [ NOTE: If you have chronic liver or kidney disease or ever had a stomach ulcer, talk with your doctor before using these medicines.] (Aspirin should never be used in anyone under 18 years of age who is ill with a fever. It may cause severe liver damage.)  · Finish the full course, even if you are feeling better after a few days.  FOLLOW UP with your doctor or this facility in one week or as instructed by our staff if not improving.  GET PROMPT MEDICAL ATTENTION if any of the following occur:  · Facial pain or headache becomes more severe  · Stiff neck  · Unusual drowsiness or confusion, or not acting like your normal self  · Swelling of the forehead or eyelids  · Vision problems including blurred or double vision  · Fever of 100.4ºF (38ºC) or higher, or as directed by your healthcare provider  · Seizure  © 4034-4126 [employer], [address] [place] [address], [place] [address]. All rights reserved. This information is not intended as a substitute for professional medical care. Always follow your healthcare professional's instructions.

## 2018-02-26 ENCOUNTER — PES CALL (OUTPATIENT)
Dept: ADMINISTRATIVE | Facility: CLINIC | Age: 68
End: 2018-02-26

## 2018-03-14 ENCOUNTER — OFFICE VISIT (OUTPATIENT)
Dept: NEUROLOGY | Facility: CLINIC | Age: 68
End: 2018-03-14
Payer: MEDICARE

## 2018-03-14 VITALS
WEIGHT: 134.25 LBS | SYSTOLIC BLOOD PRESSURE: 164 MMHG | BODY MASS INDEX: 25.34 KG/M2 | HEIGHT: 61 IN | DIASTOLIC BLOOD PRESSURE: 79 MMHG | HEART RATE: 70 BPM

## 2018-03-14 DIAGNOSIS — G43.009 MIGRAINE WITHOUT AURA AND WITHOUT STATUS MIGRAINOSUS, NOT INTRACTABLE: Primary | ICD-10-CM

## 2018-03-14 DIAGNOSIS — G54.2 CERVICAL SYNDROME: ICD-10-CM

## 2018-03-14 PROCEDURE — 3077F SYST BP >= 140 MM HG: CPT | Mod: CPTII,S$GLB,, | Performed by: NEUROMUSCULOSKELETAL MEDICINE & OMM

## 2018-03-14 PROCEDURE — 3078F DIAST BP <80 MM HG: CPT | Mod: CPTII,S$GLB,, | Performed by: NEUROMUSCULOSKELETAL MEDICINE & OMM

## 2018-03-14 PROCEDURE — 99214 OFFICE O/P EST MOD 30 MIN: CPT | Mod: S$GLB,,, | Performed by: NEUROMUSCULOSKELETAL MEDICINE & OMM

## 2018-03-14 PROCEDURE — 99499 UNLISTED E&M SERVICE: CPT | Mod: S$GLB,,, | Performed by: NEUROMUSCULOSKELETAL MEDICINE & OMM

## 2018-03-14 PROCEDURE — 99999 PR PBB SHADOW E&M-EST. PATIENT-LVL III: CPT | Mod: PBBFAC,,, | Performed by: NEUROMUSCULOSKELETAL MEDICINE & OMM

## 2018-03-14 NOTE — PROGRESS NOTES
program. There are word recognition mistakes that are occasionally missed on review.  History: Patient presents for follow-up for her headaches.  She describes a pressure pain in the head with associated photophobia and nausea and occasional vomiting.  She complains of feeling flushed in the face yesterday and today.  She initially blamed it on the Imitrex for which she took initially 100 mg 2 days ago for a headache and recently half of a 50 mg.  Yesterday she did not have the Imitrex and still had the flushing sensation in the face.  On further questioning patient has been having a lot of neck problems and treated by orthopedics with meloxicam 15 mg for her back and neck.  Patient took meloxicam over the last 3 days which in my opinion is probably the cause of her flushing sensation in the face and queasiness.  She has had Flexeril before but could not tolerate a full dose.  She has tried Robaxin and again could not tolerate those.  Previous note: 6-19-17:  Patient presents for follow-up for her headaches and new arm pain.  She complains of left shoulder p pain radiating into the arm and hand.  She was seen by orthopedics 3 weeks ago and had an injection in the shoulder blade area.  This seemed to help some.  The left arm pain occasionally will radiate down to the 2 middle fingers with tingling.  She is scheduled for pain management in July.  She notes that moving her neck to the left increases the left sciatic pain.  She was recently changed from meloxicam to Celebrex.  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  Overall her migraines have been much better.  Previous note: 2-7-17:  Patient presents for follow-up after several years.  She complains of a bad headache last week with pressure on top of the head and a feeling of off balance to the right.  Headache was associated with nausea and vomiting with intensity of the headache up to 9/10.  She also has sinus problems which she thinks may  trigger the headaches.  Imitrex does help.  She also has neck problems on a chronic basis.  She has some Flexeril tablets which I've encouraged her to try for the neck problems.  She is scheduled to see the ENT doctors for her sinus problems.           Gen. Appearance: Well-developed with no obvious deformities  Carotid arteries symmetrical pulses  Peripheral vascular shows symmetrical pulses with no obvious edema or tenderness  Neurological Exam:  Mental status-alert and oriented to person, place, and time; attention span and concentration is good. Fund of knowledge-patient is aware of current events and able to give detailed history of the current problem.recent and remote memory seems intact. Language function is normal with no evidence of aphasia     Cranial nerves:Visual acuity to hand chart -normal; visual fields to confrontation normal;pupils were equal and reactive to light ; funduscopic examination was normal with sharp disc margins. external ocular movements were full with no nystagmus. Facial sensation to pinprick and corneal reflexes intact; Facial muscles were symmetrical. Hearing is unimpaired symmetrical finger rub; Tongue and palate movements were normal with swallowing unimpaired. Shoulder shrug was intact with good strength Speech was normal     Motor examination: Upper and Lower extremities - Normal and symmetrical;muscle tone was normal ; right-handed  Sensory examination:Upper and lower extremities - Pinprick and soft touch were normal and symmetrical. Vibration sense was normal at the toes for age 15-20 seconds  Deep tendon reflexes were 1-2+ symmetrical. Both plantar responses were flexor  Cerebellar examination upper: Normal finger to nose and rapid alternating movements  Gait: Steady with no ataxia; heel and toe walk normal  Romberg test: negative Tandem gait: Normal   Involuntary movements: Negative  Cervical examination: Full range of motion with no pain Cervical  tenderness:negative  Lumbar examination: Low back tenderness-negative Sciatic notch tenderness-negative Straight leg raising test-negative     Impression:Migraine headaches; cervical syndrome; sinus headaches; left l ateral epicondylitis; left cervical radiculopathy  Recommendations/plan: Follow-up with orthopedics ; recommended stopping meloxicam and Imitrex for several days.  She will start Flexeril 10 mg quarter tablet at night to help with the neck and headaches.  When restarting meloxicam as suggested 7.5 with food and Pepcid AC.  Follow-up in 2 months.

## 2018-03-19 RX ORDER — CARVEDILOL 25 MG/1
TABLET ORAL
Qty: 30 TABLET | Refills: 1 | Status: SHIPPED | OUTPATIENT
Start: 2018-03-19 | End: 2018-07-27 | Stop reason: SDUPTHER

## 2018-04-18 RX ORDER — CELECOXIB 200 MG/1
CAPSULE ORAL
Qty: 60 CAPSULE | Refills: 1 | Status: SHIPPED | OUTPATIENT
Start: 2018-04-18 | End: 2019-07-11 | Stop reason: SDUPTHER

## 2018-04-19 RX ORDER — LEVOTHYROXINE SODIUM 75 UG/1
TABLET ORAL
Qty: 90 TABLET | Refills: 1 | Status: SHIPPED | OUTPATIENT
Start: 2018-04-19 | End: 2018-09-04 | Stop reason: SDUPTHER

## 2018-05-11 ENCOUNTER — OFFICE VISIT (OUTPATIENT)
Dept: DERMATOLOGY | Facility: CLINIC | Age: 68
End: 2018-05-11
Payer: MEDICARE

## 2018-05-11 DIAGNOSIS — Z12.83 SKIN CANCER SCREENING: ICD-10-CM

## 2018-05-11 DIAGNOSIS — D48.5 NEOPLASM OF UNCERTAIN BEHAVIOR OF SKIN: ICD-10-CM

## 2018-05-11 DIAGNOSIS — D22.9 NEVUS: ICD-10-CM

## 2018-05-11 DIAGNOSIS — L57.0 AK (ACTINIC KERATOSIS): ICD-10-CM

## 2018-05-11 DIAGNOSIS — L81.4 LENTIGO: Primary | ICD-10-CM

## 2018-05-11 DIAGNOSIS — L82.1 SK (SEBORRHEIC KERATOSIS): ICD-10-CM

## 2018-05-11 PROCEDURE — 99999 PR PBB SHADOW E&M-EST. PATIENT-LVL III: CPT | Mod: PBBFAC,,, | Performed by: DERMATOLOGY

## 2018-05-11 PROCEDURE — 99202 OFFICE O/P NEW SF 15 MIN: CPT | Mod: 25,S$GLB,, | Performed by: DERMATOLOGY

## 2018-05-11 PROCEDURE — 11100 PR BIOPSY OF SKIN LESION: CPT | Mod: 59,S$GLB,, | Performed by: DERMATOLOGY

## 2018-05-11 PROCEDURE — 88305 TISSUE EXAM BY PATHOLOGIST: CPT | Performed by: PATHOLOGY

## 2018-05-11 PROCEDURE — 17000 DESTRUCT PREMALG LESION: CPT | Mod: S$GLB,,, | Performed by: DERMATOLOGY

## 2018-05-11 RX ORDER — TRETINOIN 0.04 MG/G
GEL TOPICAL
Qty: 45 G | Refills: 3 | Status: SHIPPED | OUTPATIENT
Start: 2018-05-11 | End: 2019-01-17

## 2018-05-11 NOTE — PROGRESS NOTES
Subjective:       Patient ID:  Aleksandra Santana is a 67 y.o. female who presents for   Chief Complaint   Patient presents with    Spot     Chest     Spot  - Initial  Affected locations: chest  Duration: 6 months  Signs / symptoms: scaling and irritated  Severity: mild  Aggravated by: nothing  Relieving factors/Treatments tried: nothing  Improvement on treatment: no relief      No personal history of skin cancer or atypical moles.  Interested in upper body skin check today.    Would also like a refill of retin-a micro for sunspots on her face.    Review of Systems   Constitutional: Negative for fever, chills and fatigue.   Skin: Positive for activity-related sunscreen use. Negative for itching and rash.   Hematologic/Lymphatic: Bruises/bleeds easily.        Objective:    Physical Exam   Constitutional: She appears well-developed and well-nourished. No distress.   Neurological: She is alert and oriented to person, place, and time. She is not disoriented.   Psychiatric: She has a normal mood and affect.   Skin:   Areas Examined (abnormalities noted in diagram):   Head / Face Inspection Performed  Neck Inspection Performed  Chest / Axilla Inspection Performed  Abdomen Inspection Performed  Back Inspection Performed  RUE Inspected  LUE Inspection Performed                       Diagram Legend     Erythematous scaling macule/papule c/w actinic keratosis       Vascular papule c/w angioma      Pigmented verrucoid papule/plaque c/w seborrheic keratosis      Yellow umbilicated papule c/w sebaceous hyperplasia      Irregularly shaped tan macule c/w lentigo     1-2 mm smooth white papules consistent with Milia      Movable subcutaneous cyst with punctum c/w epidermal inclusion cyst      Subcutaneous movable cyst c/w pilar cyst      Firm pink to brown papule c/w dermatofibroma      Pedunculated fleshy papule(s) c/w skin tag(s)      Evenly pigmented macule c/w junctional nevus     Mildly variegated pigmented, slightly  irregular-bordered macule c/w mildly atypical nevus      Flesh colored to evenly pigmented papule c/w intradermal nevus       Pink pearly papule/plaque c/w basal cell carcinoma      Erythematous hyperkeratotic cursted plaque c/w SCC      Surgical scar with no sign of skin cancer recurrence      Open and closed comedones      Inflammatory papules and pustules      Verrucoid papule consistent consistent with wart     Erythematous eczematous patches and plaques     Dystrophic onycholytic nail with subungual debris c/w onychomycosis     Umbilicated papule    Erythematous-base heme-crusted tan verrucoid plaque consistent with inflamed seborrheic keratosis     Erythematous Silvery Scaling Plaque c/w Psoriasis     See annotation      Assessment / Plan:      Neoplasm of uncertain behavior of skin  Shave biopsy procedure note:    Shave biopsy performed after verbal consent including risk of infection, scar, recurrence, need for additional treatment of site. Area prepped with alcohol, anesthetized with approximately 1.0cc of 1% lidocaine with epinephrine. Lesional tissue shaved with razor blade. Hemostasis achieved with application of aluminum chloride followed by hyfrecation. No complications. Dressing applied. Wound care explained.  -     Tissue Specimen To Pathology, Dermatology  Pathology Orders:     Normal Orders This Visit    Tissue Specimen To Pathology, Dermatology     Questions:    Directional Terms:  Other(comment)    Clinical information:  r/o BCC vs. scar from excoriated SK vs. other Comment - shave    Specific Site:  R upper back        Lentigo  -     tretinoin microspheres (RETIN-A MICRO) 0.04 % gel; Apply small amount to entire face qhs. Wash off qam and apply sunscreen.  Dispense: 45 g; Refill: 3  Counseled pt that the retinoid may make the skin dry, red, and irritated. May moisturize daily with a non-comedogenic moisturizer. If still dry, would recommend using the retinoid every other night or less frequently  as tolerated. Avoid using around corners of eyes, nose, and mouth.  Patient instructed in importance of daily broad spectrum sunscreen use with spf at least 30. Sun avoidance and topical protection/protective clothing discussed.    AK (actinic keratosis)  Cryosurgery Procedure Note    Verbal consent from the patient is obtained including, but not limited to, risk of hypopigmentation/hyperpigmentation, scar, recurrence of lesion. The patient is aware of the precancerous quality and need for treatment of these lesions. Liquid nitrogen cryosurgery is applied to the 1 actinic keratoses, as detailed in the physical exam, to produce a freeze injury. The patient is aware that blisters may form and is instructed on wound care with gentle cleansing and use of vaseline ointment to keep moist until healed. The patient is supplied a handout on cryosurgery and is instructed to call if lesions do not completely resolve.    SK (seborrheic keratosis)  These are benign inherited growths without a malignant potential. Reassurance given to patient. No treatment is necessary.   Treatment of benign, asymptomatic lesions may be considered cosmetic.  Warned about risk of hypo- or hyperpigmentation with treatment and risk of recurrence.    Nevus  Benign-appearing on exam today. Counseled pt to monitor mole(s) and return to clinic if any changes noted or symptoms (bleeding, itching, pain, etc) noted. Brochure provided.    Skin cancer screening  Upper body skin examination performed today including at least 6 points as noted in physical examination. Suspicious lesions noted above.  Patient instructed in importance of daily broad spectrum sunscreen use with spf at least 30. Sun avoidance and topical protection/protective clothing discussed.      Follow-up in about 6 months (around 11/11/2018) for skin check or sooner pending biopsy results.

## 2018-05-11 NOTE — PATIENT INSTRUCTIONS
CRYOSURGERY      Your doctor has used a method called cryosurgery to treat your skin condition. Cryosurgery refers to the use of very cold substances to treat a variety of skin conditions such as warts, pre-skin cancers, molluscum contagiosum, sun spots, and several benign growths. The substance we use in cryosurgery is liquid nitrogen and is so cold (-195 degrees Celsius) that is burns when administered.     Following treatment in the office, the skin may immediately burn and become red. You may find the area around the lesion is affected as well. It is sometimes necessary to treat not only the lesion, but a small area of the surrounding normal skin to achieve a good response.     A blister, and even a blood filled blister, may form after treatment.   This is a normal response. If the blister is painful, it is acceptable to sterilize a needle and with rubbing alcohol and gently pop the blister. It is important that you gently wash the area with soap and warm water as the blister fluid may contain wart virus if a wart was treated. Do no remove the roof of the blister.     The area treated can take anywhere from 1-3 weeks to heal. Healing time depends on the kind skin lesion treated, the location, and how aggressively the lesion was treated. It is recommended that the areas treated are covered with Vaseline or bacitracin ointment and a band-aid. If a band-aid is not practical, just ointment applied several times per day will do. Keeping these areas moist will speed the healing time.    Treatment with liquid nitrogen can leave a scar. In dark skin, it may be a light or dark scar, in light skin it may be a white or pink scar. These will generally fade with time, but may never go away completely.     If you have any concerns after your treatment, please feel free to call the office.       [address] [place] [address]/ [phone] FAX/ [email or web address]     Shave Biopsy Wound Care    Your  doctor has performed a shave biopsy today.  A band aid and vaseline ointment has been placed over the site.  This should remain in place for 24 hours.  It is recommended that you keep the area dry for the first 24 hours.  After 24 hours, you may remove the band aid and wash the area with warm soap and water and apply Vaseline jelly.  Many patients prefer to use Neosporin or Bacitracin ointment.  This is acceptable; however, know that you can develop an allergy to this medication even if you have used it safely for years.  It is important to keep the area moist.  Letting it dry out and get air slows healing time, and will worsen the scar.  Band aid is optional after first 24 hours.      If you notice increasing redness, tenderness, pain, or yellow drainage at the biopsy site, please notify your doctor.  These are signs of an infection.    If your biopsy site is bleeding, apply firm pressure for 15 minutes straight.  Repeat for another 15 minutes, if it is still bleeding.   If the surgical site continues to bleed, then please contact your doctor.      [address] [place] [address]/ [phone] FAX/ [email or web address]      Summer Sun Protection      The [facility] would like to remind you of the importance of sun protection all year round and particularly during the summer when the suns rays are the strongest. It has been proven that both acute and chronic sun exposure damages our cells and leads to skin cancer. Beyond skin cancer, the sun causes 90% of the symptoms of pre-mature skin aging, including wrinkles, lentigines (brown spots), and thin, easily bruised skin. Proper sun protection can help prevent these unwanted conditions.    Many patients report that the dont go in the sun. It has been shown that the average person receives 18 hours of incidental sun exposure per week during activities such as walking through parking lots, driving, or sitting next to  windows. This accumulates to several bad sunburns per year!    In choosing sunscreen, you want one that protects against both UVA and UVB rays. It is recommended that you use one of SPF 30 or higher. It is important to apply the sunscreen about 20 minutes prior to sun exposure. Most sunscreens are chemical sunscreens and a reaction must take place in the skin so that they are effective. If they are applied and then you are immediately exposed to the sun or start sweating, this reaction has not had time to take place and you are therefore unprotected. Sunscreen needs to be reapplied every 2 hours if you are participating in water sports or sweating. We recommend Elta MD or Neutrogena Ultra Sheer Dry Touch SPF 55 for daily use; however there are many options and it is most important for you to find one that you will use on a consistent basis.    If you have sensitive skin, you may do best with a sunscreen that contains only physical blockers such as titanium dioxide or zinc oxide. These are typically thicker and harder to apply, however they afford very good protection. Neutrogena Sensitive Skin, Blue Lizard Sensitive Skin (pink top) or Neutrogena Pure and Free are popular ones.     Aside from sunscreen, clothes with UV protection, wide brimmed hats, and sunglasses are other means of sun protection that we recommend.                        [facility] - DERMATOLOGY  [address]  [place] [address]  Dept: [phone]  Dept Fax: [phone]

## 2018-06-13 ENCOUNTER — TELEPHONE (OUTPATIENT)
Dept: FAMILY MEDICINE | Facility: CLINIC | Age: 68
End: 2018-06-13

## 2018-06-13 NOTE — TELEPHONE ENCOUNTER
PA  for medication. New PA for the year is due. [facility]

## 2018-06-13 NOTE — TELEPHONE ENCOUNTER
----- Message from Arnaldo Garcia sent at 6/6/2018  4:46 PM CDT -----  Contact: [phone]/PT  Calling to speak with nurse regarding a Pa that's needed for [employer] or drugstore for Premarin

## 2018-06-15 ENCOUNTER — TELEPHONE (OUTPATIENT)
Dept: DERMATOLOGY | Facility: CLINIC | Age: 68
End: 2018-06-15

## 2018-06-15 NOTE — TELEPHONE ENCOUNTER
----- Message from Rosie Kevin sent at 6/15/2018  9:11 AM CDT -----  Contact: pt at [phone]  Paul pt-Pt is scheduled for a procedure today and is not feeling well today.  How long do you think she would have to wait if she doesn't come in for her 1115 appt today?  Will come if she has to.

## 2018-06-19 ENCOUNTER — OFFICE VISIT (OUTPATIENT)
Dept: URGENT CARE | Facility: CLINIC | Age: 68
End: 2018-06-19
Payer: MEDICARE

## 2018-06-19 VITALS
RESPIRATION RATE: 12 BRPM | HEART RATE: 76 BPM | BODY MASS INDEX: 25.3 KG/M2 | WEIGHT: 134 LBS | OXYGEN SATURATION: 99 % | SYSTOLIC BLOOD PRESSURE: 126 MMHG | TEMPERATURE: 98 F | HEIGHT: 61 IN | DIASTOLIC BLOOD PRESSURE: 60 MMHG

## 2018-06-19 DIAGNOSIS — J01.40 ACUTE NON-RECURRENT PANSINUSITIS: Primary | ICD-10-CM

## 2018-06-19 DIAGNOSIS — H10.9 CONJUNCTIVITIS OF LEFT EYE, UNSPECIFIED CONJUNCTIVITIS TYPE: ICD-10-CM

## 2018-06-19 PROCEDURE — 99214 OFFICE O/P EST MOD 30 MIN: CPT | Mod: S$GLB,,, | Performed by: SURGERY

## 2018-06-19 PROCEDURE — 3074F SYST BP LT 130 MM HG: CPT | Mod: CPTII,S$GLB,, | Performed by: SURGERY

## 2018-06-19 PROCEDURE — 3078F DIAST BP <80 MM HG: CPT | Mod: CPTII,S$GLB,, | Performed by: SURGERY

## 2018-06-19 RX ORDER — PREDNISONE 20 MG/1
20 TABLET ORAL 2 TIMES DAILY
Qty: 10 TABLET | Refills: 0 | Status: SHIPPED | OUTPATIENT
Start: 2018-06-19 | End: 2018-06-24

## 2018-06-19 RX ORDER — CEFUROXIME AXETIL 250 MG/1
250 TABLET ORAL 2 TIMES DAILY
Qty: 20 TABLET | Refills: 0 | Status: SHIPPED | OUTPATIENT
Start: 2018-06-19 | End: 2018-06-29

## 2018-06-19 RX ORDER — POLYMYXIN B SULFATE AND TRIMETHOPRIM 1; 10000 MG/ML; [USP'U]/ML
1 SOLUTION OPHTHALMIC EVERY 4 HOURS
Qty: 1 BOTTLE | Refills: 0 | Status: SHIPPED | OUTPATIENT
Start: 2018-06-19 | End: 2019-09-12

## 2018-06-19 NOTE — PROGRESS NOTES
"Subjective:       Patient ID: Aleksandra Santana is a 68 y.o. female.    Vitals:  height is 5' 1" (1.549 m) and weight is 60.8 kg (134 lb). Her oral temperature is 97.9 °F (36.6 °C). Her blood pressure is 126/60 and her pulse is 76. Her respiration is 12 and oxygen saturation is 99%.     Chief Complaint: URI    URI    This is a new problem. The current episode started in the past 7 days. The problem has been gradually worsening. The maximum temperature recorded prior to her arrival was 101 - 101.9 F. The fever has been present for 1 to 2 days. Associated symptoms include congestion, coughing, headaches, joint pain, nausea, a plugged ear sensation, rhinorrhea, sinus pain, sneezing, a sore throat and swollen glands. Pertinent negatives include no abdominal pain, chest pain, diarrhea, dysuria, ear pain, joint swelling, neck pain, rash, vomiting or wheezing. She has tried decongestant, antihistamine, acetaminophen and NSAIDs for the symptoms. The treatment provided mild relief.     Review of Systems   Constitution: Positive for chills, fever and malaise/fatigue.   HENT: Positive for congestion, hoarse voice, rhinorrhea, sinus pain, sneezing and sore throat. Negative for ear pain.    Eyes: Positive for discharge. Negative for redness.   Cardiovascular: Negative for chest pain, dyspnea on exertion and leg swelling.   Respiratory: Positive for cough and sputum production. Negative for shortness of breath and wheezing.    Skin: Negative for rash.   Musculoskeletal: Positive for joint pain and myalgias. Negative for neck pain.   Gastrointestinal: Positive for nausea. Negative for abdominal pain, diarrhea and vomiting.   Genitourinary: Negative for dysuria.   Neurological: Positive for headaches.   All other systems reviewed and are negative.      Objective:      Physical Exam   Constitutional: She is oriented to person, place, and time. She appears well-developed and well-nourished. She is cooperative.  Non-toxic appearance. She " does not appear ill. No distress.   HENT:   Head: Normocephalic and atraumatic.   Right Ear: Hearing, tympanic membrane, external ear and ear canal normal.   Left Ear: Hearing, tympanic membrane, external ear and ear canal normal.   Nose: Mucosal edema and rhinorrhea present. No nasal deformity. No epistaxis. Right sinus exhibits maxillary sinus tenderness and frontal sinus tenderness. Left sinus exhibits maxillary sinus tenderness and frontal sinus tenderness.   Mouth/Throat: Uvula is midline and mucous membranes are normal. No trismus in the jaw. Normal dentition. No uvula swelling. Posterior oropharyngeal edema and posterior oropharyngeal erythema present. Tonsils are 0 on the right. Tonsils are 0 on the left. No tonsillar exudate.   Purulent postnasal drip   Eyes: Conjunctivae and lids are normal. Left eye exhibits exudate. No scleral icterus.   Sclera injected left   Neck: Trachea normal, full passive range of motion without pain and phonation normal. Neck supple.   Cardiovascular: Normal rate, regular rhythm, normal heart sounds, intact distal pulses and normal pulses.    Pulmonary/Chest: Effort normal and breath sounds normal. No respiratory distress.   Abdominal: Soft. Normal appearance and bowel sounds are normal. She exhibits no distension. There is no tenderness.   Musculoskeletal: Normal range of motion. She exhibits no edema or deformity.   Neurological: She is alert and oriented to person, place, and time. She exhibits normal muscle tone. Coordination normal.   Skin: Skin is warm, dry and intact. She is not diaphoretic. No pallor.   Psychiatric: She has a normal mood and affect. Her speech is normal and behavior is normal. Judgment and thought content normal. Cognition and memory are normal.   Nursing note and vitals reviewed.      Assessment:       1. Acute non-recurrent pansinusitis    2. Conjunctivitis of left eye, unspecified conjunctivitis type        Plan:         Acute non-recurrent  pansinusitis  -     cefUROXime (CEFTIN) 250 MG tablet; Take 1 tablet (250 mg total) by mouth 2 (two) times daily.  Dispense: 20 tablet; Refill: 0  -     predniSONE (DELTASONE) 20 MG tablet; Take 1 tablet (20 mg total) by mouth 2 (two) times daily.  Dispense: 10 tablet; Refill: 0    Conjunctivitis of left eye, unspecified conjunctivitis type  -     polymyxin B sulf-trimethoprim (POLYTRIM) 10,000 unit- 1 mg/mL Drop; Place 1 drop into the right eye every 4 (four) hours.  Dispense: 1 Bottle; Refill: 0

## 2018-06-19 NOTE — PATIENT INSTRUCTIONS
Sinusitis (Antibiotic Treatment)    The sinuses are air-filled spaces within the bones of the face. They connect to the inside of the nose. Sinusitis is an inflammation of the tissue lining the sinus cavity. Sinus inflammation can occur during a cold. It can also be due to allergies to pollens and other particles in the air. Sinusitis can cause symptoms of sinus congestion and fullness. A sinus infection causes fever, headache and facial pain. There is often green or yellow drainage from the nose or into the back of the throat (post-nasal drip). You have been given antibiotics to treat this condition.  Home care:  · Take the full course of antibiotics as instructed. Do not stop taking them, even if you feel better.  · Drink plenty of water, hot tea, and other liquids. This may help thin mucus. It also may promote sinus drainage.  · Heat may help soothe painful areas of the face. Use a towel soaked in hot water. Or,  the shower and direct the hot spray onto your face. Using a vaporizer along with a menthol rub at night may also help.   · An expectorant containing guaifenesin may help thin the mucus and promote drainage from the sinuses.  · Over-the-counter decongestants may be used unless a similar medicine was prescribed. Nasal sprays work the fastest. Use one that contains phenylephrine or oxymetazoline. First blow the nose gently. Then use the spray. Do not use these medicines more often than directed on the label or symptoms may get worse. You may also use tablets containing pseudoephedrine. Avoid products that combine ingredients, because side effects may be increased. Read labels. You can also ask the pharmacist for help. (NOTE: Persons with high blood pressure should not use decongestants. They can raise blood pressure.)  · Over-the-counter antihistamines may help if allergies contributed to your sinusitis.    · Do not use nasal rinses or irrigation during an acute sinus infection, unless told to by  your health care provider. Rinsing may spread the infection to other sinuses.  · Use acetaminophen or ibuprofen to control pain, unless another pain medicine was prescribed. (If you have chronic liver or kidney disease or ever had a stomach ulcer, talk with your doctor before using these medicines. Aspirin should never be used in anyone under 18 years of age who is ill with a fever. It may cause severe liver damage.)  · Don't smoke. This can worsen symptoms.  Follow-up care  Follow up with your healthcare provider or our staff if you are not improving within the next week.  When to seek medical advice  Call your healthcare provider if any of these occur:  · Facial pain or headache becoming more severe  · Stiff neck  · Unusual drowsiness or confusion  · Swelling of the forehead or eyelids  · Vision problems, including blurred or double vision  · Fever of 100.4ºF (38ºC) or higher, or as directed by your healthcare provider  · Seizure  · Breathing problems  · Symptoms not resolving within 10 days  Date Last Reviewed: 4/13/2015  © 0978-7633 [employer]. [address] [place] [address], [place] [address]. All rights reserved. This information is not intended as a substitute for professional medical care. Always follow your healthcare professional's instructions.

## 2018-06-22 ENCOUNTER — PES CALL (OUTPATIENT)
Dept: ADMINISTRATIVE | Facility: CLINIC | Age: 68
End: 2018-06-22

## 2018-06-22 ENCOUNTER — PROCEDURE VISIT (OUTPATIENT)
Dept: DERMATOLOGY | Facility: CLINIC | Age: 68
End: 2018-06-22
Payer: MEDICARE

## 2018-06-22 DIAGNOSIS — C44.91 SUPERFICIAL BASAL CELL CARCINOMA: Primary | ICD-10-CM

## 2018-06-22 PROCEDURE — 99213 OFFICE O/P EST LOW 20 MIN: CPT | Mod: S$GLB,,, | Performed by: DERMATOLOGY

## 2018-06-22 RX ORDER — FLUOROURACIL 50 MG/G
CREAM TOPICAL
Qty: 40 G | Refills: 1 | Status: SHIPPED | OUTPATIENT
Start: 2018-06-22 | End: 2019-09-12

## 2018-06-24 NOTE — PROGRESS NOTES
Subjective:       Patient ID:  Aleksandra Santana is a 68 y.o. female who presents for   Chief Complaint   Patient presents with    Basal Cell Carcinoma     HPI  Pt here today for treatment of bx-proven superficial BCC on R upper back.    Review of Systems   Constitutional: Negative for fever and chills.   Skin: Negative for itching and rash.        Objective:    Physical Exam   Constitutional: She appears well-developed and well-nourished. No distress.   Neurological: She is alert and oriented to person, place, and time. She is not disoriented.   Psychiatric: She has a normal mood and affect.   Skin:   Areas Examined (abnormalities noted in diagram):   Back Inspection Performed              Diagram Legend     Erythematous scaling macule/papule c/w actinic keratosis       Vascular papule c/w angioma      Pigmented verrucoid papule/plaque c/w seborrheic keratosis      Yellow umbilicated papule c/w sebaceous hyperplasia      Irregularly shaped tan macule c/w lentigo     1-2 mm smooth white papules consistent with Milia      Movable subcutaneous cyst with punctum c/w epidermal inclusion cyst      Subcutaneous movable cyst c/w pilar cyst      Firm pink to brown papule c/w dermatofibroma      Pedunculated fleshy papule(s) c/w skin tag(s)      Evenly pigmented macule c/w junctional nevus     Mildly variegated pigmented, slightly irregular-bordered macule c/w mildly atypical nevus      Flesh colored to evenly pigmented papule c/w intradermal nevus       Pink pearly papule/plaque c/w basal cell carcinoma      Erythematous hyperkeratotic cursted plaque c/w SCC      Surgical scar with no sign of skin cancer recurrence      Open and closed comedones      Inflammatory papules and pustules      Verrucoid papule consistent consistent with wart     Erythematous eczematous patches and plaques     Dystrophic onycholytic nail with subungual debris c/w onychomycosis     Umbilicated papule    Erythematous-base heme-crusted tan verrucoid  plaque consistent with inflamed seborrheic keratosis     Erythematous Silvery Scaling Plaque c/w Psoriasis     See annotation      Assessment / Plan:        Superficial basal cell carcinoma  Discussed treatment options including excision with sutures, electrodesiccation and curettage, and topical chemotherapy with risks/benefits of each. Pt chose to use Efudex.  -     fluorouracil (EFUDEX) 5 % cream; AAA BID x 4-6 weeks. Stop if blistered, oozing, or bleeding. Use daily sun protection.  Dispense: 40 g; Refill: 1    Counseled extensively regarding the proper use and side effects of efudex. Pt will discontinue use if areas begin to ulcerate, bleed, blister, etc.    rtc 2 months for skin check or sooner for any concerns

## 2018-07-24 ENCOUNTER — OFFICE VISIT (OUTPATIENT)
Dept: FAMILY MEDICINE | Facility: CLINIC | Age: 68
End: 2018-07-24
Payer: MEDICARE

## 2018-07-24 VITALS
HEART RATE: 66 BPM | BODY MASS INDEX: 25.18 KG/M2 | OXYGEN SATURATION: 98 % | WEIGHT: 133.38 LBS | SYSTOLIC BLOOD PRESSURE: 132 MMHG | TEMPERATURE: 98 F | DIASTOLIC BLOOD PRESSURE: 70 MMHG | HEIGHT: 61 IN

## 2018-07-24 DIAGNOSIS — Z12.31 ENCOUNTER FOR SCREENING MAMMOGRAM FOR BREAST CANCER: ICD-10-CM

## 2018-07-24 DIAGNOSIS — M81.0 OSTEOPOROSIS, POSTMENOPAUSAL: ICD-10-CM

## 2018-07-24 DIAGNOSIS — I10 ESSENTIAL HYPERTENSION: ICD-10-CM

## 2018-07-24 DIAGNOSIS — M70.62 TROCHANTERIC BURSITIS OF LEFT HIP: ICD-10-CM

## 2018-07-24 DIAGNOSIS — E03.9 HYPOTHYROIDISM, UNSPECIFIED TYPE: ICD-10-CM

## 2018-07-24 DIAGNOSIS — E55.9 VITAMIN D DEFICIENCY DISEASE: ICD-10-CM

## 2018-07-24 DIAGNOSIS — Z86.010 HISTORY OF COLONIC POLYPS: ICD-10-CM

## 2018-07-24 DIAGNOSIS — Z00.00 ROUTINE MEDICAL EXAM: Primary | ICD-10-CM

## 2018-07-24 DIAGNOSIS — D64.9 ANEMIA, UNSPECIFIED TYPE: ICD-10-CM

## 2018-07-24 PROCEDURE — 3078F DIAST BP <80 MM HG: CPT | Mod: CPTII,S$GLB,, | Performed by: INTERNAL MEDICINE

## 2018-07-24 PROCEDURE — 3075F SYST BP GE 130 - 139MM HG: CPT | Mod: CPTII,S$GLB,, | Performed by: INTERNAL MEDICINE

## 2018-07-24 PROCEDURE — 99214 OFFICE O/P EST MOD 30 MIN: CPT | Mod: 25,S$GLB,, | Performed by: INTERNAL MEDICINE

## 2018-07-24 PROCEDURE — 99999 PR PBB SHADOW E&M-EST. PATIENT-LVL III: CPT | Mod: PBBFAC,,, | Performed by: INTERNAL MEDICINE

## 2018-07-24 NOTE — PROGRESS NOTES
Chief complaint, physical exam, patient did arrive over 20 minutes after her stated appointment time last year and 7 minutes this year and was still seen    68-year-old white female who admittedly does not like going to the doctor.  She is here for her physical.  Regarding health maintenance it's been about 1 year since she had lab work.  She is overdue for mammogram.  She is up-to-date on her colonoscopy -2019.  Due to general orthopedic issues she has been exercising less and think she is getting deconditioned.        ROS:   CONST: weight stable. EYES: no vision change. ENT: no sore throat. CV: no chest pain w/ exertion. RESP:No orthopnea PND or cough. GI: no nausea, vomiting, diarrhea. No dysphagia. : no urinary issues. MUSCULOSKELETAL: no new myalgias or arthralgias except for some issues with the left shoulder blade and left medial elbow SKIN: no new changes. NEURO: no focal deficits. PSYCH: no new issues. ENDOCRINE: no polyuria. HEME: no lymph nodes. ALLERGY: no general pruritis.     New to Dr MARIN    PAST MEDICAL HISTORY:    1. Endometriosis,   2. Hypertension -was followed by Nephrology in the past, Onset age 24          3. Osteoarthritis,Hands back and neck  4. Migraine.  5. IBS with constipation- was seen by [facility]  6. H/o palpitations- neg w/u by Cards  7. Anxiety with h/o panic attacks  8. GERD - Esophagitis/gastritis by EGD , hiatal hernia on EGD     9. HYPOthyroidism -saw Endo in the past.  10. Osteoporosis by BMD 2006 by Dr Garcia. Osteopenia , intolerant to Fosamax orally in the past  11. Colonoscopy with one polyp 2014, 5 years  12.  Left medial epicondylitis                                                                                                                         PAST SURGICAL HISTORY:  Hysterectomy, a , surgeries for             endometriosis, and tonsillectomy and appendectomy.                                                                                                         SOCIAL HISTORY:  No tobacco use.  Drinks 2-3 glasses of wine every day or    every other day.      Vital signs as above  Gen: no distress  EYES: conjunctiva clear, non-icteric, PERRL  ENT: nose clear, nasal mucosa normal, oropharynx clear and moist, teeth good  NECK:supple, thyroid non-palpable  RESP: effort is good, lungs clear  CV: heart RRR w/o murmur, gallops or rubs; no carotid bruits, no edema  GI: abdomen soft, non-distended, non-tender, no hepatosplenomegaly  MS: gait normal, no clubbing or cyanosis of the digits, patient has reproducible pain at the left medial epicondyle related to the muscles.  Worse with hand  which is 5 out of 5.  No reproducible shoulder scapula pain today.  The elbow itself is nontender with full range of motion without pain.  SKIN: no rashes, warm to touch    Aleksandra was seen today for annual exam.    Diagnoses and all orders for this visit:    Routine medical exam, we'll update labs which are overdue as well as mammogram.  Encouraged her to exercise is much as her orthopedic issues allowed.  Avoid deconditioning.    Encounter for screening mammogram for breast cancer  -     Mammo Digital Screening Bilat with CAD; Future                                        Additional evaluation and management issues:    Additionally, patient has numerous other medical issues and complaints to address today separate from her physical.  We reviewed her bone density from 2 years ago and explained the pathophysiology of OP and how fractures can lead to loss of independence and function.  We explained all the available treatments.  She didn't probably take Fosamax in the past and after the dosing cause significant nausea and vomiting.  She saw endocrinology 10/17 to discuss parenteral treatment .  Kelsea was recommended that she has not tried it as she was having a lot of IBS symptoms and she wanted to get this out of the way.  We discussed parenteral medications if she does not  tolerate the Boniva     She is under a lot of stress lately with her son who has a drug problem, she is currently living with one of her other sons    Regarding hypothyroidism she is due for annual assessment on her supplement.  She has hypertension which appears to be under good control and we will check electrolytes and renal function.  She has vitamin D deficiency which needs reassessment in light of her osteopenia.  She has chronic constipation which she appears to have under control at this time.    Continues with left lateral hip pain which is consistent with trochanteric bursitis.  Use visual a sitter describes a condition, showed her stretching exercises and will refer her to a local orthopedics for injection and so forth if needed.    Another new issue last year was a slight anemia.  All these various issues reviewed and patient counseled and her evaluation and management today will be based upon time counseling.  Total time over 25 minutes with over 50% counseling.          Assessment and plan:        Aleksandra was seen today for annual exam.        Essential hypertension, chronic and stable  -     Vitamin D; Future  -     Comprehensive metabolic panel; Future  -     Iron and TIBC; Future  -     Ferritin; Future  -     CBC auto differential; Future  -     TSH; Future  -     T4, free; Future  -     Lipid panel; Future    Hypothyroidism, unspecified type, reassess  -     TSH; Future  -     T4, free; Future    Anemia, unspecified type, reassess-     Iron and TIBC; Future  -     Ferritin; Future  -     CBC auto differential; Future    History of colonic polyps, up-to-date    Osteoporosis, postmenopausal, encouraged her to try the oral Boniva    Vitamin D deficiency disease, reassess  -     Vitamin D; Future    Trochanteric bursitis of left hip, instructed on treatment as above  -     Ambulatory referral to Orthopedics          Based on patient's anxiety expressed referable to all the above issues as well as to  "avoid confusion regarding evaluation and management coverage issues, access would not be therapeutic"This note will not be shared with the patient."  "

## 2018-07-25 ENCOUNTER — TELEPHONE (OUTPATIENT)
Dept: FAMILY MEDICINE | Facility: CLINIC | Age: 68
End: 2018-07-25

## 2018-07-25 NOTE — LETTER
July 26, 2018    Aleksandra RADHA Esther  [address] [place] [address]             [facility]  [address] [place] [address]  Phone: [phone]  Fax: [phone] Dear MsBrijesh Esther:    I have been unable to reach you by phone for your appointment to Orthopedic .  Please call me at the clinic [phone] to book your appointment.      If you have any questions or concerns, please don't hesitate to call.    Sincerely,        Amaya Gomez MA

## 2018-07-27 RX ORDER — CARVEDILOL 25 MG/1
TABLET ORAL
Qty: 30 TABLET | Refills: 1 | Status: SHIPPED | OUTPATIENT
Start: 2018-07-27 | End: 2018-12-22 | Stop reason: SDUPTHER

## 2018-08-02 ENCOUNTER — HOSPITAL ENCOUNTER (OUTPATIENT)
Dept: RADIOLOGY | Facility: HOSPITAL | Age: 68
Discharge: HOME | End: 2018-08-02
Attending: INTERNAL MEDICINE
Payer: MEDICARE

## 2018-08-02 DIAGNOSIS — Z12.31 ENCOUNTER FOR SCREENING MAMMOGRAM FOR BREAST CANCER: ICD-10-CM

## 2018-08-02 PROCEDURE — 77063 BREAST TOMOSYNTHESIS BI: CPT | Mod: TC,PO

## 2018-08-02 PROCEDURE — 77063 BREAST TOMOSYNTHESIS BI: CPT | Mod: 26,,, | Performed by: RADIOLOGY

## 2018-08-02 PROCEDURE — 77067 SCR MAMMO BI INCL CAD: CPT | Mod: 26,,, | Performed by: RADIOLOGY

## 2018-08-06 ENCOUNTER — TELEPHONE (OUTPATIENT)
Dept: RADIOLOGY | Facility: HOSPITAL | Age: 68
End: 2018-08-06

## 2018-08-09 ENCOUNTER — HOSPITAL ENCOUNTER (OUTPATIENT)
Dept: RADIOLOGY | Facility: HOSPITAL | Age: 68
Discharge: HOME | End: 2018-08-09
Attending: INTERNAL MEDICINE
Payer: MEDICARE

## 2018-08-09 DIAGNOSIS — R92.8 ABNORMAL MAMMOGRAM: ICD-10-CM

## 2018-08-09 PROCEDURE — 77065 DX MAMMO INCL CAD UNI: CPT | Mod: TC

## 2018-08-09 PROCEDURE — 77065 DX MAMMO INCL CAD UNI: CPT | Mod: 26,,, | Performed by: RADIOLOGY

## 2018-08-09 PROCEDURE — 77061 BREAST TOMOSYNTHESIS UNI: CPT | Mod: TC

## 2018-08-09 PROCEDURE — 77061 BREAST TOMOSYNTHESIS UNI: CPT | Mod: 26,,, | Performed by: RADIOLOGY

## 2018-08-24 ENCOUNTER — PATIENT MESSAGE (OUTPATIENT)
Dept: FAMILY MEDICINE | Facility: CLINIC | Age: 68
End: 2018-08-24

## 2018-08-31 ENCOUNTER — PES CALL (OUTPATIENT)
Dept: ADMINISTRATIVE | Facility: CLINIC | Age: 68
End: 2018-08-31

## 2018-09-04 RX ORDER — LEVOTHYROXINE SODIUM 75 UG/1
TABLET ORAL
Qty: 90 TABLET | Refills: 1 | Status: SHIPPED | OUTPATIENT
Start: 2018-09-04 | End: 2019-01-28

## 2018-09-04 RX ORDER — HYDROCHLOROTHIAZIDE 25 MG/1
TABLET ORAL
Qty: 90 TABLET | Refills: 1 | Status: SHIPPED | OUTPATIENT
Start: 2018-09-04 | End: 2019-04-19 | Stop reason: SDUPTHER

## 2018-09-18 ENCOUNTER — PATIENT MESSAGE (OUTPATIENT)
Dept: FAMILY MEDICINE | Facility: CLINIC | Age: 68
End: 2018-09-18

## 2018-09-18 DIAGNOSIS — E03.9 HYPOTHYROIDISM, UNSPECIFIED TYPE: Primary | ICD-10-CM

## 2018-09-18 NOTE — TELEPHONE ENCOUNTER
Set labs as requested by patient, she needs to message back with the time or contact her to ask, thanks

## 2018-09-24 ENCOUNTER — LAB VISIT (OUTPATIENT)
Dept: LAB | Facility: HOSPITAL | Age: 68
End: 2018-09-24
Attending: INTERNAL MEDICINE
Payer: MEDICARE

## 2018-09-24 DIAGNOSIS — E03.9 HYPOTHYROIDISM, UNSPECIFIED TYPE: ICD-10-CM

## 2018-09-24 LAB
T4 FREE SERPL-MCNC: 1.55 NG/DL
TSH SERPL DL<=0.005 MIU/L-ACNC: 2.89 UIU/ML

## 2018-09-24 PROCEDURE — 84443 ASSAY THYROID STIM HORMONE: CPT

## 2018-09-24 PROCEDURE — 36415 COLL VENOUS BLD VENIPUNCTURE: CPT | Mod: PO

## 2018-09-24 PROCEDURE — 84439 ASSAY OF FREE THYROXINE: CPT

## 2018-10-09 RX ORDER — AMLODIPINE BESYLATE AND OLMESARTAN MEDOXOMIL 5; 40 MG/1; MG/1
TABLET, FILM COATED ORAL
Qty: 90 TABLET | Refills: 1 | Status: SHIPPED | OUTPATIENT
Start: 2018-10-09 | End: 2018-11-16 | Stop reason: SDUPTHER

## 2018-11-16 RX ORDER — AMLODIPINE BESYLATE AND OLMESARTAN MEDOXOMIL 5; 40 MG/1; MG/1
TABLET, FILM COATED ORAL
Qty: 90 TABLET | Refills: 1 | Status: SHIPPED | OUTPATIENT
Start: 2018-11-16 | End: 2019-01-31

## 2018-12-12 RX ORDER — BUTALBITAL, ACETAMINOPHEN AND CAFFEINE 50; 325; 40 MG/1; MG/1; MG/1
TABLET ORAL
Qty: 30 TABLET | Refills: 0 | Status: SHIPPED | OUTPATIENT
Start: 2018-12-12 | End: 2019-01-17 | Stop reason: SDUPTHER

## 2018-12-13 ENCOUNTER — PATIENT MESSAGE (OUTPATIENT)
Dept: FAMILY MEDICINE | Facility: CLINIC | Age: 68
End: 2018-12-13

## 2018-12-13 DIAGNOSIS — E03.9 HYPOTHYROIDISM, UNSPECIFIED TYPE: Primary | ICD-10-CM

## 2018-12-20 ENCOUNTER — PATIENT MESSAGE (OUTPATIENT)
Dept: FAMILY MEDICINE | Facility: CLINIC | Age: 68
End: 2018-12-20

## 2018-12-21 ENCOUNTER — PATIENT MESSAGE (OUTPATIENT)
Dept: FAMILY MEDICINE | Facility: CLINIC | Age: 68
End: 2018-12-21

## 2018-12-23 RX ORDER — FOLIC ACID 1 MG/1
TABLET ORAL
Qty: 90 TABLET | Refills: 1 | Status: SHIPPED | OUTPATIENT
Start: 2018-12-23 | End: 2019-04-06 | Stop reason: SDUPTHER

## 2018-12-23 RX ORDER — CARVEDILOL 25 MG/1
TABLET ORAL
Qty: 30 TABLET | Refills: 1 | Status: SHIPPED | OUTPATIENT
Start: 2018-12-23 | End: 2019-02-06 | Stop reason: SDUPTHER

## 2018-12-31 ENCOUNTER — TELEPHONE (OUTPATIENT)
Dept: FAMILY MEDICINE | Facility: CLINIC | Age: 68
End: 2018-12-31

## 2018-12-31 NOTE — TELEPHONE ENCOUNTER
----- Message from Donita Goetz sent at 12/31/2018 12:27 PM CST -----  Contact: Self/ [phone]  Pt would like staff (Marlyn) to give her a call regarding prescription, the email she sent.  Thank you.

## 2018-12-31 NOTE — TELEPHONE ENCOUNTER
Me   to Aleksandra Santana        12/21/18 1:31 PM   Thank you for the additional information. I will forward message to Dr MARIN. Please keep in mind that he will be out until Monday 1/7/19.      Last read by Aleksandra Santana at 12:23 PM on 12/31/2018.

## 2019-01-04 ENCOUNTER — PATIENT MESSAGE (OUTPATIENT)
Dept: NEUROLOGY | Facility: CLINIC | Age: 69
End: 2019-01-04

## 2019-01-04 NOTE — TELEPHONE ENCOUNTER
Spoke with patient and informed her that she needs to follow up with Charissa before I can complete a Prior Authorization on her Fioricet.

## 2019-01-17 ENCOUNTER — OFFICE VISIT (OUTPATIENT)
Dept: NEUROLOGY | Facility: CLINIC | Age: 69
End: 2019-01-17
Payer: MEDICARE

## 2019-01-17 VITALS
BODY MASS INDEX: 25.34 KG/M2 | SYSTOLIC BLOOD PRESSURE: 106 MMHG | WEIGHT: 134.25 LBS | HEIGHT: 61 IN | HEART RATE: 62 BPM | DIASTOLIC BLOOD PRESSURE: 60 MMHG

## 2019-01-17 DIAGNOSIS — G43.009 MIGRAINE WITHOUT AURA AND WITHOUT STATUS MIGRAINOSUS, NOT INTRACTABLE: Primary | ICD-10-CM

## 2019-01-17 PROCEDURE — 99999 PR PBB SHADOW E&M-EST. PATIENT-LVL III: ICD-10-PCS | Mod: PBBFAC,HCNC,, | Performed by: NEUROMUSCULOSKELETAL MEDICINE & OMM

## 2019-01-17 PROCEDURE — 3078F DIAST BP <80 MM HG: CPT | Mod: CPTII,HCNC,S$GLB, | Performed by: NEUROMUSCULOSKELETAL MEDICINE & OMM

## 2019-01-17 PROCEDURE — 99213 PR OFFICE/OUTPT VISIT, EST, LEVL III, 20-29 MIN: ICD-10-PCS | Mod: HCNC,S$GLB,, | Performed by: NEUROMUSCULOSKELETAL MEDICINE & OMM

## 2019-01-17 PROCEDURE — 3074F PR MOST RECENT SYSTOLIC BLOOD PRESSURE < 130 MM HG: ICD-10-PCS | Mod: CPTII,HCNC,S$GLB, | Performed by: NEUROMUSCULOSKELETAL MEDICINE & OMM

## 2019-01-17 PROCEDURE — 1101F PT FALLS ASSESS-DOCD LE1/YR: CPT | Mod: CPTII,HCNC,S$GLB, | Performed by: NEUROMUSCULOSKELETAL MEDICINE & OMM

## 2019-01-17 PROCEDURE — 1101F PR PT FALLS ASSESS DOC 0-1 FALLS W/OUT INJ PAST YR: ICD-10-PCS | Mod: CPTII,HCNC,S$GLB, | Performed by: NEUROMUSCULOSKELETAL MEDICINE & OMM

## 2019-01-17 PROCEDURE — 99999 PR PBB SHADOW E&M-EST. PATIENT-LVL III: CPT | Mod: PBBFAC,HCNC,, | Performed by: NEUROMUSCULOSKELETAL MEDICINE & OMM

## 2019-01-17 PROCEDURE — 99213 OFFICE O/P EST LOW 20 MIN: CPT | Mod: HCNC,S$GLB,, | Performed by: NEUROMUSCULOSKELETAL MEDICINE & OMM

## 2019-01-17 PROCEDURE — 3074F SYST BP LT 130 MM HG: CPT | Mod: CPTII,HCNC,S$GLB, | Performed by: NEUROMUSCULOSKELETAL MEDICINE & OMM

## 2019-01-17 PROCEDURE — 3078F PR MOST RECENT DIASTOLIC BLOOD PRESSURE < 80 MM HG: ICD-10-PCS | Mod: CPTII,HCNC,S$GLB, | Performed by: NEUROMUSCULOSKELETAL MEDICINE & OMM

## 2019-01-17 RX ORDER — BUTALBITAL, ACETAMINOPHEN AND CAFFEINE 50; 325; 40 MG/1; MG/1; MG/1
1 TABLET ORAL EVERY 6 HOURS PRN
Qty: 30 TABLET | Refills: 0 | Status: SHIPPED | OUTPATIENT
Start: 2019-01-17 | End: 2019-08-06 | Stop reason: SDUPTHER

## 2019-01-17 NOTE — PROGRESS NOTES
History:  patient presents for follow-up for migraines.  She has 2 headaches per week at the present time and some months she has none.  Fioricet seems to work well.  Imitrex seems upper blood pressure.  The bad headaches have quieted down.  previous note:  3-14-18  Patient presents for follow-up for her headaches.  She describes a pressure pain in the head with associated photophobia and nausea and occasional vomiting.  She complains of feeling flushed in the face yesterday and today.  She initially blamed it on the Imitrex for which she took initially 100 mg 2 days ago for a headache and recently half of a 50 mg.  Yesterday she did not have the Imitrex and still had the flushing sensation in the face.  On further questioning patient has been having a lot of neck problems and treated by orthopedics with meloxicam 15 mg for her back and neck.  Patient took meloxicam over the last 3 days which in my opinion is probably the cause of her flushing sensation in the face and queasiness.  She has had Flexeril before but could not tolerate a full dose.  She has tried Robaxin and again could not tolerate those.  Previous note: 6-19-17:  Patient presents for follow-up for her headaches and new arm pain.  She complains of left shoulder p pain radiating into the arm and hand.  She was seen by orthopedics 3 weeks ago and had an injection in the shoulder blade area.  This seemed to help some.  The left arm pain occasionally will radiate down to the 2 middle fingers with tingling.  She is scheduled for pain management in July.  She notes that moving her neck to the left increases the left sciatic pain.  She was recently changed from meloxicam to Celebrex.  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  Overall her migraines have been much better.  Previous note: 2-7-17:  Patient presents for follow-up after several years.  She complains of a bad headache last week with pressure on top of the head and a  feeling of off balance to the right.  Headache was associated with nausea and vomiting with intensity of the headache up to 9/10.  She also has sinus problems which she thinks may trigger the headaches.  Imitrex does help.  She also has neck problems on a chronic basis.  She has some Flexeril tablets which I've encouraged her to try for the neck problems.  She is scheduled to see the ENT doctors for her sinus problems.           Gen. Appearance: Well-developed with no obvious deformities  Carotid arteries symmetrical pulses  Peripheral vascular shows symmetrical pulses with no obvious edema or tenderness  Neurological Exam:  Mental status-alert and oriented to person, place, and time; attention span and concentration is good. Fund of knowledge-patient is aware of current events and able to give detailed history of the current problem.recent and remote memory seems intact. Language function is normal with no evidence of aphasia     Cranial nerves:Visual acuity to hand chart -normal; visual fields to confrontation normal;pupils were equal and reactive to light ; funduscopic examination was normal with sharp disc margins. external ocular movements were full with no nystagmus. Facial sensation to pinprick and corneal reflexes intact; Facial muscles were symmetrical. Hearing is unimpaired symmetrical finger rub; Tongue and palate movements were normal with swallowing unimpaired. Shoulder shrug was intact with good strength Speech was normal     Motor examination: Upper and Lower extremities - Normal and symmetrical;muscle tone was normal ; right-handed  Sensory examination:Upper and lower extremities - Pinprick and soft touch were normal and symmetrical. Vibration sense was normal at the toes for age 15-20 seconds  Deep tendon reflexes were 1-2+ symmetrical. Both plantar responses were flexor  Cerebellar examination upper: Normal finger to nose and rapid alternating movements  Gait: Steady with no ataxia; heel and toe  walk normal  Romberg test: negative Tandem gait: Normal   Involuntary movements: Negative  Cervical examination: Full range of motion with no pain Cervical tenderness:negative  Lumbar examination: Low back tenderness-negative Sciatic notch tenderness-negative Straight leg raising test-negative     Impression:Migraine headaches; cervical syndrome; sinus headaches; left l ateral epicondylitis; left cervical radiculopathy  Recommendations/plan:  Continue Fioricet p.r.n. headaches no more than 1-2 times per week.   Follow-up in 6months.

## 2019-01-21 ENCOUNTER — PATIENT MESSAGE (OUTPATIENT)
Dept: FAMILY MEDICINE | Facility: CLINIC | Age: 69
End: 2019-01-21

## 2019-01-23 ENCOUNTER — PATIENT MESSAGE (OUTPATIENT)
Dept: FAMILY MEDICINE | Facility: CLINIC | Age: 69
End: 2019-01-23

## 2019-01-24 ENCOUNTER — LAB VISIT (OUTPATIENT)
Dept: LAB | Facility: HOSPITAL | Age: 69
End: 2019-01-24
Attending: INTERNAL MEDICINE
Payer: MEDICARE

## 2019-01-24 DIAGNOSIS — E03.9 HYPOTHYROIDISM, UNSPECIFIED TYPE: ICD-10-CM

## 2019-01-24 LAB
T4 FREE SERPL-MCNC: 1.05 NG/DL
TSH SERPL DL<=0.005 MIU/L-ACNC: 15.49 UIU/ML

## 2019-01-24 PROCEDURE — 36415 COLL VENOUS BLD VENIPUNCTURE: CPT | Mod: HCNC,PO

## 2019-01-24 PROCEDURE — 84443 ASSAY THYROID STIM HORMONE: CPT | Mod: HCNC

## 2019-01-24 PROCEDURE — 84439 ASSAY OF FREE THYROXINE: CPT | Mod: HCNC

## 2019-01-28 ENCOUNTER — PATIENT MESSAGE (OUTPATIENT)
Dept: FAMILY MEDICINE | Facility: CLINIC | Age: 69
End: 2019-01-28

## 2019-01-28 RX ORDER — LEVOTHYROXINE SODIUM 100 UG/1
100 TABLET ORAL DAILY
Qty: 90 TABLET | Refills: 6 | Status: SHIPPED | OUTPATIENT
Start: 2019-01-28 | End: 2019-04-04

## 2019-01-29 ENCOUNTER — TELEPHONE (OUTPATIENT)
Dept: FAMILY MEDICINE | Facility: CLINIC | Age: 69
End: 2019-01-29

## 2019-01-29 NOTE — TELEPHONE ENCOUNTER
----- Message from Shruthi Odell sent at 1/28/2019 10:39 AM CST -----  Contact: [facility] - Dioen Parham with [facility] says she need additional information to complete patient's PA for DAYNA 5-40 mg per tablet. She need diagnosis and ICD10 code. They would like to know if the patient is chronic taking the non formulary drug. Why does the patient need the drug instead of [facility]'s formulary drug? Please indicate if the following alternatives has been tried   Amlodipine Tablet  Irbesatan tablet   Losatan Tablet  Olmesaratan Tablet  Valsartan Tablet         Human [phone] Ref# [id number]

## 2019-01-29 NOTE — TELEPHONE ENCOUNTER
Spoke with Vilma at [facility]. Dx of Essential Hypertension given & ICD code is I10; verbalized understanding. States she will fax the approval or denial within the next 72 hours.

## 2019-01-31 ENCOUNTER — TELEPHONE (OUTPATIENT)
Dept: FAMILY MEDICINE | Facility: CLINIC | Age: 69
End: 2019-01-31

## 2019-01-31 RX ORDER — OLMESARTAN MEDOXOMIL 40 MG/1
40 TABLET ORAL DAILY
Qty: 90 TABLET | Refills: 3 | Status: SHIPPED | OUTPATIENT
Start: 2019-01-31 | End: 2020-03-11

## 2019-01-31 RX ORDER — AMLODIPINE BESYLATE 5 MG/1
5 TABLET ORAL DAILY
Qty: 90 TABLET | Refills: 11 | Status: SHIPPED | OUTPATIENT
Start: 2019-01-31 | End: 2020-03-11

## 2019-01-31 NOTE — TELEPHONE ENCOUNTER
States [employer] denied the medication. Spoke with Dr. MARIN he states that her medication is a combo drug of two that are on the alternate list. Recommends splitting medications so they are covered. Informed pt; states she is okay with this change.

## 2019-01-31 NOTE — TELEPHONE ENCOUNTER
----- Message from Ivonne Soriano sent at 1/31/2019 10:11 AM CST -----  Contact: pt  Name of Who is Calling: pt      What is the request in detail: pt following up on medication DAYNA 5-40 mg per tablet. Call pt      Can the clinic reply by [facility]: no      [facility] [phone]

## 2019-02-06 ENCOUNTER — OFFICE VISIT (OUTPATIENT)
Dept: OPTOMETRY | Facility: CLINIC | Age: 69
End: 2019-02-06
Payer: MEDICARE

## 2019-02-06 DIAGNOSIS — H52.7 REFRACTIVE ERROR: ICD-10-CM

## 2019-02-06 DIAGNOSIS — H25.13 NUCLEAR SCLEROSIS OF BOTH EYES: ICD-10-CM

## 2019-02-06 DIAGNOSIS — Z01.00 ROUTINE EYE EXAM: Primary | ICD-10-CM

## 2019-02-06 PROCEDURE — 99999 PR PBB SHADOW E&M-EST. PATIENT-LVL I: CPT | Mod: PBBFAC,HCNC,, | Performed by: OPTOMETRIST

## 2019-02-06 PROCEDURE — 92015 DETERMINE REFRACTIVE STATE: CPT | Mod: S$GLB,,, | Performed by: OPTOMETRIST

## 2019-02-06 PROCEDURE — 99999 PR PBB SHADOW E&M-EST. PATIENT-LVL I: ICD-10-PCS | Mod: PBBFAC,HCNC,, | Performed by: OPTOMETRIST

## 2019-02-06 PROCEDURE — 92014 COMPRE OPH EXAM EST PT 1/>: CPT | Mod: S$GLB,,, | Performed by: OPTOMETRIST

## 2019-02-06 PROCEDURE — 92015 PR REFRACTION: ICD-10-PCS | Mod: S$GLB,,, | Performed by: OPTOMETRIST

## 2019-02-06 PROCEDURE — 92014 PR EYE EXAM, EST PATIENT,COMPREHESV: ICD-10-PCS | Mod: S$GLB,,, | Performed by: OPTOMETRIST

## 2019-02-06 RX ORDER — CARVEDILOL 25 MG/1
TABLET ORAL
Qty: 30 TABLET | Refills: 12 | Status: SHIPPED | OUTPATIENT
Start: 2019-02-06 | End: 2020-03-04

## 2019-02-06 NOTE — PROGRESS NOTES
"Subjective:       Patient ID: Aleksandra Santana is a 68 y.o. female      Chief Complaint   Patient presents with    Concerns About Ocular Health     History of Present Illness  Dls: 8/2/17 Dr. Amezcua     67 y/o female presents today for ocular health check.  Pt c/o blurry vision at distance and near ou.   Pt wears single vision glasses for reading    No tearing  + itching  No burning  No pain  + ha's  + floaters  No flashes    Eye meds  None       Assessment/Plan:     1. Routine eye exam  E[facility] vision    2. Refractive error  Educated patient on refractive error and discussed lens options. Dispensed updated spectacle Rx. Educated about adaptation period to new specs.    Eyeglass Final Rx     Eyeglass Final Rx       Sphere Cylinder Axis Add    Right +1.50 +0.75 005 +2.25    Left +1.50 +0.75 150 +2.25    Expiration Date:  2/7/2020                3. Nuclear sclerosis of both eyes  Educated patient on the presence of cataracts and effects on vision, including clouded, blurred or dim vision, increasing difficulty with vision at night, sensitivity to light and glare, need for brighter light for reading and other activities, and seeing "halos" around lights. No surgery at this time. Recheck in one year.    Follow-up in about 1 year (around 2/6/2020).       "

## 2019-02-19 ENCOUNTER — PES CALL (OUTPATIENT)
Dept: ADMINISTRATIVE | Facility: CLINIC | Age: 69
End: 2019-02-19

## 2019-02-25 ENCOUNTER — OFFICE VISIT (OUTPATIENT)
Dept: URGENT CARE | Facility: CLINIC | Age: 69
End: 2019-02-25
Payer: MEDICARE

## 2019-02-25 VITALS
BODY MASS INDEX: 25.32 KG/M2 | DIASTOLIC BLOOD PRESSURE: 90 MMHG | SYSTOLIC BLOOD PRESSURE: 150 MMHG | OXYGEN SATURATION: 97 % | WEIGHT: 134 LBS | TEMPERATURE: 97 F | HEART RATE: 76 BPM

## 2019-02-25 DIAGNOSIS — R52 BODY ACHES: ICD-10-CM

## 2019-02-25 DIAGNOSIS — J06.9 VIRAL URI WITH COUGH: Primary | ICD-10-CM

## 2019-02-25 LAB
CTP QC/QA: YES
FLUAV AG NPH QL: NEGATIVE
FLUBV AG NPH QL: NEGATIVE

## 2019-02-25 PROCEDURE — 87804 POCT INFLUENZA A/B: ICD-10-PCS | Mod: QW,S$GLB,, | Performed by: SURGERY

## 2019-02-25 PROCEDURE — 1100F PTFALLS ASSESS-DOCD GE2>/YR: CPT | Mod: CPTII,S$GLB,, | Performed by: SURGERY

## 2019-02-25 PROCEDURE — 3077F PR MOST RECENT SYSTOLIC BLOOD PRESSURE >= 140 MM HG: ICD-10-PCS | Mod: CPTII,S$GLB,, | Performed by: SURGERY

## 2019-02-25 PROCEDURE — 3288F FALL RISK ASSESSMENT DOCD: CPT | Mod: CPTII,S$GLB,, | Performed by: SURGERY

## 2019-02-25 PROCEDURE — 99214 PR OFFICE/OUTPT VISIT, EST, LEVL IV, 30-39 MIN: ICD-10-PCS | Mod: S$GLB,,, | Performed by: SURGERY

## 2019-02-25 PROCEDURE — 87804 INFLUENZA ASSAY W/OPTIC: CPT | Mod: QW,S$GLB,, | Performed by: SURGERY

## 2019-02-25 PROCEDURE — 3077F SYST BP >= 140 MM HG: CPT | Mod: CPTII,S$GLB,, | Performed by: SURGERY

## 2019-02-25 PROCEDURE — 1100F PR PT FALLS ASSESS DOC 2+ FALLS/FALL W/INJURY/YR: ICD-10-PCS | Mod: CPTII,S$GLB,, | Performed by: SURGERY

## 2019-02-25 PROCEDURE — 3288F PR FALLS RISK ASSESSMENT DOCUMENTED: ICD-10-PCS | Mod: CPTII,S$GLB,, | Performed by: SURGERY

## 2019-02-25 PROCEDURE — 3080F DIAST BP >= 90 MM HG: CPT | Mod: CPTII,S$GLB,, | Performed by: SURGERY

## 2019-02-25 PROCEDURE — 99214 OFFICE O/P EST MOD 30 MIN: CPT | Mod: S$GLB,,, | Performed by: SURGERY

## 2019-02-25 PROCEDURE — 3080F PR MOST RECENT DIASTOLIC BLOOD PRESSURE >= 90 MM HG: ICD-10-PCS | Mod: CPTII,S$GLB,, | Performed by: SURGERY

## 2019-02-25 RX ORDER — BENZONATATE 200 MG/1
200 CAPSULE ORAL 3 TIMES DAILY PRN
Qty: 21 CAPSULE | Refills: 0 | Status: SHIPPED | OUTPATIENT
Start: 2019-02-25 | End: 2019-03-04

## 2019-02-25 RX ORDER — PREDNISONE 20 MG/1
40 TABLET ORAL DAILY
Qty: 10 TABLET | Refills: 0 | Status: SHIPPED | OUTPATIENT
Start: 2019-02-25 | End: 2019-02-25 | Stop reason: SDUPTHER

## 2019-02-25 RX ORDER — BENZONATATE 200 MG/1
200 CAPSULE ORAL 3 TIMES DAILY PRN
Qty: 21 CAPSULE | Refills: 0 | Status: SHIPPED | OUTPATIENT
Start: 2019-02-25 | End: 2019-02-25 | Stop reason: SDUPTHER

## 2019-02-25 RX ORDER — PREDNISONE 20 MG/1
40 TABLET ORAL DAILY
Qty: 10 TABLET | Refills: 0 | Status: SHIPPED | OUTPATIENT
Start: 2019-02-25 | End: 2019-03-02

## 2019-02-25 RX ORDER — PROMETHAZINE HYDROCHLORIDE AND DEXTROMETHORPHAN HYDROBROMIDE 6.25; 15 MG/5ML; MG/5ML
5 SYRUP ORAL EVERY 4 HOURS PRN
Qty: 240 ML | Refills: 0 | Status: SHIPPED | OUTPATIENT
Start: 2019-02-25 | End: 2019-03-04

## 2019-02-26 NOTE — PATIENT INSTRUCTIONS
Viral Upper Respiratory Illness (Adult)  You have a viral upper respiratory illness (URI), which is another term for the common cold. This illness is contagious during the first few days. It is spread through the air by coughing and sneezing. It may also be spread by direct contact (touching the sick person and then touching your own eyes, nose, or mouth). Frequent handwashing will decrease risk of spread. Most viral illnesses go away within 7 to 10 days with rest and simple home remedies. Sometimes the illness may last for several weeks. Antibiotics will not kill a virus, and they are generally not prescribed for this condition.    Home care  · If symptoms are severe, rest at home for the first 2 to 3 days. When you resume activity, don't let yourself get too tired.  · Avoid being exposed to cigarette smoke (yours or others).  · You may use acetaminophen or ibuprofen to control pain and fever, unless another medicine was prescribed. (Note: If you have chronic liver or kidney disease, have ever had a stomach ulcer or gastrointestinal bleeding, or are taking blood-thinning medicines, talk with your healthcare provider before using these medicines.) Aspirin should never be given to anyone under 18 years of age who is ill with a viral infection or fever. It may cause severe liver or brain damage.  · Your appetite may be poor, so a light diet is fine. Avoid dehydration by drinking 6 to 8 glasses of fluids per day (water, soft drinks, juices, tea, or soup). Extra fluids will help loosen secretions in the nose and lungs.  · Over-the-counter cold medicines will not shorten the length of time youre sick, but they may be helpful for the following symptoms: cough, sore throat, and nasal and sinus congestion. (Note: Do not use decongestants if you have high blood pressure.)  Follow-up care  Follow up with your healthcare provider, or as advised.  When to seek medical advice  Call your healthcare provider right away if any  of these occur:  · Cough with lots of colored sputum (mucus)  · Severe headache; face, neck, or ear pain  · Difficulty swallowing due to throat pain  · Fever of 100.4°F (38°C)  Call 911, or get immediate medical care  Call emergency services right away if any of these occur:  · Chest pain, shortness of breath, wheezing, or difficulty breathing  · Coughing up blood  · Inability to swallow due to throat pain  Date Last Reviewed: 9/13/2015  © 2159-5006 [employer]. [address] [place] [address]. All rights reserved. This information is not intended as a substitute for professional medical care. Always follow your healthcare professional's instructions.

## 2019-02-26 NOTE — PROGRESS NOTES
Subjective:       Patient ID: Aleksandra Santana is a 68 y.o. female.    Vitals:  weight is 60.8 kg (134 lb). Her temperature is 97.1 °F (36.2 °C). Her blood pressure is 150/90 (abnormal) and her pulse is 76. Her oxygen saturation is 97%.     Chief Complaint: URI    Pt reports having a cough and sinus congestion and pressure, sneezing        URI    This is a new problem. The current episode started in the past 7 days. There has been no fever. Associated symptoms include congestion, coughing, sinus pain and sneezing. Pertinent negatives include no ear pain, nausea, rash, sore throat, vomiting or wheezing. She has tried acetaminophen (xyzal, sudafed) for the symptoms. The treatment provided mild relief.       Constitution: Negative for chills, sweating, fatigue and fever.   HENT: Positive for congestion, postnasal drip, sinus pain and sinus pressure. Negative for ear pain, sore throat and voice change.    Neck: Negative for painful lymph nodes.   Eyes: Negative for eye redness.   Respiratory: Positive for cough and sputum production. Negative for chest tightness, bloody sputum, COPD, shortness of breath, stridor, wheezing and asthma.    Gastrointestinal: Negative for nausea and vomiting.   Musculoskeletal: Negative for muscle ache.   Skin: Negative for rash.   Allergic/Immunologic: Positive for sneezing. Negative for seasonal allergies and asthma.   Hematologic/Lymphatic: Negative for swollen lymph nodes.       Objective:      Physical Exam   Constitutional: She is oriented to person, place, and time. She appears well-developed and well-nourished. She is cooperative.  Non-toxic appearance. She does not appear ill. No distress.   HENT:   Head: Normocephalic and atraumatic.   Right Ear: Hearing, tympanic membrane, external ear and ear canal normal.   Left Ear: Hearing, tympanic membrane, external ear and ear canal normal.   Nose: Mucosal edema and rhinorrhea present. No nasal deformity. No epistaxis. Right sinus exhibits no  maxillary sinus tenderness and no frontal sinus tenderness. Left sinus exhibits no maxillary sinus tenderness and no frontal sinus tenderness.   Mouth/Throat: Uvula is midline and mucous membranes are normal. No trismus in the jaw. Normal dentition. No uvula swelling. Posterior oropharyngeal edema present. No posterior oropharyngeal erythema.   Eyes: Conjunctivae and lids are normal. No scleral icterus.   Sclera clear bilat   Neck: Trachea normal, full passive range of motion without pain and phonation normal. Neck supple.   Cardiovascular: Normal rate, regular rhythm, normal heart sounds, intact distal pulses and normal pulses.   Pulmonary/Chest: Effort normal and breath sounds normal. No respiratory distress.   Abdominal: Soft. Normal appearance and bowel sounds are normal. She exhibits no distension. There is no tenderness.   Musculoskeletal: Normal range of motion. She exhibits no edema or deformity.   Neurological: She is alert and oriented to person, place, and time. She exhibits normal muscle tone. Coordination normal.   Skin: Skin is warm, dry and intact. She is not diaphoretic. No pallor.   Psychiatric: She has a normal mood and affect. Her speech is normal and behavior is normal. Judgment and thought content normal. Cognition and memory are normal.   Nursing note and vitals reviewed.      Assessment:       1. Viral URI with cough    2. Body aches        Plan:         Viral URI with cough  -     Discontinue: predniSONE (DELTASONE) 20 MG tablet; Take 2 tablets (40 mg total) by mouth once daily. for 5 days  Dispense: 10 tablet; Refill: 0  -     Discontinue: benzonatate (TESSALON) 200 MG capsule; Take 1 capsule (200 mg total) by mouth 3 (three) times daily as needed for Cough.  Dispense: 21 capsule; Refill: 0  -     promethazine-dextromethorphan (PROMETHAZINE-DM) 6.25-15 mg/5 mL Syrp; Take 5 mLs by mouth every 4 (four) hours as needed.  Dispense: 240 mL; Refill: 0  -     predniSONE (DELTASONE) 20 MG tablet;  Take 2 tablets (40 mg total) by mouth once daily. for 5 days  Dispense: 10 tablet; Refill: 0  -     benzonatate (TESSALON) 200 MG capsule; Take 1 capsule (200 mg total) by mouth 3 (three) times daily as needed for Cough.  Dispense: 21 capsule; Refill: 0    Body aches  -     POCT Influenza A/B      Results for orders placed or performed in visit on 02/25/19   POCT Influenza A/B   Result Value Ref Range    Rapid Influenza A Ag Negative Negative    Rapid Influenza B Ag Negative Negative     Acceptable Yes

## 2019-03-01 ENCOUNTER — PATIENT MESSAGE (OUTPATIENT)
Dept: FAMILY MEDICINE | Facility: CLINIC | Age: 69
End: 2019-03-01

## 2019-03-03 ENCOUNTER — HOSPITAL ENCOUNTER (EMERGENCY)
Facility: HOSPITAL | Age: 69
Discharge: HOME | End: 2019-03-03
Attending: INTERNAL MEDICINE
Payer: MEDICARE

## 2019-03-03 VITALS
DIASTOLIC BLOOD PRESSURE: 50 MMHG | HEIGHT: 61 IN | WEIGHT: 128 LBS | HEART RATE: 77 BPM | BODY MASS INDEX: 24.17 KG/M2 | TEMPERATURE: 98 F | SYSTOLIC BLOOD PRESSURE: 131 MMHG | OXYGEN SATURATION: 100 % | RESPIRATION RATE: 18 BRPM

## 2019-03-03 DIAGNOSIS — J06.9 ACUTE URI: Primary | ICD-10-CM

## 2019-03-03 PROCEDURE — 99284 EMERGENCY DEPT VISIT MOD MDM: CPT | Mod: HCNC,ER

## 2019-03-03 RX ORDER — AZELASTINE 1 MG/ML
2 SPRAY, METERED NASAL 2 TIMES DAILY
Qty: 30 ML | Refills: 0 | Status: SHIPPED | OUTPATIENT
Start: 2019-03-03 | End: 2019-08-12 | Stop reason: SDUPTHER

## 2019-03-03 RX ORDER — FLUTICASONE PROPIONATE 50 MCG
2 SPRAY, SUSPENSION (ML) NASAL DAILY
Qty: 15 G | Refills: 0 | Status: SHIPPED | OUTPATIENT
Start: 2019-03-03 | End: 2019-09-11 | Stop reason: SDUPTHER

## 2019-03-04 ENCOUNTER — TELEPHONE (OUTPATIENT)
Dept: OTOLARYNGOLOGY | Facility: CLINIC | Age: 69
End: 2019-03-04

## 2019-03-04 DIAGNOSIS — J32.9 SINUSITIS, UNSPECIFIED CHRONICITY, UNSPECIFIED LOCATION: Primary | ICD-10-CM

## 2019-03-04 RX ORDER — AMOXICILLIN AND CLAVULANATE POTASSIUM 875; 125 MG/1; MG/1
1 TABLET, FILM COATED ORAL EVERY 12 HOURS
Qty: 20 TABLET | Refills: 0 | Status: SHIPPED | OUTPATIENT
Start: 2019-03-04 | End: 2019-03-14

## 2019-03-04 NOTE — ED PROVIDER NOTES
"Encounter Date: 3/3/2019    SCRIBE #1 NOTE: I, Chris Pradhan, am scribing for, and in the presence of,  Dr. Maxwell. I have scribed the following portions of the note - Other sections scribed: HPI, ROS, PE.       History     Chief Complaint   Patient presents with    green nasal discharge and head pain     pt c/o green nasal discharge and head pain starting x1 week ago, seen in urgent care monday past given rx for prednisone and teaslon pearls with no relieve,      This is a 68 y.o. female who presents to the ED with a complaint of rhinorrhea with green sputum that began one week ago.  Pt endorses a subjective fever, nasal congestion, sore throat, post nasal drip, and HA from the bridge of her nose to the back of her neck.  She describes her pain as a "pounding" sensation.  She was seen at urgent care six days ago and prescribed prednisone and tessalon pearls without relief.  Pt states her symptoms have been gradually improving since her visit to urgent care.  She denies SOB, CP, nausea, emesis, diarrhea, or abdominal pain.      The history is provided by the patient.     Review of patient's allergies indicates:   Allergen Reactions    Codeine     Iodine and iodide containing products     Iodinated contrast- oral and iv dye      Rash (skin)^  Rash (skin)^    Mobic [meloxicam]     Doxycycline Rash     Past Medical History:   Diagnosis Date    Abnormal EKG     Allergy     seasonal    Anxiety     Breast cyst     Cataract     Fibrocystic breast     GERD (gastroesophageal reflux disease)     History of colonic polyps     1 polyp  --5 yrs.    Hypertension     Hypothyroidism     IBS (irritable bowel syndrome)     Keloid cicatrix     Migraine syndrome     OA (osteoarthritis)     Osteopenia     BMD  --no tx -repeat 2 yrs    Osteoporosis, postmenopausal     Palpitations     SOB (shortness of breath)      Past Surgical History:   Procedure Laterality Date    APPENDECTOMY       SECTION  "     x2    COLONOSCOPY N/A 11/10/2014    Performed by [facility] (4TH FLR)    ESOPHAGOGASTRODUODENOSCOPY (EGD) N/A 11/10/2014    Performed by [facility] at [facility] (4TH FLR)    HYSTERECTOMY  age 33    COLEEN/BSO for endometriosis    OOPHORECTOMY      TONSILLECTOMY       Family History   Problem Relation Age of Onset    Cataracts Mother     No Known Problems Father     Ovarian cancer Sister     No Known Problems Brother     No Known Problems Maternal Aunt     No Known Problems Maternal Uncle     No Known Problems Paternal Aunt     No Known Problems Paternal Uncle     No Known Problems Maternal Grandmother     No Known Problems Maternal Grandfather     No Known Problems Paternal Grandmother     No Known Problems Paternal Grandfather     Colon cancer Neg Hx     Breast cancer Neg Hx     Amblyopia Neg Hx     Blindness Neg Hx     Cancer Neg Hx     Diabetes Neg Hx     Glaucoma Neg Hx     Hypertension Neg Hx     Macular degeneration Neg Hx     Retinal detachment Neg Hx     Strabismus Neg Hx     Stroke Neg Hx     Thyroid disease Neg Hx     Melanoma Neg Hx      Social History     Tobacco Use    Smoking status: Never Smoker    Smokeless tobacco: Never Used   Substance Use Topics    Alcohol use: Yes     Alcohol/week: 0.6 oz     Types: 1 Glasses of wine per week     Comment: ocasionally    Drug use: No     Review of Systems   Constitutional: Positive for fever (Subjective).   HENT: Positive for congestion, postnasal drip, rhinorrhea and sore throat.    Respiratory: Negative for shortness of breath.    Cardiovascular: Negative for chest pain.   Gastrointestinal: Negative for abdominal pain, diarrhea, nausea and vomiting.   Musculoskeletal: Positive for neck pain.   Neurological: Positive for headaches.   All other systems reviewed and are negative.      Physical Exam     Initial Vitals [03/03/19 1953]   BP Pulse Resp Temp SpO2   (!) 133/47 75 18 97.5 °F (36.4 °C)  "100 %      MAP       --         Physical Exam    Nursing note and vitals reviewed.  Constitutional: She appears well-developed and well-nourished.   HENT:   Head: Normocephalic and atraumatic.   Right Ear: Tympanic membrane and external ear normal.   Left Ear: External ear normal.   Nose: Mucosal edema and rhinorrhea present.   Mouth/Throat: Uvula is midline. Posterior oropharyngeal erythema present. No oropharyngeal exudate or posterior oropharyngeal edema.   Post nasal drip present.  Excess cerumen to the left EAC.    Eyes: Conjunctivae are normal.   Neck: Normal range of motion. Neck supple.   Cardiovascular: Normal rate, regular rhythm, normal heart sounds and intact distal pulses. Exam reveals no gallop and no friction rub.    No murmur heard.  Pulmonary/Chest: Effort normal and breath sounds normal. No respiratory distress. She has no wheezes. She has no rhonchi. She has no rales. She exhibits no tenderness.   Abdominal: Soft. Bowel sounds are normal. She exhibits no distension and no mass. There is no tenderness. There is no rebound and no guarding.   Musculoskeletal: Normal range of motion.   Neurological: She is alert and oriented to person, place, and time.   Skin: Skin is warm and dry. Capillary refill takes less than 2 seconds.   Psychiatric: She has a normal mood and affect.         ED Course   Procedures  Labs Reviewed - No data to display       Imaging Results    None          Medical Decision Making:   Initial Assessment:   This is a 68 y.o. female who presents to the ED with a complaint of rhinorrhea with green sputum that began one week ago.  Pt endorses a subjective fever, nasal congestion, sore throat, post nasal drip, and HA from the bridge of her nose to the back of her neck.  She describes her pain as a "pounding" sensation.  She was seen at urgent care six days ago and prescribed prednisone and tessalon pearls without relief.  Pt states her symptoms have been gradually improving since her " visit to urgent care.  She denies SOB, CP, nausea, emesis, diarrhea, or abdominal pain.   ED Management:  8:24 PM:  Repeat BP reading during the pt's physical examination was 165/74.              Scribe Attestation:   Scribe #1: I performed the above scribed service and the documentation accurately describes the services I performed. I attest to the accuracy of the note.    This document was produced by a scribe under my direction and in my presence. I agree with the content of the note and have made any necessary edits.     Dr. Maxwell    03/04/2019 2:58 AM             Clinical Impression:     1. Acute URI           Disposition:   Disposition: Discharged  Condition: Stable                        Sergio Maxwell MD  03/04/19 0258

## 2019-03-04 NOTE — TELEPHONE ENCOUNTER
Patient states that she has been sick for 1 1/2 weeks now. Blowing a dark green color, lots of nasal congestion with pressure behind her eyes. Started to make her feel off balance from all the congestion. Patient is using Astelin and Flonase Nasal [place]. Patient states she was seen in the ER yesterday 3/3/19 for the same symptoms but was not given any antibiotics for the infection. Also was seen in urgent care on 2/25/19, both documented in the [facility] system.

## 2019-03-04 NOTE — TELEPHONE ENCOUNTER
----- Message from Iraj Ly sent at 3/4/2019  9:31 AM CST -----  Contact: Self/[phone]  The patient would like a RX for a sinus infection sent over to her pharmacy.        [facility] [place] [facility] [phone] (Phone)  [phone] (Fax)      Thank you

## 2019-03-15 RX ORDER — ESTROGENS, CONJUGATED 1.25 MG
TABLET ORAL
Qty: 30 TABLET | Refills: 3 | Status: SHIPPED | OUTPATIENT
Start: 2019-03-15 | End: 2020-02-16

## 2019-03-28 ENCOUNTER — PATIENT MESSAGE (OUTPATIENT)
Dept: FAMILY MEDICINE | Facility: CLINIC | Age: 69
End: 2019-03-28

## 2019-03-28 DIAGNOSIS — E03.9 HYPOTHYROIDISM, UNSPECIFIED TYPE: Primary | ICD-10-CM

## 2019-03-31 ENCOUNTER — PATIENT MESSAGE (OUTPATIENT)
Dept: FAMILY MEDICINE | Facility: CLINIC | Age: 69
End: 2019-03-31

## 2019-04-02 ENCOUNTER — LAB VISIT (OUTPATIENT)
Dept: LAB | Facility: HOSPITAL | Age: 69
End: 2019-04-02
Attending: INTERNAL MEDICINE
Payer: MEDICARE

## 2019-04-02 DIAGNOSIS — E03.9 HYPOTHYROIDISM, UNSPECIFIED TYPE: ICD-10-CM

## 2019-04-02 LAB
T4 FREE SERPL-MCNC: 1.38 NG/DL (ref 0.71–1.51)
T4 SERPL-MCNC: 13.3 UG/DL (ref 4.5–11.5)
TSH SERPL DL<=0.005 MIU/L-ACNC: 0.03 UIU/ML (ref 0.4–4)

## 2019-04-02 PROCEDURE — 84436 ASSAY OF TOTAL THYROXINE: CPT | Mod: HCNC

## 2019-04-02 PROCEDURE — 36415 COLL VENOUS BLD VENIPUNCTURE: CPT | Mod: HCNC,PO

## 2019-04-02 PROCEDURE — 84443 ASSAY THYROID STIM HORMONE: CPT | Mod: HCNC

## 2019-04-02 PROCEDURE — 84439 ASSAY OF FREE THYROXINE: CPT | Mod: HCNC

## 2019-04-03 DIAGNOSIS — E03.9 HYPOTHYROIDISM, UNSPECIFIED TYPE: Primary | ICD-10-CM

## 2019-04-03 RX ORDER — LEVOTHYROXINE SODIUM 88 UG/1
88 TABLET ORAL DAILY
Qty: 30 TABLET | Refills: 6 | Status: SHIPPED | OUTPATIENT
Start: 2019-04-03 | End: 2019-12-14 | Stop reason: SDUPTHER

## 2019-04-03 NOTE — PROGRESS NOTES
Total T4 has significantly increased and the TSH has significantly decreased. We will decrease the 100 mcg dose to 88 mcg dose.     Will rule out that the patient is not taking the old 75 mcg Rx and the 100 mcg Rx together.

## 2019-04-08 RX ORDER — FOLIC ACID 1 MG/1
TABLET ORAL
Qty: 90 TABLET | Refills: 12 | Status: SHIPPED | OUTPATIENT
Start: 2019-04-08 | End: 2020-05-11

## 2019-04-22 RX ORDER — HYDROCHLOROTHIAZIDE 25 MG/1
TABLET ORAL
Qty: 90 TABLET | Refills: 0 | Status: SHIPPED | OUTPATIENT
Start: 2019-04-22 | End: 2019-11-04 | Stop reason: SDUPTHER

## 2019-05-20 ENCOUNTER — PES CALL (OUTPATIENT)
Dept: ADMINISTRATIVE | Facility: CLINIC | Age: 69
End: 2019-05-20

## 2019-06-10 ENCOUNTER — PATIENT MESSAGE (OUTPATIENT)
Dept: FAMILY MEDICINE | Facility: CLINIC | Age: 69
End: 2019-06-10

## 2019-06-10 DIAGNOSIS — E03.9 HYPOTHYROIDISM, UNSPECIFIED TYPE: Primary | ICD-10-CM

## 2019-06-12 ENCOUNTER — LAB VISIT (OUTPATIENT)
Dept: LAB | Facility: HOSPITAL | Age: 69
End: 2019-06-12
Attending: INTERNAL MEDICINE
Payer: MEDICARE

## 2019-06-12 DIAGNOSIS — E03.9 HYPOTHYROIDISM, UNSPECIFIED TYPE: ICD-10-CM

## 2019-06-12 PROCEDURE — 84439 ASSAY OF FREE THYROXINE: CPT | Mod: HCNC

## 2019-06-12 PROCEDURE — 84443 ASSAY THYROID STIM HORMONE: CPT | Mod: HCNC

## 2019-06-12 PROCEDURE — 36415 COLL VENOUS BLD VENIPUNCTURE: CPT | Mod: HCNC,PO

## 2019-06-13 LAB
T4 FREE SERPL-MCNC: 1.37 NG/DL (ref 0.71–1.51)
TSH SERPL DL<=0.005 MIU/L-ACNC: 0.13 UIU/ML (ref 0.4–4)

## 2019-06-14 ENCOUNTER — PATIENT MESSAGE (OUTPATIENT)
Dept: FAMILY MEDICINE | Facility: CLINIC | Age: 69
End: 2019-06-14

## 2019-07-11 RX ORDER — CELECOXIB 200 MG/1
CAPSULE ORAL
Qty: 60 CAPSULE | Refills: 1 | Status: SHIPPED | OUTPATIENT
Start: 2019-07-11 | End: 2020-02-17

## 2019-07-28 ENCOUNTER — OFFICE VISIT (OUTPATIENT)
Dept: URGENT CARE | Facility: CLINIC | Age: 69
End: 2019-07-28
Payer: MEDICARE

## 2019-07-28 VITALS
HEIGHT: 61 IN | OXYGEN SATURATION: 98 % | TEMPERATURE: 97 F | RESPIRATION RATE: 16 BRPM | SYSTOLIC BLOOD PRESSURE: 120 MMHG | BODY MASS INDEX: 23.98 KG/M2 | DIASTOLIC BLOOD PRESSURE: 62 MMHG | WEIGHT: 127 LBS | HEART RATE: 63 BPM

## 2019-07-28 DIAGNOSIS — H60.12 CELLULITIS OF LEFT EAR: Primary | ICD-10-CM

## 2019-07-28 PROCEDURE — 3078F PR MOST RECENT DIASTOLIC BLOOD PRESSURE < 80 MM HG: ICD-10-PCS | Mod: CPTII,S$GLB,, | Performed by: PHYSICIAN ASSISTANT

## 2019-07-28 PROCEDURE — 3288F PR FALLS RISK ASSESSMENT DOCUMENTED: ICD-10-PCS | Mod: CPTII,S$GLB,, | Performed by: PHYSICIAN ASSISTANT

## 2019-07-28 PROCEDURE — 3074F SYST BP LT 130 MM HG: CPT | Mod: CPTII,S$GLB,, | Performed by: PHYSICIAN ASSISTANT

## 2019-07-28 PROCEDURE — 3078F DIAST BP <80 MM HG: CPT | Mod: CPTII,S$GLB,, | Performed by: PHYSICIAN ASSISTANT

## 2019-07-28 PROCEDURE — 1100F PR PT FALLS ASSESS DOC 2+ FALLS/FALL W/INJURY/YR: ICD-10-PCS | Mod: CPTII,S$GLB,, | Performed by: PHYSICIAN ASSISTANT

## 2019-07-28 PROCEDURE — 96372 THER/PROPH/DIAG INJ SC/IM: CPT | Mod: S$GLB,,, | Performed by: PHYSICIAN ASSISTANT

## 2019-07-28 PROCEDURE — 1100F PTFALLS ASSESS-DOCD GE2>/YR: CPT | Mod: CPTII,S$GLB,, | Performed by: PHYSICIAN ASSISTANT

## 2019-07-28 PROCEDURE — 99214 OFFICE O/P EST MOD 30 MIN: CPT | Mod: 25,S$GLB,, | Performed by: PHYSICIAN ASSISTANT

## 2019-07-28 PROCEDURE — 3074F PR MOST RECENT SYSTOLIC BLOOD PRESSURE < 130 MM HG: ICD-10-PCS | Mod: CPTII,S$GLB,, | Performed by: PHYSICIAN ASSISTANT

## 2019-07-28 PROCEDURE — 96372 PR INJECTION,THERAP/PROPH/DIAG2ST, IM OR SUBCUT: ICD-10-PCS | Mod: S$GLB,,, | Performed by: PHYSICIAN ASSISTANT

## 2019-07-28 PROCEDURE — 3288F FALL RISK ASSESSMENT DOCD: CPT | Mod: CPTII,S$GLB,, | Performed by: PHYSICIAN ASSISTANT

## 2019-07-28 PROCEDURE — 99214 PR OFFICE/OUTPT VISIT, EST, LEVL IV, 30-39 MIN: ICD-10-PCS | Mod: 25,S$GLB,, | Performed by: PHYSICIAN ASSISTANT

## 2019-07-28 RX ORDER — CEPHALEXIN 500 MG/1
500 CAPSULE ORAL EVERY 8 HOURS
Qty: 30 CAPSULE | Refills: 0 | Status: SHIPPED | OUTPATIENT
Start: 2019-07-28 | End: 2019-07-28

## 2019-07-28 RX ORDER — BETAMETHASONE SODIUM PHOSPHATE AND BETAMETHASONE ACETATE 3; 3 MG/ML; MG/ML
6 INJECTION, SUSPENSION INTRA-ARTICULAR; INTRALESIONAL; INTRAMUSCULAR; SOFT TISSUE
Status: COMPLETED | OUTPATIENT
Start: 2019-07-28 | End: 2019-07-28

## 2019-07-28 RX ORDER — CEPHALEXIN 500 MG/1
500 CAPSULE ORAL EVERY 8 HOURS
Qty: 30 CAPSULE | Refills: 0 | Status: SHIPPED | OUTPATIENT
Start: 2019-07-28 | End: 2019-08-07

## 2019-07-28 RX ADMIN — BETAMETHASONE SODIUM PHOSPHATE AND BETAMETHASONE ACETATE 6 MG: 3; 3 INJECTION, SUSPENSION INTRA-ARTICULAR; INTRALESIONAL; INTRAMUSCULAR; SOFT TISSUE at 05:07

## 2019-07-28 NOTE — PROGRESS NOTES
"Subjective:       Patient ID: Aleksandra Santana is a 69 y.o. female.    Vitals:  height is 5' 1" (1.549 m) and weight is 57.6 kg (127 lb). Her temperature is 96.8 °F (36 °C). Her blood pressure is 120/62 and her pulse is 63. Her respiration is 16 and oxygen saturation is 98%.     Chief Complaint: Otalgia    Pt states she is having left ear pain that radiates to her neck and jaw. States that she saw her ENT last year and was told she had an infected hair follicle in her ear. States that this is similar and she can feel the lump inside her ear canal. States that she tried to treat it with leftover keflex and augmentin but it didn't help.    Otalgia    There is pain in the left ear. Episode onset: 2 days  The problem occurs constantly. The problem has been gradually worsening. There has been no fever. The pain is at a severity of 5/10. The pain is mild. Associated symptoms include neck pain. Pertinent negatives include no coughing, diarrhea, headaches, rash, sore throat or vomiting. She has tried antibiotics for the symptoms. The treatment provided no relief.       Constitution: Negative for chills, fatigue and fever.   HENT: Positive for ear pain. Negative for congestion and sore throat.    Neck: Positive for neck pain. Negative for painful lymph nodes.   Cardiovascular: Negative for chest pain and leg swelling.   Eyes: Negative for double vision and blurred vision.   Respiratory: Negative for cough and shortness of breath.    Gastrointestinal: Negative for nausea, vomiting and diarrhea.   Genitourinary: Negative for dysuria, frequency, urgency and history of kidney stones.   Musculoskeletal: Negative for joint pain, joint swelling, muscle cramps and muscle ache.   Skin: Negative for color change, pale, rash and bruising.   Allergic/Immunologic: Negative for seasonal allergies.   Neurological: Negative for dizziness, history of vertigo, light-headedness, passing out and headaches.   Hematologic/Lymphatic: Negative for " swollen lymph nodes.   Psychiatric/Behavioral: Negative for nervous/anxious, sleep disturbance and depression. The patient is not nervous/anxious.        Objective:      Physical Exam   Constitutional: She is oriented to person, place, and time. She appears well-developed and well-nourished. She is cooperative.  Non-toxic appearance. She does not appear ill. No distress.   HENT:   Head: Normocephalic and atraumatic.   Right Ear: Hearing, tympanic membrane, external ear and ear canal normal.   Left Ear: Tympanic membrane, external ear and ear canal normal. There is swelling and tenderness. Decreased hearing is noted.   Ears:    Nose: Nose normal. No mucosal edema, rhinorrhea or nasal deformity. No epistaxis. Right sinus exhibits no maxillary sinus tenderness and no frontal sinus tenderness. Left sinus exhibits no maxillary sinus tenderness and no frontal sinus tenderness.   Mouth/Throat: Uvula is midline, oropharynx is clear and moist and mucous membranes are normal. No trismus in the jaw. Normal dentition. No uvula swelling. No posterior oropharyngeal erythema.   Eyes: Conjunctivae and lids are normal. Right eye exhibits no discharge. Left eye exhibits no discharge. No scleral icterus.   Sclera clear bilat   Neck: Trachea normal, normal range of motion, full passive range of motion without pain and phonation normal. Neck supple.   Cardiovascular: Normal rate, regular rhythm, normal heart sounds, intact distal pulses and normal pulses.   Pulmonary/Chest: Effort normal and breath sounds normal. No respiratory distress.   Abdominal: Soft. Normal appearance and bowel sounds are normal. She exhibits no distension, no pulsatile midline mass and no mass. There is no tenderness.   Musculoskeletal: Normal range of motion. She exhibits no edema or deformity.   Neurological: She is alert and oriented to person, place, and time. She exhibits normal muscle tone. Coordination normal.   Skin: Skin is warm, dry and intact. She is  not diaphoretic. No pallor.   Psychiatric: She has a normal mood and affect. Her speech is normal and behavior is normal. Judgment and thought content normal. Cognition and memory are normal.   Nursing note and vitals reviewed.        Assessment:       1. Cellulitis of left ear        Plan:         Cellulitis of left ear  -     betamethasone acetate-betamethasone sodium phosphate injection 6 mg  -     Discontinue: cephALEXin (KEFLEX) 500 MG capsule; Take 1 capsule (500 mg total) by mouth every 8 (eight) hours. for 10 days  Dispense: 30 capsule; Refill: 0  -     cephALEXin (KEFLEX) 500 MG capsule; Take 1 capsule (500 mg total) by mouth every 8 (eight) hours. for 10 days  Dispense: 30 capsule; Refill: 0      Patient Instructions   General Discharge Instructions   If you were prescribed a narcotic or controlled medication, do not drive or operate heavy equipment or machinery while taking these medications.  If you were prescribed antibiotics, please take them to completion.  You must understand that you've received an Urgent Care treatment only and that you may be released before all your medical problems are known or treated. You, the patient, will arrange for follow up care as instructed.  Follow up with your PCP or specialty clinic as directed in the next 1-2 weeks if not improved or as needed.  You can call [phone] to schedule an appointment with the appropriate provider.  If your condition worsens we recommend that you receive another evaluation at the emergency room immediately or contact your primary medical clinics after hours call service to discuss your concerns.  Please return here or go to the Emergency Department for any concerns or worsening of condition.      Cellulitis  Cellulitis is an infection of the deep layers of skin. A break in the skin, such as a cut or scratch, can let bacteria under the skin. If the bacteria get to deep layers of the skin, it can be serious. If not treated, cellulitis  can get into the bloodstream and lymph nodes. The infection can then spread throughout the body. This causes serious illness.  Cellulitis causes the affected skin to become red, swollen, warm, and sore. The reddened areas have a visible border. An open sore may leak fluid (pus). You may have a fever, chills, and pain.  Cellulitis is treated with antibiotics taken for 7 to 10 days. An open sore may be cleaned and covered with cool wet gauze. Symptoms should get better 1 to 2 days after treatment is started. Make sure to take all the antibiotics for the full number of days until they are gone. Keep taking the medicine even if your symptoms go away.  Home care  Follow these tips:  · Limit the use of the part of your body with cellulitis.   · If the infection is on your leg, keep your leg raised while sitting. This will help to reduce swelling.  · Take all of the antibiotic medicine exactly as directed until it is gone. Do not miss any doses, especially during the first 7 days. Dont stop taking the medicine when your symptoms get better.  · Keep the affected area clean and dry.  · Wash your hands with soap and warm water before and after touching your skin. Anyone else who touches your skin should also wash his or her hands. Don't share towels.  Follow-up care  Follow up with your healthcare provider, or as advised. If your infection does not go away on the first antibiotic, your healthcare provider will prescribe a different one.  When to seek medical advice  Call your healthcare provider right away if any of these occur:  · Red areas that spread  · Swelling or pain that gets worse  · Fluid leaking from the skin (pus)  · Fever higher of 100.4º F (38.0º C) or higher after 2 days on antibiotics  Date Last Reviewed: 9/1/2016  © 8068-0569 [employer]. [address] [place] [address], [place] [address]. All rights reserved. This information is not intended as a substitute for professional medical care. Always follow  your healthcare professional's instructions.

## 2019-07-28 NOTE — PATIENT INSTRUCTIONS
General Discharge Instructions   If you were prescribed a narcotic or controlled medication, do not drive or operate heavy equipment or machinery while taking these medications.  If you were prescribed antibiotics, please take them to completion.  You must understand that you've received an Urgent Care treatment only and that you may be released before all your medical problems are known or treated. You, the patient, will arrange for follow up care as instructed.  Follow up with your PCP or specialty clinic as directed in the next 1-2 weeks if not improved or as needed.  You can call [phone] to schedule an appointment with the appropriate provider.  If your condition worsens we recommend that you receive another evaluation at the emergency room immediately or contact your primary medical clinics after hours call service to discuss your concerns.  Please return here or go to the Emergency Department for any concerns or worsening of condition.      Cellulitis  Cellulitis is an infection of the deep layers of skin. A break in the skin, such as a cut or scratch, can let bacteria under the skin. If the bacteria get to deep layers of the skin, it can be serious. If not treated, cellulitis can get into the bloodstream and lymph nodes. The infection can then spread throughout the body. This causes serious illness.  Cellulitis causes the affected skin to become red, swollen, warm, and sore. The reddened areas have a visible border. An open sore may leak fluid (pus). You may have a fever, chills, and pain.  Cellulitis is treated with antibiotics taken for 7 to 10 days. An open sore may be cleaned and covered with cool wet gauze. Symptoms should get better 1 to 2 days after treatment is started. Make sure to take all the antibiotics for the full number of days until they are gone. Keep taking the medicine even if your symptoms go away.  Home care  Follow these tips:  · Limit the use of the part of your body with  cellulitis.   · If the infection is on your leg, keep your leg raised while sitting. This will help to reduce swelling.  · Take all of the antibiotic medicine exactly as directed until it is gone. Do not miss any doses, especially during the first 7 days. Dont stop taking the medicine when your symptoms get better.  · Keep the affected area clean and dry.  · Wash your hands with soap and warm water before and after touching your skin. Anyone else who touches your skin should also wash his or her hands. Don't share towels.  Follow-up care  Follow up with your healthcare provider, or as advised. If your infection does not go away on the first antibiotic, your healthcare provider will prescribe a different one.  When to seek medical advice  Call your healthcare provider right away if any of these occur:  · Red areas that spread  · Swelling or pain that gets worse  · Fluid leaking from the skin (pus)  · Fever higher of 100.4º F (38.0º C) or higher after 2 days on antibiotics  Date Last Reviewed: 9/1/2016  © 0431-9766 [employer]. [address] [place] [address], [place] [address]. All rights reserved. This information is not intended as a substitute for professional medical care. Always follow your healthcare professional's instructions.

## 2019-08-02 ENCOUNTER — TELEPHONE (OUTPATIENT)
Dept: NEUROLOGY | Facility: CLINIC | Age: 69
End: 2019-08-02

## 2019-08-02 NOTE — TELEPHONE ENCOUNTER
----- Message from Rosie Valdes sent at 8/2/2019  4:50 PM CDT -----  Contact: pt @ [phone]  Calling to schedule an appt with Dr. Carrington per the recall letter. Please call.

## 2019-08-06 RX ORDER — BUTALBITAL, ACETAMINOPHEN AND CAFFEINE 50; 325; 40 MG/1; MG/1; MG/1
1 TABLET ORAL EVERY 6 HOURS PRN
Qty: 30 TABLET | Refills: 0 | Status: SHIPPED | OUTPATIENT
Start: 2019-08-06 | End: 2019-09-12 | Stop reason: SDUPTHER

## 2019-08-06 NOTE — TELEPHONE ENCOUNTER
----- Message from Letitia Ngo sent at 8/6/2019  1:00 PM CDT -----  Contact: self @ [phone]  Pt is req a refill for butalbital-acetaminophen-caffeine -40 mg (FIORICET, ESGIC) -40 mg per tablet.    [facility] - [place] [facility] - [address] [place] [address] [phone] (Phone)  [phone] (Fax)

## 2019-08-14 RX ORDER — AZELASTINE 1 MG/ML
2 SPRAY, METERED NASAL 2 TIMES DAILY
Qty: 30 ML | Refills: 12 | Status: SHIPPED | OUTPATIENT
Start: 2019-08-14 | End: 2019-09-13 | Stop reason: SDUPTHER

## 2019-08-16 ENCOUNTER — PATIENT MESSAGE (OUTPATIENT)
Dept: FAMILY MEDICINE | Facility: CLINIC | Age: 69
End: 2019-08-16

## 2019-08-16 DIAGNOSIS — E03.9 HYPOTHYROIDISM, UNSPECIFIED TYPE: Primary | ICD-10-CM

## 2019-08-16 DIAGNOSIS — D64.9 ANEMIA, UNSPECIFIED TYPE: ICD-10-CM

## 2019-08-16 DIAGNOSIS — I10 ESSENTIAL HYPERTENSION: ICD-10-CM

## 2019-08-16 DIAGNOSIS — E55.9 VITAMIN D DEFICIENCY DISEASE: ICD-10-CM

## 2019-08-20 ENCOUNTER — LAB VISIT (OUTPATIENT)
Dept: LAB | Facility: HOSPITAL | Age: 69
End: 2019-08-20
Attending: INTERNAL MEDICINE
Payer: MEDICARE

## 2019-08-20 DIAGNOSIS — I10 ESSENTIAL HYPERTENSION: ICD-10-CM

## 2019-08-20 DIAGNOSIS — E55.9 VITAMIN D DEFICIENCY DISEASE: ICD-10-CM

## 2019-08-20 DIAGNOSIS — E03.9 HYPOTHYROIDISM, UNSPECIFIED TYPE: ICD-10-CM

## 2019-08-20 DIAGNOSIS — D64.9 ANEMIA, UNSPECIFIED TYPE: ICD-10-CM

## 2019-08-20 LAB
25(OH)D3+25(OH)D2 SERPL-MCNC: 38 NG/ML (ref 30–96)
ALBUMIN SERPL BCP-MCNC: 3.2 G/DL (ref 3.5–5.2)
ALP SERPL-CCNC: 41 U/L (ref 55–135)
ALT SERPL W/O P-5'-P-CCNC: 11 U/L (ref 10–44)
ANION GAP SERPL CALC-SCNC: 9 MMOL/L (ref 8–16)
AST SERPL-CCNC: 16 U/L (ref 10–40)
BASOPHILS # BLD AUTO: 0.05 K/UL (ref 0–0.2)
BASOPHILS NFR BLD: 0.9 % (ref 0–1.9)
BILIRUB SERPL-MCNC: 0.3 MG/DL (ref 0.1–1)
BUN SERPL-MCNC: 13 MG/DL (ref 8–23)
CALCIUM SERPL-MCNC: 9.8 MG/DL (ref 8.7–10.5)
CHLORIDE SERPL-SCNC: 98 MMOL/L (ref 95–110)
CHOLEST SERPL-MCNC: 233 MG/DL (ref 120–199)
CHOLEST/HDLC SERPL: 2.1 {RATIO} (ref 2–5)
CO2 SERPL-SCNC: 28 MMOL/L (ref 23–29)
CREAT SERPL-MCNC: 0.8 MG/DL (ref 0.5–1.4)
DIFFERENTIAL METHOD: ABNORMAL
EOSINOPHIL # BLD AUTO: 0.3 K/UL (ref 0–0.5)
EOSINOPHIL NFR BLD: 4.7 % (ref 0–8)
ERYTHROCYTE [DISTWIDTH] IN BLOOD BY AUTOMATED COUNT: 13 % (ref 11.5–14.5)
EST. GFR  (AFRICAN AMERICAN): >60 ML/MIN/1.73 M^2
EST. GFR  (NON AFRICAN AMERICAN): >60 ML/MIN/1.73 M^2
FERRITIN SERPL-MCNC: 79 NG/ML (ref 20–300)
GLUCOSE SERPL-MCNC: 75 MG/DL (ref 70–110)
HCT VFR BLD AUTO: 35.1 % (ref 37–48.5)
HDLC SERPL-MCNC: 113 MG/DL (ref 40–75)
HDLC SERPL: 48.5 % (ref 20–50)
HGB BLD-MCNC: 11.5 G/DL (ref 12–16)
IMM GRANULOCYTES # BLD AUTO: 0.02 K/UL (ref 0–0.04)
IMM GRANULOCYTES NFR BLD AUTO: 0.4 % (ref 0–0.5)
IRON SERPL-MCNC: 66 UG/DL (ref 30–160)
LDLC SERPL CALC-MCNC: 104.4 MG/DL (ref 63–159)
LYMPHOCYTES # BLD AUTO: 2 K/UL (ref 1–4.8)
LYMPHOCYTES NFR BLD: 37.5 % (ref 18–48)
MCH RBC QN AUTO: 31.5 PG (ref 27–31)
MCHC RBC AUTO-ENTMCNC: 32.8 G/DL (ref 32–36)
MCV RBC AUTO: 96 FL (ref 82–98)
MONOCYTES # BLD AUTO: 0.5 K/UL (ref 0.3–1)
MONOCYTES NFR BLD: 8.8 % (ref 4–15)
NEUTROPHILS # BLD AUTO: 2.6 K/UL (ref 1.8–7.7)
NEUTROPHILS NFR BLD: 47.7 % (ref 38–73)
NONHDLC SERPL-MCNC: 120 MG/DL
NRBC BLD-RTO: 0 /100 WBC
PLATELET # BLD AUTO: 278 K/UL (ref 150–350)
PMV BLD AUTO: 10.5 FL (ref 9.2–12.9)
POTASSIUM SERPL-SCNC: 3.8 MMOL/L (ref 3.5–5.1)
PROT SERPL-MCNC: 6.5 G/DL (ref 6–8.4)
RBC # BLD AUTO: 3.65 M/UL (ref 4–5.4)
SATURATED IRON: 18 % (ref 20–50)
SODIUM SERPL-SCNC: 135 MMOL/L (ref 136–145)
T4 FREE SERPL-MCNC: 1.23 NG/DL (ref 0.71–1.51)
TOTAL IRON BINDING CAPACITY: 376 UG/DL (ref 250–450)
TRANSFERRIN SERPL-MCNC: 254 MG/DL (ref 200–375)
TRIGL SERPL-MCNC: 78 MG/DL (ref 30–150)
TSH SERPL DL<=0.005 MIU/L-ACNC: 0.58 UIU/ML (ref 0.4–4)
WBC # BLD AUTO: 5.34 K/UL (ref 3.9–12.7)

## 2019-08-20 PROCEDURE — 80053 COMPREHEN METABOLIC PANEL: CPT | Mod: HCNC

## 2019-08-20 PROCEDURE — 80061 LIPID PANEL: CPT | Mod: HCNC

## 2019-08-20 PROCEDURE — 84443 ASSAY THYROID STIM HORMONE: CPT | Mod: HCNC

## 2019-08-20 PROCEDURE — 82728 ASSAY OF FERRITIN: CPT | Mod: HCNC

## 2019-08-20 PROCEDURE — 85025 COMPLETE CBC W/AUTO DIFF WBC: CPT | Mod: HCNC

## 2019-08-20 PROCEDURE — 83540 ASSAY OF IRON: CPT | Mod: HCNC

## 2019-08-20 PROCEDURE — 84439 ASSAY OF FREE THYROXINE: CPT | Mod: HCNC

## 2019-08-20 PROCEDURE — 36415 COLL VENOUS BLD VENIPUNCTURE: CPT | Mod: HCNC,PO

## 2019-08-20 PROCEDURE — 82306 VITAMIN D 25 HYDROXY: CPT | Mod: HCNC

## 2019-09-11 ENCOUNTER — PATIENT MESSAGE (OUTPATIENT)
Dept: FAMILY MEDICINE | Facility: CLINIC | Age: 69
End: 2019-09-11

## 2019-09-12 ENCOUNTER — OFFICE VISIT (OUTPATIENT)
Dept: NEUROLOGY | Facility: CLINIC | Age: 69
End: 2019-09-12
Payer: MEDICARE

## 2019-09-12 VITALS
HEART RATE: 60 BPM | HEIGHT: 61 IN | BODY MASS INDEX: 24.44 KG/M2 | DIASTOLIC BLOOD PRESSURE: 51 MMHG | SYSTOLIC BLOOD PRESSURE: 112 MMHG | WEIGHT: 129.44 LBS

## 2019-09-12 DIAGNOSIS — G43.009 MIGRAINE WITHOUT AURA AND WITHOUT STATUS MIGRAINOSUS, NOT INTRACTABLE: Primary | ICD-10-CM

## 2019-09-12 PROCEDURE — 1101F PR PT FALLS ASSESS DOC 0-1 FALLS W/OUT INJ PAST YR: ICD-10-PCS | Mod: HCNC,CPTII,S$GLB, | Performed by: NEUROMUSCULOSKELETAL MEDICINE & OMM

## 2019-09-12 PROCEDURE — 3078F PR MOST RECENT DIASTOLIC BLOOD PRESSURE < 80 MM HG: ICD-10-PCS | Mod: HCNC,CPTII,S$GLB, | Performed by: NEUROMUSCULOSKELETAL MEDICINE & OMM

## 2019-09-12 PROCEDURE — 99999 PR PBB SHADOW E&M-EST. PATIENT-LVL IV: CPT | Mod: PBBFAC,HCNC,, | Performed by: NEUROMUSCULOSKELETAL MEDICINE & OMM

## 2019-09-12 PROCEDURE — 3074F SYST BP LT 130 MM HG: CPT | Mod: HCNC,CPTII,S$GLB, | Performed by: NEUROMUSCULOSKELETAL MEDICINE & OMM

## 2019-09-12 PROCEDURE — 99214 PR OFFICE/OUTPT VISIT, EST, LEVL IV, 30-39 MIN: ICD-10-PCS | Mod: HCNC,S$GLB,, | Performed by: NEUROMUSCULOSKELETAL MEDICINE & OMM

## 2019-09-12 PROCEDURE — 99214 OFFICE O/P EST MOD 30 MIN: CPT | Mod: HCNC,S$GLB,, | Performed by: NEUROMUSCULOSKELETAL MEDICINE & OMM

## 2019-09-12 PROCEDURE — 99999 PR PBB SHADOW E&M-EST. PATIENT-LVL IV: ICD-10-PCS | Mod: PBBFAC,HCNC,, | Performed by: NEUROMUSCULOSKELETAL MEDICINE & OMM

## 2019-09-12 PROCEDURE — 3078F DIAST BP <80 MM HG: CPT | Mod: HCNC,CPTII,S$GLB, | Performed by: NEUROMUSCULOSKELETAL MEDICINE & OMM

## 2019-09-12 PROCEDURE — 1101F PT FALLS ASSESS-DOCD LE1/YR: CPT | Mod: HCNC,CPTII,S$GLB, | Performed by: NEUROMUSCULOSKELETAL MEDICINE & OMM

## 2019-09-12 PROCEDURE — 3074F PR MOST RECENT SYSTOLIC BLOOD PRESSURE < 130 MM HG: ICD-10-PCS | Mod: HCNC,CPTII,S$GLB, | Performed by: NEUROMUSCULOSKELETAL MEDICINE & OMM

## 2019-09-12 RX ORDER — BUTALBITAL, ACETAMINOPHEN AND CAFFEINE 50; 325; 40 MG/1; MG/1; MG/1
1 TABLET ORAL EVERY 6 HOURS PRN
Qty: 30 TABLET | Refills: 0 | Status: SHIPPED | OUTPATIENT
Start: 2019-09-12 | End: 2022-09-01 | Stop reason: SDUPTHER

## 2019-09-12 RX ORDER — BUTALBITAL, ASPIRIN, AND CAFFEINE 325; 50; 40 MG/1; MG/1; MG/1
1 CAPSULE ORAL
COMMUNITY
End: 2022-09-06

## 2019-09-12 RX ORDER — FLUTICASONE PROPIONATE 50 MCG
SPRAY, SUSPENSION (ML) NASAL
Qty: 16 G | Refills: 12 | Status: SHIPPED | OUTPATIENT
Start: 2019-09-12 | End: 2020-11-13

## 2019-09-12 NOTE — TELEPHONE ENCOUNTER
----- Message from García Zimmer sent at 9/12/2019  1:28 PM CDT -----  Contact: Self: [phone]  Type: Patient Call Back     Who called:Self    What is the request in detail:Patient called asking for you specifically, she would like to speak to you soon.    Can the clinic reply by [facility]? No  Would the patient rather a call back or a response via [facility]? Callback    Best call back number:[phone]

## 2019-09-12 NOTE — PROGRESS NOTES
History:  Patient presents for follow-up for her migraines which seemed to be doing well overall.  She takes Fioricet 1-2 times per week.  She occasionally has need for 2 Fioricet as per day.  She has some neck pain radiating from the headaches.  Previous note:  1-17-19:   patient presents for follow-up for migraines.  She has 2 headaches per week at the present time and some months she has none.  Fioricet seems to work well.  Imitrex seems upper blood pressure.  The bad headaches have quieted down.  previous note:  3-14-18  Patient presents for follow-up for her headaches.  She describes a pressure pain in the head with associated photophobia and nausea and occasional vomiting.  She complains of feeling flushed in the face yesterday and today.  She initially blamed it on the Imitrex for which she took initially 100 mg 2 days ago for a headache and recently half of a 50 mg.  Yesterday she did not have the Imitrex and still had the flushing sensation in the face.  On further questioning patient has been having a lot of neck problems and treated by orthopedics with meloxicam 15 mg for her back and neck.  Patient took meloxicam over the last 3 days which in my opinion is probably the cause of her flushing sensation in the face and queasiness.  She has had Flexeril before but could not tolerate a full dose.  She has tried Robaxin and again could not tolerate those.  Previous note: 6-19-17:  Patient presents for follow-up for her headaches and new arm pain.  She complains of left shoulder p pain radiating into the arm and hand.  She was seen by orthopedics 3 weeks ago and had an injection in the shoulder blade area.  This seemed to help some.  The left arm pain occasionally will radiate down to the 2 middle fingers with tingling.  She is scheduled for pain management in July.  She notes that moving her neck to the left increases the left sciatic pain.  She was recently changed from meloxicam to Celebrex.  X-rays  apparently show evidence of C4-5-6 degenerative arthritis.  Overall her migraines have been much better.  Previous note: 2-7-17:  Patient presents for follow-up after several years.  She complains of a bad headache last week with pressure on top of the head and a feeling of off balance to the right.  Headache was associated with nausea and vomiting with intensity of the headache up to 9/10.  She also has sinus problems which she thinks may trigger the headaches.  Imitrex does help.  She also has neck problems on a chronic basis.  She has some Flexeril tablets which I've encouraged her to try for the neck problems.  She is scheduled to see the ENT doctors for her sinus problems.           Gen. Appearance: Well-developed with no obvious deformities  Carotid arteries symmetrical pulses  Peripheral vascular shows symmetrical pulses with no obvious edema or tenderness  Neurological Exam:  Mental status-alert and oriented to person, place, and time; attention span and concentration is good. Fund of knowledge-patient is aware of current events and able to give detailed history of the current problem.recent and remote memory seems intact. Language function is normal with no evidence of aphasia     Cranial nerves:Visual acuity to hand chart -normal; visual fields to confrontation normal;pupils were equal and reactive to light ; funduscopic examination was normal with sharp disc margins. external ocular movements were full with no nystagmus. Facial sensation to pinprick and corneal reflexes intact; Facial muscles were symmetrical. Hearing is unimpaired symmetrical finger rub; Tongue and palate movements were normal with swallowing unimpaired. Shoulder shrug was intact with good strength Speech was normal     Motor examination: Upper and Lower extremities - Normal and symmetrical;muscle tone was normal ; right-handed  Sensory examination:Upper and lower extremities - Pinprick and soft touch were normal and symmetrical.  Vibration sense was normal at the toes for age 15-20 seconds  Deep tendon reflexes were 1-2+ symmetrical. Both plantar responses were flexor  Cerebellar examination upper: Normal finger to nose and rapid alternating movements  Gait: Steady with no ataxia; heel and toe walk normal  Romberg test: negative Tandem gait: Normal   Involuntary movements: Negative  Cervical examination: Full range of motion with no pain Cervical tenderness:negative  Lumbar examination: Low back tenderness-negative Sciatic notch tenderness-negative Straight leg raising test-negative     Impression:Migraine headaches; cervical syndrome; sinus headaches; left l ateral epicondylitis; left cervical radiculopathy  Recommendations/plan:  Continue Fioricet p.r.n. headaches no more than 1-2 times per week.   Follow-up in 6months.

## 2019-09-16 RX ORDER — LEVOCETIRIZINE DIHYDROCHLORIDE 5 MG/1
TABLET, FILM COATED ORAL
Qty: 30 TABLET | Refills: 12 | Status: SHIPPED | OUTPATIENT
Start: 2019-09-16 | End: 2020-12-22

## 2019-09-16 RX ORDER — AZELASTINE 1 MG/ML
SPRAY, METERED NASAL
Qty: 30 ML | Refills: 12 | Status: SHIPPED | OUTPATIENT
Start: 2019-09-16 | End: 2020-10-05

## 2019-09-20 ENCOUNTER — PATIENT OUTREACH (OUTPATIENT)
Dept: ADMINISTRATIVE | Facility: OTHER | Age: 69
End: 2019-09-20

## 2019-09-23 ENCOUNTER — OFFICE VISIT (OUTPATIENT)
Dept: SPORTS MEDICINE | Facility: CLINIC | Age: 69
End: 2019-09-23
Payer: MEDICARE

## 2019-09-23 ENCOUNTER — HOSPITAL ENCOUNTER (OUTPATIENT)
Dept: RADIOLOGY | Facility: HOSPITAL | Age: 69
Discharge: HOME | End: 2019-09-23
Attending: ORTHOPAEDIC SURGERY
Payer: MEDICARE

## 2019-09-23 VITALS
WEIGHT: 129 LBS | DIASTOLIC BLOOD PRESSURE: 70 MMHG | SYSTOLIC BLOOD PRESSURE: 136 MMHG | BODY MASS INDEX: 24.35 KG/M2 | HEIGHT: 61 IN | HEART RATE: 62 BPM

## 2019-09-23 DIAGNOSIS — M25.512 LEFT SHOULDER PAIN, UNSPECIFIED CHRONICITY: ICD-10-CM

## 2019-09-23 DIAGNOSIS — M25.512 LEFT SHOULDER PAIN, UNSPECIFIED CHRONICITY: Primary | ICD-10-CM

## 2019-09-23 PROCEDURE — 73030 X-RAY EXAM OF SHOULDER: CPT | Mod: 26,HCNC,LT, | Performed by: RADIOLOGY

## 2019-09-23 PROCEDURE — 1101F PT FALLS ASSESS-DOCD LE1/YR: CPT | Mod: HCNC,CPTII,S$GLB, | Performed by: ORTHOPAEDIC SURGERY

## 2019-09-23 PROCEDURE — 3078F PR MOST RECENT DIASTOLIC BLOOD PRESSURE < 80 MM HG: ICD-10-PCS | Mod: HCNC,CPTII,S$GLB, | Performed by: ORTHOPAEDIC SURGERY

## 2019-09-23 PROCEDURE — 73030 X-RAY EXAM OF SHOULDER: CPT | Mod: TC,HCNC,FY,PO,LT

## 2019-09-23 PROCEDURE — 99999 PR PBB SHADOW E&M-EST. PATIENT-LVL IV: ICD-10-PCS | Mod: PBBFAC,HCNC,, | Performed by: ORTHOPAEDIC SURGERY

## 2019-09-23 PROCEDURE — 99203 OFFICE O/P NEW LOW 30 MIN: CPT | Mod: HCNC,S$GLB,, | Performed by: ORTHOPAEDIC SURGERY

## 2019-09-23 PROCEDURE — 99203 PR OFFICE/OUTPT VISIT, NEW, LEVL III, 30-44 MIN: ICD-10-PCS | Mod: HCNC,S$GLB,, | Performed by: ORTHOPAEDIC SURGERY

## 2019-09-23 PROCEDURE — 1101F PR PT FALLS ASSESS DOC 0-1 FALLS W/OUT INJ PAST YR: ICD-10-PCS | Mod: HCNC,CPTII,S$GLB, | Performed by: ORTHOPAEDIC SURGERY

## 2019-09-23 PROCEDURE — 3078F DIAST BP <80 MM HG: CPT | Mod: HCNC,CPTII,S$GLB, | Performed by: ORTHOPAEDIC SURGERY

## 2019-09-23 PROCEDURE — 3075F SYST BP GE 130 - 139MM HG: CPT | Mod: HCNC,CPTII,S$GLB, | Performed by: ORTHOPAEDIC SURGERY

## 2019-09-23 PROCEDURE — 99999 PR PBB SHADOW E&M-EST. PATIENT-LVL IV: CPT | Mod: PBBFAC,HCNC,, | Performed by: ORTHOPAEDIC SURGERY

## 2019-09-23 PROCEDURE — 3075F PR MOST RECENT SYSTOLIC BLOOD PRESS GE 130-139MM HG: ICD-10-PCS | Mod: HCNC,CPTII,S$GLB, | Performed by: ORTHOPAEDIC SURGERY

## 2019-09-23 PROCEDURE — 73030 XR SHOULDER COMPLETE 2 OR MORE VIEWS LEFT: ICD-10-PCS | Mod: 26,HCNC,LT, | Performed by: RADIOLOGY

## 2019-09-23 RX ORDER — MELOXICAM 15 MG/1
15 TABLET ORAL DAILY
Qty: 30 TABLET | Refills: 0 | Status: SHIPPED | OUTPATIENT
Start: 2019-09-23 | End: 2019-10-24 | Stop reason: SDUPTHER

## 2019-09-23 NOTE — PROGRESS NOTES
CC: left shoulder pain     Aleksandra Santana is a 69 y.o. female presents for evaluation of her left shoulder.  Her shoulder began hurting 3 weeks ago.  It is not associated any trauma or inciting event.  She woke up with the pain. Her pain radiates to the elbow but not past the elbow to the fingers.  She denies any paresthesias.      Denies any previous shoulder injuries or surgeries.  Her pain is worse with overhead activity and better than rest.    Review of Systems   Constitution: Negative. Negative for chills, fever and night sweats.   HENT: Negative for congestion and headaches.    Eyes: Negative for blurred vision, left vision loss and right vision loss.   Cardiovascular: Negative for chest pain and syncope.   Respiratory: Negative for cough and shortness of breath.    Endocrine: Negative for polydipsia, polyphagia and polyuria.   Hematologic/Lymphatic: Negative for bleeding problem. Does not bruise/bleed easily.   Skin: Negative for dry skin, itching and rash.   Musculoskeletal: Negative for falls and muscle weakness.   Gastrointestinal: Negative for abdominal pain and bowel incontinence.   Genitourinary: Negative for bladder incontinence and nocturia.   Neurological: Negative for disturbances in coordination, loss of balance and seizures.   Psychiatric/Behavioral: Negative for depression. The patient does not have insomnia.    Allergic/Immunologic: Negative for hives and persistent infections.     PAST MEDICAL HISTORY:   Past Medical History:   Diagnosis Date    Abnormal EKG     Allergy     seasonal    Anxiety     Breast cyst     Cataract     Fibrocystic breast     GERD (gastroesophageal reflux disease)     History of colonic polyps     1 polyp 11/14 --5 yrs.    Hypertension     Hypothyroidism     IBS (irritable bowel syndrome)     Keloid cicatrix     Migraine syndrome     OA (osteoarthritis)     Osteopenia     BMD 5/14 --no tx -repeat 2 yrs    Osteoporosis, postmenopausal     Palpitations      SOB (shortness of breath)      PAST SURGICAL HISTORY:   Past Surgical History:   Procedure Laterality Date    APPENDECTOMY       SECTION      x2    COLONOSCOPY N/A 11/10/2014    [facility]formed [facility] ([facility]    ESOPHAGOGASTRODUODENOSCOPY (EGD) N/A 11/10/2014    Performed by [facility] at [facility] END[facility] (4TH FLR)    HYSTERECTOMY  age 33    COLEEN/BSO for endometriosis    OOPHORECTOMY      TONSILLECTOMY       FAMILY HISTORY:   Family History   Problem Relation Age of Onset    Cataracts Mother     No Known Problems Father     Ovarian cancer Sister     No Known Problems Brother     No Known Problems Maternal Aunt     No Known Problems Maternal Uncle     No Known Problems Paternal Aunt     No Known Problems Paternal Uncle     No Known Problems Maternal Grandmother     No Known Problems Maternal Grandfather     No Known Problems Paternal Grandmother     No Known Problems Paternal Grandfather     Colon cancer Neg Hx     Breast cancer Neg Hx     Amblyopia Neg Hx     Blindness Neg Hx     Cancer Neg Hx     Diabetes Neg Hx     Glaucoma Neg Hx     Hypertension Neg Hx     Macular degeneration Neg Hx     Retinal detachment Neg Hx     Strabismus Neg Hx     Stroke Neg Hx     Thyroid disease Neg Hx     Melanoma Neg Hx      SOCIAL HISTORY:   Social History     Socioeconomic History    Marital status:      Spouse name: Not on file    Number of children: Not on file    Years of education: Not on file    Highest education level: Not on file   Occupational History    Not on file   Social Needs    Financial resource strain: Not on file    Food insecurity:     Worry: Not on file     Inability: Not on file    Transportation needs:     Medical: Not on file     Non-medical: Not on file   Tobacco Use    Smoking status: Never Smoker    Smokeless tobacco: Never Used   Substance and Sexual Activity    Alcohol use: Yes     Alcohol/week: 1.0 standard drinks      Types: 1 Glasses of wine per week     Comment: ocasionally    Drug use: No    Sexual activity: Not Currently     Partners: Male     Birth control/protection: None, Post-menopausal   Lifestyle    Physical activity:     Days per week: Not on file     Minutes per session: Not on file    Stress: Not on file   Relationships    Social connections:     Talks on phone: Not on file     Gets together: Not on file     Attends [employer] service: Not on file     Active member of club or organization: Not on file     Attends meetings of clubs or organizations: Not on file     Relationship status: Not on file   Other Topics Concern    Are you pregnant or think you may be? Not Asked    Breast-feeding Not Asked   Social History Narrative    Not on file       MEDICATIONS:   Current Outpatient Medications:     amLODIPine (NORVASC) 5 MG tablet, Take 1 tablet (5 mg total) by mouth once daily., Disp: 90 tablet, Rfl: 11    azelastine (ASTELIN) 137 mcg (0.1 %) nasal spray, INHALE 2 SPRAYS IN EACH NOSTRIL TWICE DAILY, Disp: 30 mL, Rfl: 12    butalbital-acetaminophen-caffeine -40 mg (FIORICET, ESGIC) -40 mg per tablet, Take 1 tablet by mouth every 6 (six) hours as needed., Disp: 30 tablet, Rfl: 0    butalbital-aspirin-caffeine -40 mg (FIORINAL) -40 mg Cap, Take 1 capsule by mouth., Disp: , Rfl:     calcium-vitamin D tablet 600 mg-200 units, Take 2 tablets by mouth once., Disp: , Rfl:     carvedilol (COREG) 25 MG tablet, TAKE ONE TABLET BY MOUTH EVERY DAY, Disp: 30 tablet, Rfl: 12    fluticasone propionate (FLONASE) 50 mcg/actuation nasal spray, INHALE 2 SPRAYS IN EACH NOSTRIL ONCE DAILY, Disp: 16 g, Rfl: 12    folic acid (FOLVITE) 1 MG tablet, TAKE ONE TABLET BY MOUTH EVERY DAY, Disp: 90 tablet, Rfl: 12    hydroCHLOROthiazide (HYDRODIURIL) 25 MG tablet, TAKE ONE TABLET BY MOUTH once DAILY, Disp: 90 tablet, Rfl: 0    levocetirizine (XYZAL) 5 MG tablet, TAKE ONE TABLET BY MOUTH EVERY DAY, Disp: 30  "tablet, Rfl: 12    levothyroxine (SYNTHROID) 88 MCG tablet, Take 1 tablet (88 mcg total) by mouth once daily., Disp: 30 tablet, Rfl: 6    meclizine (ANTIVERT) 25 mg tablet, Take 1 tablet (25 mg total) by mouth 3 (three) times daily as needed for Dizziness or Nausea., Disp: 30 tablet, Rfl: 0    olmesartan (BENICAR) 40 MG tablet, Take 1 tablet (40 mg total) by mouth once daily., Disp: 90 tablet, Rfl: 3    olopatadine (PATANOL) 0.1 % ophthalmic solution, Place 1 drop into both eyes 2 (two) times daily., Disp: 5 mL, Rfl: 5    omeprazole (PRILOSEC) 20 MG capsule, Take 1 capsule (20 mg total) by mouth before breakfast., Disp: 90 capsule, Rfl: 3    PREMARIN 1.25 mg tablet, TAKE ONE TABLET BY MOUTH EVERY DAY, Disp: 30 tablet, Rfl: 3    promethazine (PHENERGAN) 12.5 MG Tab, TAKE ONE Tablet BY MOUTH EVERY 6 HOURS AS NEEDED, Disp: 15 tablet, Rfl: 2    celecoxib (CELEBREX) 200 MG capsule, TAKE ONE CAPSULE BY MOUTH TWICE DAILY (Patient not taking: Reported on 9/23/2019), Disp: 60 capsule, Rfl: 1    ibandronate (BONIVA) 150 mg tablet, Take 1 tablet (150 mg total) by mouth every 30 days., Disp: 1 tablet, Rfl: 11  ALLERGIES:   Review of patient's allergies indicates:   Allergen Reactions    Codeine     Iodine and iodide containing products     Iodinated contrast media      Rash (skin)^  Rash (skin)^    Mobic [meloxicam]     Doxycycline Rash       VITAL SIGNS: /70   Pulse 62   Ht 5' 1" (1.549 m)   Wt 58.5 kg (129 lb)   BMI 24.37 kg/m²      PHYSICAL EXAMINATION:  General:  The patient is alert and oriented x 3.  Mood is pleasant.  Observation of ears, eyes and nose reveal no gross abnormalities.  No labored breathing observed.  Gait is coordinated. Patient can toe walk and heel walk without difficulty.      left Shoulder / Upper Extremity Exam    OBSERVATION:     Swelling  none  Deformity  none   Discoloration  none   Scapular winging none   Scars   none  Atrophy  none    TENDERNESS / CREPITUS (T/C):      "     T/C      T/C   Clavicle   -/-  SUPRAspinatus    -/-     AC Jt.    -/-  INFRAspinatus  -/-    SC Jt.    -/-  Deltoid    -/-      G. Tuberosity  -/-  LH BICEP groove  +/-   Acromion:  -/-  Midline Neck   -/-     Scapular Spine -/-  Trapezium   -/-   SMA Scapula  -/-  GH jt. line - post  -/-     Scapulothoracic  -/-         ROM: (* = with pain)  Right shoulder   Left shoulder        AROM (PROM)   AROM (PROM)   FE    170° (175°)     170° (175°)     ER at 0°    60°  (65°)    60°  (65°)   ER at 90° ABD  90°  (90°)    90°  (90°)   IR at 90°  ABD   NA  (40°)     NA  (40°)      IR (spine level)   T10     T10    STRENGTH: (* = with pain) RIGHT SHOULDER  LEFT SHOULDER   SCAPTION at 0  5/5    5/5   SCAPTION at 30  5/5    5/5    IR    5/5    5/5   ER    5/5    5/5   BICEPS   5/5    5/5   Deltoid    5/5    5/5     SIGNS:  Painful side       NEER   +    ORAYOS  neg    LEMON   +    SPEEDS  neg     DROP ARM   neg   BELLY PRESS neg   Superior escape none    LIFT-OFF  neg   X-Body ADD    neg    MOVING VALGUS neg        STABILITY TESTING    RIGHT SHOULDER   LEFT SHOULDER     Translation     Anterior  up face     up face    Posterior  up face    up face    Sulcus   < 10mm    < 10 mm     Signs   Apprehension   neg      neg       Relocation   no change     no change      Jerk test  neg     neg    EXTREMITY NEURO-VASCULAR EXAM    Sensation grossly intact to light touch all dermatomal regions.    DTR 2+ Biceps, Triceps, BR and Negative Primos sign   Grossly intact motor function at Elbow, Wrist and Hand   Distal pulses radial and ulnar 2+, brisk cap refill, symmetric.      NECK:  Painless FROM and spinous processes non-tender. Negative Spurlings sign.      OTHER FINDINGS:  + scapular dyskinesia    XRAYS:  Shoulder trauma series left,  were obtained and reviewed  No convincing fracture or dislocation is noted. The osseous structures appear well mineralized and well aligned            ASSESSMENT:   left:  1. Shoulder pain,  impingement   2.  Scapular dyskinesia    PLAN:    PT for scapular stabilization: Scapular dyskinesia   Scapular stabilization - Marie protocol, 1-3x/week x 6-8 weeks with HEP      All questions were answered, pt will contact us for questions or concerns in the interim.

## 2019-09-24 ENCOUNTER — PES CALL (OUTPATIENT)
Dept: ADMINISTRATIVE | Facility: CLINIC | Age: 69
End: 2019-09-24

## 2019-09-25 ENCOUNTER — OFFICE VISIT (OUTPATIENT)
Dept: DERMATOLOGY | Facility: CLINIC | Age: 69
End: 2019-09-25
Payer: MEDICARE

## 2019-09-25 DIAGNOSIS — L98.9 DISEASE OF SKIN AND SUBCUTANEOUS TISSUE: Primary | ICD-10-CM

## 2019-09-25 DIAGNOSIS — L82.1 SK (SEBORRHEIC KERATOSIS): ICD-10-CM

## 2019-09-25 PROCEDURE — 88305 TISSUE SPECIMEN TO PATHOLOGY, DERMATOLOGY: ICD-10-PCS | Mod: 26,HCNC,, | Performed by: PATHOLOGY

## 2019-09-25 PROCEDURE — 88312 TISSUE SPECIMEN TO PATHOLOGY, DERMATOLOGY: ICD-10-PCS | Mod: 26,HCNC,, | Performed by: PATHOLOGY

## 2019-09-25 PROCEDURE — 88312 SPECIAL STAINS GROUP 1: CPT | Mod: 26,HCNC,, | Performed by: PATHOLOGY

## 2019-09-25 PROCEDURE — 11104 PUNCH BX SKIN SINGLE LESION: CPT | Mod: HCNC,S$GLB,, | Performed by: DERMATOLOGY

## 2019-09-25 PROCEDURE — 1101F PT FALLS ASSESS-DOCD LE1/YR: CPT | Mod: HCNC,CPTII,S$GLB, | Performed by: DERMATOLOGY

## 2019-09-25 PROCEDURE — 88305 TISSUE EXAM BY PATHOLOGIST: CPT | Mod: HCNC | Performed by: PATHOLOGY

## 2019-09-25 PROCEDURE — 99999 PR PBB SHADOW E&M-EST. PATIENT-LVL III: ICD-10-PCS | Mod: PBBFAC,HCNC,, | Performed by: DERMATOLOGY

## 2019-09-25 PROCEDURE — 99212 PR OFFICE/OUTPT VISIT, EST, LEVL II, 10-19 MIN: ICD-10-PCS | Mod: 25,HCNC,S$GLB, | Performed by: DERMATOLOGY

## 2019-09-25 PROCEDURE — 1101F PR PT FALLS ASSESS DOC 0-1 FALLS W/OUT INJ PAST YR: ICD-10-PCS | Mod: HCNC,CPTII,S$GLB, | Performed by: DERMATOLOGY

## 2019-09-25 PROCEDURE — 99999 PR PBB SHADOW E&M-EST. PATIENT-LVL III: CPT | Mod: PBBFAC,HCNC,, | Performed by: DERMATOLOGY

## 2019-09-25 PROCEDURE — 99212 OFFICE O/P EST SF 10 MIN: CPT | Mod: 25,HCNC,S$GLB, | Performed by: DERMATOLOGY

## 2019-09-25 PROCEDURE — 11104 PR PUNCH BIOPSY, SKIN, SINGLE LESION: ICD-10-PCS | Mod: HCNC,S$GLB,, | Performed by: DERMATOLOGY

## 2019-09-25 PROCEDURE — 87290 VARICELLA ZOSTER AG IF: CPT | Mod: HCNC

## 2019-09-25 RX ORDER — GABAPENTIN 300 MG/1
CAPSULE ORAL
Qty: 90 CAPSULE | Refills: 1 | Status: SHIPPED | OUTPATIENT
Start: 2019-09-25 | End: 2021-03-12

## 2019-09-25 RX ORDER — VALACYCLOVIR HYDROCHLORIDE 1 G/1
TABLET, FILM COATED ORAL
Qty: 21 TABLET | Refills: 0 | Status: SHIPPED | OUTPATIENT
Start: 2019-09-25

## 2019-09-25 NOTE — PROGRESS NOTES
Subjective:       Patient ID:  Aleksandra Santana is a 69 y.o. female who presents for   Chief Complaint   Patient presents with    Rash     Rash  - Initial  Affected locations: neck and back  Duration: 1 week  Signs / symptoms: itching  Aggravated by: nothing  Relieving factors/Treatments tried: OTC hydrocortisone  Improvement on treatment: no relief      Pt presents today for a rash on neck and back. Has had rash x 1 week with OTC hydrocortisone for treatment which she thinks made the redness worse. Pt experiencing mild itching, but also pain in the region. Thinks the pain may be due to shoulder problems. Describes it as sharp, burning pain. Did have chicken pox when younger, but no hx of shingles and no shingles vaccine.    Also has a growth on lower back x yrs that gets irritated easily.    Review of Systems   Constitutional: Negative for fever, chills, weight loss, weight gain, fatigue, night sweats and malaise.   Skin: Positive for rash. Negative for daily sunscreen use and activity-related sunscreen use.   Hematologic/Lymphatic: Bruises/bleeds easily.        Objective:    Physical Exam   Constitutional: She appears well-developed and well-nourished. No distress.   Neurological: She is alert and oriented to person, place, and time. She is not disoriented.   Psychiatric: She has a normal mood and affect.   Skin:   Areas Examined (abnormalities noted in diagram):   Head / Face Inspection Performed  Neck Inspection Performed  Chest / Axilla Inspection Performed  Back Inspection Performed  RUE Inspected  LUE Inspection Performed                  Diagram Legend     Erythematous scaling macule/papule c/w actinic keratosis       Vascular papule c/w angioma      Pigmented verrucoid papule/plaque c/w seborrheic keratosis      Yellow umbilicated papule c/w sebaceous hyperplasia      Irregularly shaped tan macule c/w lentigo     1-2 mm smooth white papules consistent with Milia      Movable subcutaneous cyst with punctum  c/w epidermal inclusion cyst      Subcutaneous movable cyst c/w pilar cyst      Firm pink to brown papule c/w dermatofibroma      Pedunculated fleshy papule(s) c/w skin tag(s)      Evenly pigmented macule c/w junctional nevus     Mildly variegated pigmented, slightly irregular-bordered macule c/w mildly atypical nevus      Flesh colored to evenly pigmented papule c/w intradermal nevus       Pink pearly papule/plaque c/w basal cell carcinoma      Erythematous hyperkeratotic cursted plaque c/w SCC      Surgical scar with no sign of skin cancer recurrence      Open and closed comedones      Inflammatory papules and pustules      Verrucoid papule consistent consistent with wart     Erythematous eczematous patches and plaques     Dystrophic onycholytic nail with subungual debris c/w onychomycosis     Umbilicated papule    Erythematous-base heme-crusted tan verrucoid plaque consistent with inflamed seborrheic keratosis     Erythematous Silvery Scaling Plaque c/w Psoriasis     See annotation      Assessment / Plan:      Disease of skin and subcutaneous tissue  Counseled pt that the unilateral distribution and the description of the pain favors shingles. Punch bx and scraping for DFA performed today. I also prescribed her the appropriate meds for shingles and let her know she could start on them today or wait until DFA results are back if she prefers.    Punch biopsy procedure note:  Punch biopsy performed after verbal consent obtained. Area marked and prepped with alcohol. Approximately 1cc of 1% lidocaine with epinephrine injected. 3 mm disposable punch used to remove lesion. Hemostasis obtained and biopsy site closed with 1 - 2 Prolene sutures. Wound care instructions reviewed with patient and handout given.    Pathology Orders:     Normal Orders This Visit    Tissue Specimen To Pathology, Dermatology     Questions:    Directional Terms:  Other(comment)    Clinical Information:  r/o shingles vs. contact dermatitis vs.  folliculitis vs. other Comment - punch    Specific Site:  L upper back      -     Tissue Specimen To Pathology, Dermatology    -     Varicella zoster virus DFA Other (specify) (L back); Future; Expected date: 09/25/2019    -     valACYclovir (VALTREX) 1000 MG tablet; Take 1 tab po TID x 7 days  Dispense: 21 tablet; Refill: 0  -     gabapentin (NEURONTIN) 300 MG capsule; Take 1 po x 1 day, then 1 po BID x 1 day, then 1 po TID. When discontinuing, decrease to 1 po BID x 4 days then 1 po qd x 4 days then stop  Dispense: 90 capsule; Refill: 1    Seborrheic keratosis  These are benign inherited growths without a malignant potential. Reassurance given to patient. No treatment is necessary.   Treatment of benign, asymptomatic lesions may be considered cosmetic.  Warned about risk of hypo- or hyperpigmentation with treatment and risk of recurrence.    rtc 2 wks for suture removal

## 2019-09-25 NOTE — PATIENT INSTRUCTIONS
"Punch Biopsy Wound Care    Your doctor has performed a punch biopsy today.  A band aid and antibiotic ointment has been placed over the site.  This should remain in place for 24 hours.  It is recommended that you keep the area dry for the first 24 hours.  After 24 hours, you may remove the band aid and wash the area with warm soap and water and apply Vaseline jelly.  Many patients prefer to use Neosporin or Bacitracin ointment.  This is acceptable; however know that you can develop an allergy to this medication even if you have used it safely for years.  It is important to keep the area moist.  Letting it dry out and get air slows healing time, will worsen the scar, and make it more difficult to remove the stitches if they were placed.  Band aid is optional after first 24 hours.      If you notice increasing redness, tenderness, pain, or yellow drainage at the biopsy or surgical site, please notify your doctor.  These are signs of an infection.    If your biopsy/surgical site is bleeding, apply firm pressure for 15 minutes straight.  Repeat for another 15 minutes, if it is still bleeding.   If the surgical site continues to bleed, then please contact your doctor.      For [facility] users:   You will receive a [facility] notification after the pathologist has finished reviewing your biopsy specimen. Pathology results, however, will not be released online so you will see a "no content" message. Once your dermatologist reviews and clinically correlates your biopsy results, you will either receive a letter in the mail with the results of a phone call from your doctor's office if further explanation or treatment is warranted.       [address] [place] [address]/ [phone] FAX/ [email or web address]       CRYOSURGERY      Your doctor has used a method called cryosurgery to treat your skin condition. Cryosurgery refers to the use of very cold substances to treat a variety of skin conditions " such as warts, pre-skin cancers, molluscum contagiosum, sun spots, and several benign growths. The substance we use in cryosurgery is liquid nitrogen and is so cold (-195 degrees Celsius) that is burns when administered.     Following treatment in the office, the skin may immediately burn and become red. You may find the area around the lesion is affected as well. It is sometimes necessary to treat not only the lesion, but a small area of the surrounding normal skin to achieve a good response.     A blister, and even a blood filled blister, may form after treatment.   This is a normal response. If the blister is painful, it is acceptable to sterilize a needle and with rubbing alcohol and gently pop the blister. It is important that you gently wash the area with soap and warm water as the blister fluid may contain wart virus if a wart was treated. Do no remove the roof of the blister.     The area treated can take anywhere from 1-3 weeks to heal. Healing time depends on the kind skin lesion treated, the location, and how aggressively the lesion was treated. It is recommended that the areas treated are covered with Vaseline or bacitracin ointment and a band-aid. If a band-aid is not practical, just ointment applied several times per day will do. Keeping these areas moist will speed the healing time.    Treatment with liquid nitrogen can leave a scar. In dark skin, it may be a light or dark scar, in light skin it may be a white or pink scar. These will generally fade with time, but may never go away completely.     If you have any concerns after your treatment, please feel free to call the office.       [facility] [place] [address]/ [phone] FAX/ [email or web address]    SEBORRHEIC KERATOSES        What causes seborrheic keratoses?    Seborrheic keratoses are harmless, common skin growths that first appear during adult life.  As time goes by, more growths appear.  Some persons have a very  large number of them.  Seborrheic keratoses appear on both covered and uncovered parts of the body; they are not caused by sunlight.  The tendency to develop seborrheic keratoses is inherited.    Seborrheic keratoses are harmless and never become malignant.  They begin as slightly raised, light brown spots.  Gradually they thicken and take on a rough wartlike surface.  They slowly darken and may turn black.  These color changes are harmless.  Seborrheic keratoses are superficial and look as if they were stuck on the skin.  Persons who have had several seborrheic keratoses can usually recognize this type of benign growth.  However, if you are concerned or unsure about any growth, consult me.    Treatment    Seborrheic keratoses can easily be removed in the office.  The only reason for removing a seborrheic keratosis is your wish to get rid of it.      Summer Sun Protection      The [facility] would like to remind you of the importance of sun protection all year round and particularly during the summer when the suns rays are the strongest. It has been proven that both acute and chronic sun exposure damages our cells and leads to skin cancer. Beyond skin cancer, the sun causes 90% of the symptoms of pre-mature skin aging, including wrinkles, lentigines (brown spots), and thin, easily bruised skin. Proper sun protection can help prevent these unwanted conditions.    Many patients report that the dont go in the sun. It has been shown that the average person receives 18 hours of incidental sun exposure per week during activities such as walking through parking lots, driving, or sitting next to windows. This accumulates to several bad sunburns per year!    In choosing sunscreen, you want one that protects against both UVA and UVB rays. It is recommended that you use one of SPF 30 or higher. It is important to apply the sunscreen about 20 minutes prior to sun exposure. Most sunscreens are chemical  sunscreens and a reaction must take place in the skin so that they are effective. If they are applied and then you are immediately exposed to the sun or start sweating, this reaction has not had time to take place and you are therefore unprotected. Sunscreen needs to be reapplied every 2 hours if you are participating in water sports or sweating. We recommend Elta MD or Neutrogena Ultra Sheer Dry Touch SPF 55 for daily use; however there are many options and it is most important for you to find one that you will use on a consistent basis.    If you have sensitive skin, you may do best with a sunscreen that contains only physical blockers such as titanium dioxide or zinc oxide. These are typically thicker and harder to apply, however they afford very good protection. Neutrogena Sensitive Skin, Blue Lizard Sensitive Skin (pink top) or Neutrogena Pure and Free are popular ones.     Aside from sunscreen, clothes with UV protection, wide brimmed hats, and sunglasses are other means of sun protection that we recommend.                        [facility]  [address]  [place] [address]  Dept: [phone]  Dept Fax: [phone]

## 2019-09-26 LAB
DFA SPECIMEN: ABNORMAL
VARICELLA ZOSTER VIRUS DFA: POSITIVE

## 2019-09-30 ENCOUNTER — CLINICAL SUPPORT (OUTPATIENT)
Dept: REHABILITATION | Facility: HOSPITAL | Age: 69
End: 2019-09-30
Attending: ORTHOPAEDIC SURGERY
Payer: MEDICARE

## 2019-09-30 DIAGNOSIS — M25.512 ACUTE PAIN OF LEFT SHOULDER: Primary | ICD-10-CM

## 2019-09-30 PROCEDURE — 97161 PT EVAL LOW COMPLEX 20 MIN: CPT | Mod: HCNC

## 2019-09-30 PROCEDURE — 97110 THERAPEUTIC EXERCISES: CPT | Mod: HCNC

## 2019-09-30 NOTE — PLAN OF CARE
[facility]  Physical Therapy Initial Evaluation    Name: Aleksandra Santana  Clinic Number: [id number]    Therapy Diagnosis:   Encounter Diagnosis   Name Primary?    Acute pain of left shoulder Yes     Physician: Vernell Alvarez MD    Physician Orders: PT Eval and Treat   Medical Diagnosis: M25.512 (ICD-10-CM) - Left shoulder pain, unspecified chronicity  Date of Surgery: none  Evaluation Date: 2019  Authorization Period Expiration: 19  Plan of Care Certification Period: 19  Visit # / Visits authorized:     Time In: 130 pm  Time Out: 205 pm  Total Billable Time: 35 minutes    Precautions: Standard     Subjective   Date of onset:3-4 weeks prior  History of current condition - Aleksandra reports: she notes L shoulder pain started 3-4 weeks prior with insideous onset. She states she has sometimes been sleeping on her L side, but lately trying to sleep on her back or other side. Her pain is worse with horizontal abduction, lifting anything weighted, and internal rotation.  She has been referred to PT to improve shoulder strengthening and stability. Patient denies numbness or tingling in her hand, but does state a prior history of neck issues.       Past Medical History:   Diagnosis Date    Abnormal EKG     Allergy     seasonal    Anxiety     Breast cyst     Cataract     Fibrocystic breast     GERD (gastroesophageal reflux disease)     History of colonic polyps     1 polyp  --5 yrs.    Hypertension     Hypothyroidism     IBS (irritable bowel syndrome)     Keloid cicatrix     Migraine syndrome     OA (osteoarthritis)     Osteopenia     BMD  --no tx -repeat 2 yrs    Osteoporosis, postmenopausal     Palpitations     SOB (shortness of breath)      Aleksandra Santana  has a past surgical history that includes Tonsillectomy; Appendectomy; Hysterectomy (age 33);  section; and Oophorectomy.    Aleksandra has a current medication list which includes the following  "prescription(s): amlodipine, azelastine, butalbital-acetaminophen-caffeine -40 mg, butalbital-aspirin-caffeine -40 mg, calcium-vitamin d3, carvedilol, celecoxib, fluticasone propionate, folic acid, gabapentin, hydrochlorothiazide, ibandronate, levocetirizine, levothyroxine, meclizine, meloxicam, olmesartan, olopatadine, omeprazole, premarin, promethazine, and valacyclovir.    Review of patient's allergies indicates:   Allergen Reactions    Codeine     Iodine and iodide containing products     Iodinated contrast media      Rash (skin)^  Rash (skin)^    Mobic [meloxicam]     Doxycycline Rash        Imaging, X-ray    Prior Therapy: yes  Social History: she lives with their son (1- story)  Occupation: Dog sitting and administrative assistance)  Prior Level of Function: limited per cervical spine, full use of LUE  Current Level of Function: limited for IR, HAB, and lifting    Pain:  Current 1/10, worst 10/10, best 1/10   Location: left shoulder   Description: Burning and Sharp  Aggravating Factors: Lifting and HAB  Easing Factors: injection and rest    Pts goals: "for pain to go away"     Objective     Observation: patient appears stated age  Posture: L shoulder lower, protracted shoulders and forward head      Passive Range of Motion: Measured in degrees      Shoulder Left Right   Flexion 170 165   Abduction 180 180   ER at 0 NT NT   ER at 90 80 P! 100   IR 70 50       Upper Extremity Strength      Shoulder Left Right   Shoulder flexion: 4+/5 5/5   Shoulder Abduction: 5/5 5/5   Shoulder ER 4+/5 3+/5   Shoulder IR 5/5 5/5       Special Tests:       Shoulder Left   Drop Arm test Negative   Subscaputlaris Lift Off Positive   Hawkin's Kenndy Positive   Neer's Test Positive     Scapular Control/Dyskinesis:    Normal / Subtle / Obvious  Comments    Left  obvious     Right  obvious        Palpation Elbow:  Not tested    Palpation Shoulder:  Not tested      NOT PERFORMED THIS SESSION SECONDARY TO LIMITED " TIME    TREATMENT   Treatment Time In: 200 pm  Treatment Time Out: 205 pm  Total Treatment time separate from Evaluation time:5 min    Aleksandra received therapeutic exercises to develop strength, endurance, ROM and posture for 5 minutes including:  *assess cervical spine next session  Scapular retractions 10 sec x10      Home Exercises and Patient Education Provided    Education provided re: HEP to Go    Written Home Exercises Provided: yes.  Exercises were reviewed and Aleksandra was able to demonstrate them prior to the end of the session.   Pt received a written copy of exercises to perform at home. Aleksandra demonstrated good  understanding of the education provided.     See media section for exercises given.   Assessment   Aleksandra is a 69 y.o. female referred to outpatient Physical Therapy with a medical diagnosis of L shoulder pain. Pt presents with decreased L shoulder ROM, strength and scapular dyskinesia limiting with past cervical issues limiting functional use LUE.    Pt prognosis is Good.   Pt will benefit from skilled outpatient Physical Therapy to address the deficits stated above and in the chart below, provide pt/family education, and to maximize pt's level of independence.     Plan of care discussed with patient: Yes  Pt's spiritual, cultural and educational needs considered and patient is agreeable to the plan of care and goals as stated below:     Anticipated Barriers for therapy: none    Medical Necessity is demonstrated by the following  History  Co-morbidities and personal factors that may impact the plan of care Co-morbidities:   none    Personal Factors:   no deficits     low   Examination  Body Structures and Functions, activity limitations and participation restrictions that may impact the plan of care Body Regions:   upper extremities    Body Systems:    ROM  strength    Participation Restrictions:   none    Activity limitations:   Mobility  lifting and carrying objects    Self care  no  deficits    Domestic Life  no deficits    Interactions/Relationships  no deficits    Life Areas  no deficits    Community and Social Life  no deficits         low   Clinical Presentation stable and uncomplicated low   Decision Making/ Complexity Score: low     Goals:  Short Term Goals: (4 weeks)   - Pt will increase ROM to WNL L shoulder for return to ADLs  - Pt will increase strength to 4/5 L ER  - Decrease Pain to 3/10 as worst with all PT exercises  - Pt to self correct posture with minimal cues  - Pt independent with HEP with progressions.     Long Term Goals (8 Weeks):  - Pt will tolerate working out for 1 hour with 1/10 pain  - Pt will increase strength to 5/5 LUE  - Decrease Pain to 0/10   - Pt to return to 80% PLOF      Plan   Certification Period/Plan of care expiration: 9/30/2019 to 11/25/19.    Outpatient Physical Therapy 2 times weekly for 8 weeks to include the following interventions: Manual Therapy, Moist Heat/ Ice, Neuromuscular Re-ed and Therapeutic Exercise.     Corrine Avila, PT, DPT, COMT

## 2019-10-02 ENCOUNTER — CLINICAL SUPPORT (OUTPATIENT)
Dept: REHABILITATION | Facility: HOSPITAL | Age: 69
End: 2019-10-02
Attending: ORTHOPAEDIC SURGERY
Payer: MEDICARE

## 2019-10-02 DIAGNOSIS — M25.512 ACUTE PAIN OF LEFT SHOULDER: ICD-10-CM

## 2019-10-02 PROCEDURE — 97140 MANUAL THERAPY 1/> REGIONS: CPT | Mod: HCNC

## 2019-10-02 PROCEDURE — 97112 NEUROMUSCULAR REEDUCATION: CPT | Mod: HCNC

## 2019-10-02 PROCEDURE — 97110 THERAPEUTIC EXERCISES: CPT | Mod: HCNC

## 2019-10-02 NOTE — PROGRESS NOTES
Physical Therapy Daily Treatment Note     Name: Aleksandra Santana  Clinic Number: [id number]    Therapy Diagnosis:   Encounter Diagnosis   Name Primary?    Acute pain of left shoulder      Physician: Vernell Alvarez MD    Visit Date: 10/2/2019    Physician Orders: PT Eval and Treat   Medical Diagnosis: M25.512 (ICD-10-CM) - Left shoulder pain, unspecified chronicity  Date of Surgery: none  Evaluation Date: 9/30/2019  Authorization Period Expiration: 12/31/19  Plan of Care Certification Period: 11/25/19  Visit # / Visits authorized: 2/ 20     Time In: 1005 am  Time Out: 1100 am  Total Billable Time: 30 minutes     Precautions: Standard    Subjective      Pt reports:she is feeling the same.   she was compliant with home exercise program given last session.   Response to previous treatment:unable to assess  Functional change: unable to asses    Pain: not quantified this visit  Location: left shoulder      Objective   Measurements this visit: 10/2/19    Cervical ROM:  Flexion: 60 deg  Ext: 50 deg  RSB: 35 deg  LSB: 35 deg  R Rot: 62 deg  L Rot: 50 deg      Aleksandra received therapeutic exercises to develop strength, endurance and posture for 25 minutes including:  Prone pillow shoulder ext with ER and W's  2x10 with 5 sec hold  No money otb 2x15 with 3 sec hold    Aleksandra received the following manual therapy techniques: Joint mobilizations and Manual traction were applied to the: cervical spine for 15 minutes, including:  subocciptal release x2 bouts  Lateral glides grade III  PROM with cervical distraction B    Aleksandra participated in neuromuscular re-education activities to improve: Kinesthetic, Proprioception and Posture for 10 minutes. The following activities were included:  Chin tucks 10 sec x10  Cervical rotations 2x10 with chin tuck        Aleksandra received hot pack for 10 minutes to increase circulation and promote tissue healing.        Home Exercises Provided and Patient Education Provided  "    Education provided:   - HEP to go    Written Home Exercises Provided: Patient instructed to cont prior HEP.  Exercises were reviewed and Aleksandra was able to demonstrate them prior to the end of the session.  Aleksandra demonstrated good  understanding of the education provided.     See EMR under Media for exercises provided {Blank single:47004::"10/2/2019","prior visit"    Assessment     Patient tolerated all cervical stability and scapular strengthening exercises well. She was provided an updated HEP and demonstrated a good understanding.  Aleksandra is progressing well towards her goals.   Pt prognosis is Good.     Pt will continue to benefit from skilled outpatient physical therapy to address the deficits listed in the problem list box on initial evaluation, provide pt/family education and to maximize pt's level of independence in the home and community environment.     Pt's spiritual, cultural and educational needs considered and pt agreeable to plan of care and goals.    Anticipated barriers to physical therapy: none    Goals:   Short Term Goals: (4 weeks)   - Pt will increase ROM to WNL L shoulder for return to ADLs (Progressing, not met)  - Pt will increase strength to 4/5 L ER (Progressing, not met)  - Decrease Pain to 3/10 as worst with all PT exercises (Progressing, not met)  - Pt to self correct posture with minimal cues (Progressing, not met)  - Pt independent with HEP with progressions.  (Progressing, not met)     Long Term Goals (8 Weeks):  - Pt will tolerate working out for 1 hour with 1/10 pain (Progressing, not met)  - Pt will increase strength to 5/5 LUE (Progressing, not met)  - Decrease Pain to 0/10  (Progressing, not met)  - Pt to return to 80% PLOF (Progressing, not met)    Plan     Continue POC per patient tolerance progressing shoulder strengthening and cervical stability.    Corrine Avila, PT     "

## 2019-10-09 ENCOUNTER — TELEPHONE (OUTPATIENT)
Dept: DERMATOLOGY | Facility: CLINIC | Age: 69
End: 2019-10-09

## 2019-10-09 ENCOUNTER — CLINICAL SUPPORT (OUTPATIENT)
Dept: DERMATOLOGY | Facility: CLINIC | Age: 69
End: 2019-10-09
Payer: MEDICARE

## 2019-10-09 DIAGNOSIS — Z48.02 VISIT FOR SUTURE REMOVAL: Primary | ICD-10-CM

## 2019-10-09 PROCEDURE — 99024 PR POST-OP FOLLOW-UP VISIT: ICD-10-PCS | Mod: HCNC,S$GLB,, | Performed by: DERMATOLOGY

## 2019-10-09 PROCEDURE — 99999 PR PBB SHADOW E&M-EST. PATIENT-LVL III: CPT | Mod: PBBFAC,HCNC,,

## 2019-10-09 PROCEDURE — 99024 POSTOP FOLLOW-UP VISIT: CPT | Mod: HCNC,S$GLB,, | Performed by: DERMATOLOGY

## 2019-10-09 PROCEDURE — 99999 PR PBB SHADOW E&M-EST. PATIENT-LVL III: ICD-10-PCS | Mod: PBBFAC,HCNC,,

## 2019-10-09 NOTE — TELEPHONE ENCOUNTER
FINAL PATHOLOGIC DIAGNOSIS  1. Skin, left upper back, punch biopsy:  - SUPERFICIAL TO MID DERMAL TIGHTLY CUFFED PERIVASCULAR LYMPHOCYTIC INFILTRATE (SEE  COMMENT).  MICROSCOPIC DESCRIPTION: Sections show punch biopsy of skin extending to the level of the deep reticular  dermis. The epidermis and the overlying stratum corneum are unremarkable. The dermis is edematous with  minimally increased mucin. There is a superficial to mid dermal fairly brisk and tightly cuffed perivascular  lymphocyte-predominant inflammatory infiltrate with rare neutrophils. PAS stain is negative for fungi. Appropriately  reactive controls were reviewed. Multiple levels were examined.  COMMENT: These histologic features are not entirely specific. The differential diagnosis includes polymorphic  light eruption, tumid lupus, or a deep gyrate erythema. Clinical correlation is advised.    I called Ms. Santana and relayed the above results. I told her it was unusual that the DFA for VZV was positive in this same location, and she states that most, if not all, of the rash is now gone after taking the valtrex. I told her to let me know if the rash recurs and I will investigate further.

## 2019-10-09 NOTE — PROGRESS NOTES
Suture Removal note:  CC: 69 y.o. female patient is here for suture removal.         HPI: Patient is s/p punch biopsy of Rule out shingles vs. contact dermatitis vs. folliculitis vs. other from the left back on 9/25/2019.  Patient reports no problems.    WOUND PE:  Sutures intact.  Wound healing well.  Good approximation of skin edges.  No signs or symptoms of infection.    IMPRESSION: FINAL PATHOLOGIC DIAGNOSIS  1. Skin, left upper back, punch biopsy:  - SUPERFICIAL TO MID DERMAL TIGHTLY CUFFED PERIVASCULAR LYMPHOCYTIC INFILTRATE (SEE  COMMENT).  MICROSCOPIC DESCRIPTION: Sections show punch biopsy of skin extending to the level of the deep reticular  dermis. The epidermis and the overlying stratum corneum are unremarkable. The dermis is edematous with  minimally increased mucin. There is a superficial to mid dermal fairly brisk and tightly cuffed perivascular  lymphocyte-predominant inflammatory infiltrate with rare neutrophils. PAS stain is negative for fungi. Appropriately  reactive controls were reviewed. Multiple levels were examined.  COMMENT: These histologic features are not entirely specific. The differential diagnosis includes polymorphic  light eruption, tumid lupus, or a deep gyrate erythema. Clinical correlation is advised.  Diagnosed by: Billy Mathew M.D.  (Electronically Signed: 2019-10-07 11:08:37)  - margins clear.    PLAN:  Sutures removed today.  Continue wound care.    RTC: PRN

## 2019-10-11 ENCOUNTER — CLINICAL SUPPORT (OUTPATIENT)
Dept: REHABILITATION | Facility: HOSPITAL | Age: 69
End: 2019-10-11
Attending: ORTHOPAEDIC SURGERY
Payer: MEDICARE

## 2019-10-11 DIAGNOSIS — M25.512 ACUTE PAIN OF LEFT SHOULDER: ICD-10-CM

## 2019-10-11 PROCEDURE — 97110 THERAPEUTIC EXERCISES: CPT | Mod: HCNC

## 2019-10-11 NOTE — PROGRESS NOTES
Physical Therapy Daily Treatment Note     Name: Aleksandra Santana  Clinic Number: [id number]    Therapy Diagnosis:   Encounter Diagnosis   Name Primary?    Acute pain of left shoulder      Physician: Vernell Alvarez MD    Visit Date: 10/11/2019    Physician Orders: PT Eval and Treat   Medical Diagnosis: M25.512 (ICD-10-CM) - Left shoulder pain, unspecified chronicity  Date of Surgery: none  Evaluation Date: 9/30/2019  Authorization Period Expiration: 12/31/19  Plan of Care Certification Period: 11/25/19  Visit # / Visits authorized:3/ 20     Time In: 200  pm  Time Out: 300 pm  Total Billable Time: 30 minutes     Precautions: Standard    Subjective      Pt reports:she is feeling the same, but also has not been doing her exercises at home.   she was compliant with home exercise program given last session.   Response to previous treatment:unable to assess  Functional change: unable to asses    Pain: not quantified this visit  Location: left shoulder      Objective   Measurements this visit: 10/2/19    Cervical ROM:  Flexion: 60 deg  Ext: 50 deg  RSB: 35 deg  LSB: 35 deg  R Rot: 62 deg  L Rot: 50 deg      Aleksandra received therapeutic exercises to develop strength, endurance and posture for 45 minutes including:  UBE 3'/3'  Prone pillow shoulder ext with ER and W's  2x10 with 5 sec hold  No money gtb 2x15 with 3 sec hold  SL shoulder ER, flex, abd and hab 2x10 L  Wall clocks ytb x10 B  1/2 foam pec stretch x4 min    Aleksandra received the following manual therapy techniques: Joint mobilizations and Manual traction were applied to the: cervical spine for 0 minutes, including:  subocciptal release x2 bouts  Lateral glides grade III  PROM with cervical distraction B    Aleksandra participated in neuromuscular re-education activities to improve: Kinesthetic, Proprioception and Posture for 5 minutes. The following activities were included:  Chin tucks 20 sec x5  Cervical rotations 2x10 with chin tuck        Aleksandra  "received hot pack for 10 minutes to increase circulation and promote tissue healing.        Home Exercises Provided and Patient Education Provided     Education provided:   - HEP to go    Written Home Exercises Provided: Patient instructed to cont prior HEP.  Exercises were reviewed and Aleksandra was able to demonstrate them prior to the end of the session.  Aleksandra demonstrated good  understanding of the education provided.     See EMR under Media for exercises provided {Blank single:79165::"10/11/2019","prior visit"    Assessment     Patient tolerated all SL and scapular strengthening exercises well. She was provided an updated HEP again this session and demonstrated a good understanding.  Aleksandra is progressing well towards her goals.   Pt prognosis is Good.     Pt will continue to benefit from skilled outpatient physical therapy to address the deficits listed in the problem list box on initial evaluation, provide pt/family education and to maximize pt's level of independence in the home and community environment.     Pt's spiritual, cultural and educational needs considered and pt agreeable to plan of care and goals.    Anticipated barriers to physical therapy: none    Goals:   Short Term Goals: (4 weeks)   - Pt will increase ROM to WNL L shoulder for return to ADLs (Progressing, not met)  - Pt will increase strength to 4/5 L ER (Progressing, not met)  - Decrease Pain to 3/10 as worst with all PT exercises (Progressing, not met)  - Pt to self correct posture with minimal cues (Progressing, not met)  - Pt independent with HEP with progressions.  (Progressing, not met)     Long Term Goals (8 Weeks):  - Pt will tolerate working out for 1 hour with 1/10 pain (Progressing, not met)  - Pt will increase strength to 5/5 LUE (Progressing, not met)  - Decrease Pain to 0/10  (Progressing, not met)  - Pt to return to 80% PLOF (Progressing, not met)    Plan     Continue POC per patient tolerance progressing shoulder strengthening " and cervical stability.    Corrine Avila, PT

## 2019-10-19 DIAGNOSIS — L98.9 DISEASE OF SKIN AND SUBCUTANEOUS TISSUE: ICD-10-CM

## 2019-10-20 RX ORDER — GABAPENTIN 300 MG/1
CAPSULE ORAL
Qty: 90 CAPSULE | Refills: 1 | OUTPATIENT
Start: 2019-10-20

## 2019-10-22 ENCOUNTER — CLINICAL SUPPORT (OUTPATIENT)
Dept: REHABILITATION | Facility: HOSPITAL | Age: 69
End: 2019-10-22
Attending: ORTHOPAEDIC SURGERY
Payer: MEDICARE

## 2019-10-22 DIAGNOSIS — M25.512 ACUTE PAIN OF LEFT SHOULDER: ICD-10-CM

## 2019-10-22 PROCEDURE — 97110 THERAPEUTIC EXERCISES: CPT | Mod: HCNC

## 2019-10-22 NOTE — PROGRESS NOTES
Physical Therapy Daily Treatment Note     Name: Aleksandra Santana  Clinic Number: [id number]    Therapy Diagnosis:   Encounter Diagnosis   Name Primary?    Acute pain of left shoulder      Physician: Vernell Alvarez MD    Visit Date: 10/22/2019    Physician Orders: PT Eval and Treat   Medical Diagnosis: M25.512 (ICD-10-CM) - Left shoulder pain, unspecified chronicity  Date of Surgery: none  Evaluation Date: 9/30/2019  Authorization Period Expiration: 12/31/19  Plan of Care Certification Period: 11/25/19  Visit # / Visits authorized:4/ 20     Time In: 500  pm  Time Out: 600 pm  Total Billable Time: 30 minutes     Precautions: Standard    Subjective      Pt reports:she is feeling the same, but also has not been doing her exercises at home. Pt re-educated on importance of compliance.   she was compliant with home exercise program given last session.   Response to previous treatment:unable to assess  Functional change: unable to asses    Pain: not quantified this visit  Location: left shoulder      Objective   Measurements this visit: 10/2/19    Cervical ROM:  Flexion: 60 deg  Ext: 50 deg  RSB: 35 deg  LSB: 35 deg  R Rot: 62 deg  L Rot: 50 deg      Aleksandra received therapeutic exercises to develop strength, endurance and posture for 55 minutes including:  UBE 4'/4'  Prone pillow shoulder ext with ER and W's  2x10 with 5 sec hold-np  No money gtb 2x15 with 3 sec hold-np  SL shoulder ER, flex, abd and hab 3x10 L 1#  Wall clocks rtb x10 B  Step outs otb 2x10 IR/ER  Standing shoulder flex, scap and abd 3x10 1#  1/2 foam pec stretch x4 min    Aleksandra received the following manual therapy techniques: Joint mobilizations and Manual traction were applied to the: cervical spine for 0 minutes, including:  subocciptal release x2 bouts  Lateral glides grade III  PROM with cervical distraction B    Aleksandra participated in neuromuscular re-education activities to improve: Kinesthetic, Proprioception and Posture for 0  "minutes. The following activities were included:  Chin tucks 20 sec x5  Cervical rotations 2x10 with chin tuck        Aleksandra received hot pack for 10 minutes to increase circulation and promote tissue healing.        Home Exercises Provided and Patient Education Provided     Education provided:   - HEP to go    Written Home Exercises Provided: Patient instructed to cont prior HEP.  Exercises were reviewed and Aleksandra was able to demonstrate them prior to the end of the session.  Aleksandra demonstrated good  understanding of the education provided.     See EMR under Media for exercises provided {Blank single:65671::"10/22/2019","prior visit"    Assessment     Patient tolerated all SL and scapular strengthening exercises well with added resistance. She was provided an updated HEP again this session and demonstrated a good understanding.  Aleksandra is progressing well towards her goals.   Pt prognosis is Good.     Pt will continue to benefit from skilled outpatient physical therapy to address the deficits listed in the problem list box on initial evaluation, provide pt/family education and to maximize pt's level of independence in the home and community environment.     Pt's spiritual, cultural and educational needs considered and pt agreeable to plan of care and goals.    Anticipated barriers to physical therapy: none    Goals:   Short Term Goals: (4 weeks)   - Pt will increase ROM to WNL L shoulder for return to ADLs (Progressing, not met)  - Pt will increase strength to 4/5 L ER (Progressing, not met)  - Decrease Pain to 3/10 as worst with all PT exercises (Progressing, not met)  - Pt to self correct posture with minimal cues (Progressing, not met)  - Pt independent with HEP with progressions.  (Progressing, not met)     Long Term Goals (8 Weeks):  - Pt will tolerate working out for 1 hour with 1/10 pain (Progressing, not met)  - Pt will increase strength to 5/5 LUE (Progressing, not met)  - Decrease Pain to 0/10  " (Progressing, not met)  - Pt to return to 80% PLOF (Progressing, not met)    Plan     Continue POC per patient tolerance progressing shoulder strengthening and cervical stability.    Corrine Avila, PT

## 2019-10-25 RX ORDER — MELOXICAM 15 MG/1
TABLET ORAL
Qty: 30 TABLET | Refills: 0 | Status: SHIPPED | OUTPATIENT
Start: 2019-10-25 | End: 2020-02-17

## 2019-10-29 ENCOUNTER — CLINICAL SUPPORT (OUTPATIENT)
Dept: REHABILITATION | Facility: HOSPITAL | Age: 69
End: 2019-10-29
Attending: ORTHOPAEDIC SURGERY
Payer: MEDICARE

## 2019-10-29 DIAGNOSIS — M25.512 ACUTE PAIN OF LEFT SHOULDER: ICD-10-CM

## 2019-10-29 PROCEDURE — 97110 THERAPEUTIC EXERCISES: CPT | Mod: HCNC

## 2019-10-29 NOTE — PROGRESS NOTES
Physical Therapy Daily Treatment Note     Name: Aleksandra Santana  Clinic Number: [id number]    Therapy Diagnosis:   Encounter Diagnosis   Name Primary?    Acute pain of left shoulder      Physician: Vernell Alvarez MD    Visit Date: 10/29/2019    Physician Orders: PT Eval and Treat   Medical Diagnosis: M25.512 (ICD-10-CM) - Left shoulder pain, unspecified chronicity  Date of Surgery: none  Evaluation Date: 9/30/2019  Authorization Period Expiration: 12/31/19  Plan of Care Certification Period: 11/25/19  Visit # / Visits authorized:5/ 20     Time In: 1110 am  Time Out: 1200 pm  Total Billable Time: 30 minutes     Precautions: Standard    Subjective      Pt reports:she has been better about doing her exercises at home.   she was compliant with home exercise program given last session.   Response to previous treatment:unable to assess  Functional change: unable to asses    Pain: not quantified this visit  Location: left shoulder      Objective   Measurements this visit: 10/2/19    Cervical ROM:  Flexion: 60 deg  Ext: 50 deg  RSB: 35 deg  LSB: 35 deg  R Rot: 62 deg  L Rot: 50 deg      Aleksandra received therapeutic exercises to develop strength, endurance and posture for 55 minutes including:  UBE 4'/4'  Prone pillow shoulder ext with ER and W's  2x10 with 5 sec hold- 1#  No money gtb 2x15 with 3 sec hold-  SL shoulder ER, flex, abd and hab 3x10 L 1#  Wall clocks rtb x10 B  Step outs otb 2x10 IR/ER  Standing shoulder flex, scap and abd 3x10 1#-np  1/2 foam pec stretch x4 min-np    Aleksandra received the following manual therapy techniques: Joint mobilizations and Manual traction were applied to the: cervical spine for 0 minutes, including:  subocciptal release x2 bouts  Lateral glides grade III  PROM with cervical distraction B    Aleksandra participated in neuromuscular re-education activities to improve: Kinesthetic, Proprioception and Posture for 0 minutes. The following activities were included:  Chin tucks 20  "sec x5  Cervical rotations 2x10 with chin tuck        Aleksandra received hot pack for 10 minutes to increase circulation and promote tissue healing.        Home Exercises Provided and Patient Education Provided     Education provided:   - HEP to go    Written Home Exercises Provided: Patient instructed to cont prior HEP.  Exercises were reviewed and Aleksandra was able to demonstrate them prior to the end of the session.  Aleksadnra demonstrated good  understanding of the education provided.     See EMR under Media for exercises provided {Blank single:55684::"10/29/2019","prior visit"    Assessment     Patient tolerated exercises well again this visit and showed better scapular activation. She was provided an updated HEP again this session and demonstrated a good understanding.  Aleksandra is progressing well towards her goals.   Pt prognosis is Good.     Pt will continue to benefit from skilled outpatient physical therapy to address the deficits listed in the problem list box on initial evaluation, provide pt/family education and to maximize pt's level of independence in the home and community environment.     Pt's spiritual, cultural and educational needs considered and pt agreeable to plan of care and goals.    Anticipated barriers to physical therapy: none    Goals:   Short Term Goals: (4 weeks)   - Pt will increase ROM to WNL L shoulder for return to ADLs (Progressing, not met)  - Pt will increase strength to 4/5 L ER (Progressing, not met)  - Decrease Pain to 3/10 as worst with all PT exercises (Progressing, not met)  - Pt to self correct posture with minimal cues (Progressing, not met)  - Pt independent with HEP with progressions.  (Progressing, not met)     Long Term Goals (8 Weeks):  - Pt will tolerate working out for 1 hour with 1/10 pain (Progressing, not met)  - Pt will increase strength to 5/5 LUE (Progressing, not met)  - Decrease Pain to 0/10  (Progressing, not met)  - Pt to return to 80% PLOF (Progressing, not " met)    Plan     Continue POC per patient tolerance progressing shoulder strengthening and cervical stability.    Corrine Avila, PT

## 2019-10-30 ENCOUNTER — PES CALL (OUTPATIENT)
Dept: ADMINISTRATIVE | Facility: CLINIC | Age: 69
End: 2019-10-30

## 2019-10-31 ENCOUNTER — CLINICAL SUPPORT (OUTPATIENT)
Dept: REHABILITATION | Facility: HOSPITAL | Age: 69
End: 2019-10-31
Attending: ORTHOPAEDIC SURGERY
Payer: MEDICARE

## 2019-10-31 DIAGNOSIS — M25.512 ACUTE PAIN OF LEFT SHOULDER: ICD-10-CM

## 2019-10-31 PROCEDURE — 97140 MANUAL THERAPY 1/> REGIONS: CPT | Mod: HCNC

## 2019-10-31 PROCEDURE — 97110 THERAPEUTIC EXERCISES: CPT | Mod: HCNC

## 2019-10-31 NOTE — PROGRESS NOTES
Physical Therapy Daily Treatment Note     Name: Aleksandra Santana  Clinic Number: [id number]    Therapy Diagnosis:   Encounter Diagnosis   Name Primary?    Acute pain of left shoulder      Physician: Vernell Alvarez MD    Visit Date: 10/31/2019    Physician Orders: PT Eval and Treat   Medical Diagnosis: M25.512 (ICD-10-CM) - Left shoulder pain, unspecified chronicity  Date of Surgery: none  Evaluation Date: 9/30/2019  Authorization Period Expiration: 12/31/19  Plan of Care Certification Period: 11/25/19  Visit # / Visits authorized:6/ 20     Time In: 1100 am  Time Out: 1200 pm  Total Billable Time: 30 minutes     Precautions: Standard    Subjective      Pt reports:she has been better about doing her exercises at home, but still feels sharp pain with RTC movements.    she was compliant with home exercise program given last session.   Response to previous treatment:good  Functional change: improved strength    Pain: not quantified this visit  Location: left shoulder      Objective   Measurements this visit: 10/2/19    Cervical ROM:  Flexion: 60 deg  Ext: 50 deg  RSB: 35 deg  LSB: 35 deg  R Rot: 62 deg  L Rot: 50 deg      Aleksandra received therapeutic exercises to develop strength, endurance and posture for 45 minutes including:  UBE 5'/5'  Prone pillow shoulder ext with ER and W's  2x10 with 5 sec hold- 1#-np  No money gtb 2x15 with 3 sec hold-np  SL shoulder ER, flex, abd and hab 3x10 L 1#-np  Wall clocks rtb x15 B  Step outs gtb 2x10 IR/ER  Standing shoulder flex, scap and abd 3x10 1#  1/2 foam pec stretch x4 min-np    Aleksandra received the following manual therapy techniques: Joint mobilizations and Manual traction were applied to the: cervical spine for 10 minutes, including:  PROM shoulder with inferior and AP mobs  Quadrants grade II    Aleksandra participated in neuromuscular re-education activities to improve: Kinesthetic, Proprioception and Posture for 0 minutes. The following activities were  "included:  Chin tucks 20 sec x5  Cervical rotations 2x10 with chin tuck        Aleksandra received hot pack for 10 minutes to increase circulation and promote tissue healing.-np        Home Exercises Provided and Patient Education Provided     Education provided:   - HEP to go    Written Home Exercises Provided: Patient instructed to cont prior HEP.  Exercises were reviewed and Aleksandra was able to demonstrate them prior to the end of the session.  Aleksandra demonstrated good  understanding of the education provided.     See EMR under Media for exercises provided {Blank single:71400::"10/31/2019","prior visit"    Assessment     Patient responded well to manual therapy applied to L shoulder. She is making good progress strengthening wise, but continues to have some RTC impingement symptoms.   Aleksandra is progressing well towards her goals.   Pt prognosis is Good.     Pt will continue to benefit from skilled outpatient physical therapy to address the deficits listed in the problem list box on initial evaluation, provide pt/family education and to maximize pt's level of independence in the home and community environment.     Pt's spiritual, cultural and educational needs considered and pt agreeable to plan of care and goals.    Anticipated barriers to physical therapy: none    Goals:   Short Term Goals: (4 weeks)   - Pt will increase ROM to WNL L shoulder for return to ADLs (Progressing, not met)  - Pt will increase strength to 4/5 L ER (Progressing, not met)  - Decrease Pain to 3/10 as worst with all PT exercises (Progressing, not met)  - Pt to self correct posture with minimal cues (Progressing, not met)  - Pt independent with HEP with progressions.  (Progressing, not met)     Long Term Goals (8 Weeks):  - Pt will tolerate working out for 1 hour with 1/10 pain (Progressing, not met)  - Pt will increase strength to 5/5 LUE (Progressing, not met)  - Decrease Pain to 0/10  (Progressing, not met)  - Pt to return to 80% PLOF " (Progressing, not met)    Plan     Continue POC per patient tolerance progressing shoulder strengthening and cervical stability.    Corrine Avila, PT

## 2019-11-04 RX ORDER — HYDROCHLOROTHIAZIDE 25 MG/1
TABLET ORAL
Qty: 90 TABLET | Refills: 0 | Status: SHIPPED | OUTPATIENT
Start: 2019-11-04 | End: 2020-02-21

## 2019-11-06 ENCOUNTER — CLINICAL SUPPORT (OUTPATIENT)
Dept: REHABILITATION | Facility: HOSPITAL | Age: 69
End: 2019-11-06
Attending: ORTHOPAEDIC SURGERY
Payer: MEDICARE

## 2019-11-06 DIAGNOSIS — M25.512 ACUTE PAIN OF LEFT SHOULDER: ICD-10-CM

## 2019-11-06 PROCEDURE — 97110 THERAPEUTIC EXERCISES: CPT | Mod: HCNC

## 2019-11-06 PROCEDURE — 97140 MANUAL THERAPY 1/> REGIONS: CPT | Mod: HCNC

## 2019-11-06 NOTE — PROGRESS NOTES
Physical Therapy Daily Treatment Note     Name: Aleksandra Santana  Clinic Number: [id number]    Therapy Diagnosis:   Encounter Diagnosis   Name Primary?    Acute pain of left shoulder      Physician: Vernell Alvarez MD    Visit Date: 11/6/2019    Physician Orders: PT Eval and Treat   Medical Diagnosis: M25.512 (ICD-10-CM) - Left shoulder pain, unspecified chronicity  Date of Surgery: none  Evaluation Date: 9/30/2019  Authorization Period Expiration: 12/31/19  Plan of Care Certification Period: 11/25/19  Visit # / Visits authorized: 7/ 20     Time In: 1015 am  Time Out: 1100 am  Total Billable Time: 30 minutes     Precautions: Standard    Subjective      Pt reports:she has been better overall and knows she has gotten stronger since starting PT.    she was compliant with home exercise program given last session.   Response to previous treatment:good  Functional change: improved strength    Pain: not quantified this visit  Location: left shoulder      Objective   Measurements this visit: 10/2/19    Shoulder MMT:  Flexion: 5/5 B  ER: 5/5 B      Aleksandra received therapeutic exercises to develop strength, endurance and posture for 35 minutes including:  UBE 5'/5'  Ball on wall roll up 2x10   Prone pillow shoulder ext with ER and W's  2x10 with 5 sec hold- 1#-np  No money gtb 2x15 with 3 sec hold-np  SL shoulder ER, flex  3x10 L 2#  Wall clocks gtb x15 B  Step outs gtb 3x10 IR/ER  Standing shoulder flex, scap and abd 3x10 2#  1/2 foam pec stretch x4 min-np    Aleksandra received the following manual therapy techniques: Joint mobilizations and Manual traction were applied to the: cervical spine for 10 minutes, including:  PROM shoulder with inferior and AP mobs  Quadrants grade II        Aleksandra received hot pack for 10 minutes to increase circulation and promote tissue healing.-np        Home Exercises Provided and Patient Education Provided     Education provided:   - HEP to go    Written Home Exercises Provided:  "Patient instructed to cont prior HEP.  Exercises were reviewed and Aleksandra was able to demonstrate them prior to the end of the session.  Aleksandra demonstrated good  understanding of the education provided.     See EMR under Media for exercises provided {Madelaine single:08128::"11/6/2019","prior visit"    Assessment     Upon reassessment, patient has shown improved shoulder strength. She is making good progress strengthening wise, but continues to have some RTC impingement symptoms.   Aleksandra is progressing well towards her goals.   Pt prognosis is Good.     Pt will continue to benefit from skilled outpatient physical therapy to address the deficits listed in the problem list box on initial evaluation, provide pt/family education and to maximize pt's level of independence in the home and community environment.     Pt's spiritual, cultural and educational needs considered and pt agreeable to plan of care and goals.    Anticipated barriers to physical therapy: none    Goals:   Short Term Goals: (4 weeks)   - Pt will increase ROM to WNL L shoulder for return to ADLs (met)  - Pt will increase strength to 4/5 L ER ( met)  - Decrease Pain to 3/10 as worst with all PT exercises (Progressing, not met)  - Pt to self correct posture with minimal cues (met)  - Pt independent with HEP with progressions.  ( met)     Long Term Goals (8 Weeks):  - Pt will tolerate working out for 1 hour with 1/10 pain (Progressing, not met)  - Pt will increase strength to 5/5 LUE (Progressing, not met)  - Decrease Pain to 0/10  (Progressing, not met)  - Pt to return to 80% PLOF (Progressing, not met)    Plan     Continue POC per patient tolerance progressing shoulder strengthening and stability.    Corrine Avila, PT   "

## 2019-11-07 ENCOUNTER — PATIENT OUTREACH (OUTPATIENT)
Dept: ADMINISTRATIVE | Facility: OTHER | Age: 69
End: 2019-11-07

## 2019-11-08 ENCOUNTER — CLINICAL SUPPORT (OUTPATIENT)
Dept: REHABILITATION | Facility: HOSPITAL | Age: 69
End: 2019-11-08
Attending: ORTHOPAEDIC SURGERY
Payer: MEDICARE

## 2019-11-08 DIAGNOSIS — M25.512 ACUTE PAIN OF LEFT SHOULDER: ICD-10-CM

## 2019-11-08 PROCEDURE — 97110 THERAPEUTIC EXERCISES: CPT | Mod: HCNC

## 2019-11-08 NOTE — PROGRESS NOTES
Physical Therapy Daily Treatment Note     Name: Aleksandra Santana  Clinic Number: [id number]    Therapy Diagnosis:   Encounter Diagnosis   Name Primary?    Acute pain of left shoulder      Physician: Vernell Alvarez MD    Visit Date: 11/8/2019    Physician Orders: PT Eval and Treat   Medical Diagnosis: M25.512 (ICD-10-CM) - Left shoulder pain, unspecified chronicity  Date of Surgery: none  Evaluation Date: 9/30/2019  Authorization Period Expiration: 12/31/19  Plan of Care Certification Period: 11/25/19  Visit # / Visits authorized: 8/ 20     Time In: 1010 am  Time Out: 1100 am  Total Billable Time: 30 minutes     Precautions: Standard    Subjective      Pt reports:she knows she is stronger, but is still having pain with functional movements.   she was compliant with home exercise program given last session.   Response to previous treatment:good  Functional change: improved strength    Pain: not quantified this visit  Location: left shoulder      Objective   Measurements this visit: 11/8/19    Shoulder MMT:  Flexion: 5/5 B  ER: 5/5 B      Aleksandra received therapeutic exercises to develop strength, endurance and posture for 35 minutes including:  UBE 5'/5'  Ball on wall roll up 2x10   Prone pillow shoulder ext with ER and W's  2x10 with 5 sec hold- 1#-np  No money gtb 2x15 with 3 sec hold-np  SL shoulder ER, flex  3x10 L 2#-np  Wall clocks gtb 2x10 B  Step outs gtb 3x10 IR/ER-np  Standing shoulder flex, scap and abd 3x10 2#  1/2 foam pec stretch x4 min-np    Aleksandra received the following manual therapy techniques: Joint mobilizations and Manual traction were applied to the: cervical spine for 10 minutes, including:  PROM shoulder with inferior and AP mobs  Quadrants grade II        Aleksandra received hot pack for 10 minutes to increase circulation and promote tissue healing.-np        Home Exercises Provided and Patient Education Provided     Education provided:   - HEP to go    Written Home Exercises  "Provided: Patient instructed to cont prior HEP.  Exercises were reviewed and Aleksandra was able to demonstrate them prior to the end of the session.  Aleksandra demonstrated good  understanding of the education provided.     See EMR under Media for exercises provided {Madelaine single:26842::"11/8/2019","prior visit"    Assessment     Upon reassessment, patient has shown improved shoulder strength, but is still limited secondary to pain. She continues to respond well to manual therapy joint mobilization to decrease any capsular stiffness. She is making good progress strengthening wise, but continues to have some RTC impingement symptoms.   Aleksandra is progressing well towards her goals.   Pt prognosis is Good.     Pt will continue to benefit from skilled outpatient physical therapy to address the deficits listed in the problem list box on initial evaluation, provide pt/family education and to maximize pt's level of independence in the home and community environment.     Pt's spiritual, cultural and educational needs considered and pt agreeable to plan of care and goals.    Anticipated barriers to physical therapy: none    Goals:   Short Term Goals: (4 weeks)   - Pt will increase ROM to WNL L shoulder for return to ADLs (met)  - Pt will increase strength to 4/5 L ER ( met)  - Decrease Pain to 3/10 as worst with all PT exercises (Progressing, not met)  - Pt to self correct posture with minimal cues (met)  - Pt independent with HEP with progressions.  ( met)     Long Term Goals (8 Weeks):  - Pt will tolerate working out for 1 hour with 1/10 pain (Progressing, not met)  - Pt will increase strength to 5/5 LUE (met)  - Decrease Pain to 0/10  (Progressing, not met)  - Pt to return to 80% PLOF (Progressing, not met)    Plan     Plan to reassess after follow up with Dr. Alvarez to determine other interventions to decrease L shoulder pain.    Corrine Avila, PT   "

## 2019-11-11 ENCOUNTER — OFFICE VISIT (OUTPATIENT)
Dept: SPORTS MEDICINE | Facility: CLINIC | Age: 69
End: 2019-11-11
Payer: MEDICARE

## 2019-11-11 VITALS
HEART RATE: 62 BPM | BODY MASS INDEX: 23.6 KG/M2 | SYSTOLIC BLOOD PRESSURE: 103 MMHG | WEIGHT: 125 LBS | HEIGHT: 61 IN | DIASTOLIC BLOOD PRESSURE: 70 MMHG

## 2019-11-11 DIAGNOSIS — M75.42 IMPINGEMENT SYNDROME OF LEFT SHOULDER: Primary | ICD-10-CM

## 2019-11-11 PROCEDURE — 99214 OFFICE O/P EST MOD 30 MIN: CPT | Mod: 25,HCNC,S$GLB, | Performed by: ORTHOPAEDIC SURGERY

## 2019-11-11 PROCEDURE — 3074F PR MOST RECENT SYSTOLIC BLOOD PRESSURE < 130 MM HG: ICD-10-PCS | Mod: HCNC,CPTII,S$GLB, | Performed by: ORTHOPAEDIC SURGERY

## 2019-11-11 PROCEDURE — 99214 PR OFFICE/OUTPT VISIT, EST, LEVL IV, 30-39 MIN: ICD-10-PCS | Mod: 25,HCNC,S$GLB, | Performed by: ORTHOPAEDIC SURGERY

## 2019-11-11 PROCEDURE — 99999 PR PBB SHADOW E&M-EST. PATIENT-LVL IV: CPT | Mod: PBBFAC,HCNC,, | Performed by: ORTHOPAEDIC SURGERY

## 2019-11-11 PROCEDURE — 3074F SYST BP LT 130 MM HG: CPT | Mod: HCNC,CPTII,S$GLB, | Performed by: ORTHOPAEDIC SURGERY

## 2019-11-11 PROCEDURE — 1101F PR PT FALLS ASSESS DOC 0-1 FALLS W/OUT INJ PAST YR: ICD-10-PCS | Mod: HCNC,CPTII,S$GLB, | Performed by: ORTHOPAEDIC SURGERY

## 2019-11-11 PROCEDURE — 99999 PR PBB SHADOW E&M-EST. PATIENT-LVL IV: ICD-10-PCS | Mod: PBBFAC,HCNC,, | Performed by: ORTHOPAEDIC SURGERY

## 2019-11-11 PROCEDURE — 3078F PR MOST RECENT DIASTOLIC BLOOD PRESSURE < 80 MM HG: ICD-10-PCS | Mod: HCNC,CPTII,S$GLB, | Performed by: ORTHOPAEDIC SURGERY

## 2019-11-11 PROCEDURE — 20610 DRAIN/INJ JOINT/BURSA W/O US: CPT | Mod: HCNC,LT,S$GLB, | Performed by: ORTHOPAEDIC SURGERY

## 2019-11-11 PROCEDURE — 1101F PT FALLS ASSESS-DOCD LE1/YR: CPT | Mod: HCNC,CPTII,S$GLB, | Performed by: ORTHOPAEDIC SURGERY

## 2019-11-11 PROCEDURE — 20610 LARGE JOINT ASPIRATION/INJECTION: L SUBACROMIAL BURSA: ICD-10-PCS | Mod: HCNC,LT,S$GLB, | Performed by: ORTHOPAEDIC SURGERY

## 2019-11-11 PROCEDURE — 3078F DIAST BP <80 MM HG: CPT | Mod: HCNC,CPTII,S$GLB, | Performed by: ORTHOPAEDIC SURGERY

## 2019-11-11 RX ORDER — TRIAMCINOLONE ACETONIDE 40 MG/ML
80 INJECTION, SUSPENSION INTRA-ARTICULAR; INTRAMUSCULAR
Status: DISCONTINUED | OUTPATIENT
Start: 2019-11-11 | End: 2019-11-11 | Stop reason: HOSPADM

## 2019-11-11 RX ADMIN — TRIAMCINOLONE ACETONIDE 80 MG: 40 INJECTION, SUSPENSION INTRA-ARTICULAR; INTRAMUSCULAR at 01:11

## 2019-11-11 NOTE — PROCEDURES
Large Joint Aspiration/Injection: L subacromial bursa  Date/Time: 11/11/2019 1:00 PM  Performed by: Vernell Alvarez MD  Authorized by: Vernell Alvarez MD     Consent Done?:  Yes (Verbal)  Indications:  Pain  Procedure site marked: Yes    Timeout: Prior to procedure the correct patient, procedure, and site was verified      Location:  Shoulder  Site:  L subacromial bursa  Prep: Patient was prepped and draped in usual sterile fashion    Needle size:  22 G  Approach:  Posterior  Medications:  80 mg triamcinolone acetonide 40 mg/mL  Patient tolerance:  Patient tolerated the procedure well with no immediate complications

## 2019-11-11 NOTE — PROGRESS NOTES
CC: left shoulder pain     Aleksandra Santana is a 69 y.o. female presents for evaluation of her left shoulder.  Her shoulder began hurting 3 weeks ago.  It is not associated any trauma or inciting event.  She woke up with the pain. Her pain radiates to the elbow but not past the elbow to the fingers.  She denies any paresthesias.      Denies any previous shoulder injuries or surgeries.  Her pain is worse with overhead activity and better than rest.    Interval update November 11, 2019:  Patient returns today for follow-up for her left shoulder pain she has been doing physical therapy and progressing well.  She is still complaining of burning pain from her neck radiating down to her elbow.  Her shoulder pain overall has improved but there is still an area in the posterior shoulder that is bothering her.    Review of Systems   Constitution: Negative. Negative for chills, fever and night sweats.   HENT: Negative for congestion and headaches.    Eyes: Negative for blurred vision, left vision loss and right vision loss.   Cardiovascular: Negative for chest pain and syncope.   Respiratory: Negative for cough and shortness of breath.    Endocrine: Negative for polydipsia, polyphagia and polyuria.   Hematologic/Lymphatic: Negative for bleeding problem. Does not bruise/bleed easily.   Skin: Negative for dry skin, itching and rash.   Musculoskeletal: Negative for falls and muscle weakness.   Gastrointestinal: Negative for abdominal pain and bowel incontinence.   Genitourinary: Negative for bladder incontinence and nocturia.   Neurological: Negative for disturbances in coordination, loss of balance and seizures.   Psychiatric/Behavioral: Negative for depression. The patient does not have insomnia.    Allergic/Immunologic: Negative for hives and persistent infections.     PAST MEDICAL HISTORY:   Past Medical History:   Diagnosis Date    Abnormal EKG     Allergy     seasonal    Anxiety     Breast cyst     Cataract      Fibrocystic breast     GERD (gastroesophageal reflux disease)     History of colonic polyps     1 polyp  --5 yrs.    Hypertension     Hypothyroidism     IBS (irritable bowel syndrome)     Keloid cicatrix     Migraine syndrome     OA (osteoarthritis)     Osteopenia     BMD  --no tx -repeat 2 yrs    Osteoporosis, postmenopausal     Palpitations     SOB (shortness of breath)      PAST SURGICAL HISTORY:   Past Surgical History:   Procedure Laterality Date    APPENDECTOMY       SECTION      x2    HYSTERECTOMY  age 33    COLEEN/BSO for endometriosis    OOPHORECTOMY      TONSILLECTOMY       FAMILY HISTORY:   Family History   Problem Relation Age of Onset    Cataracts Mother     No Known Problems Father     Ovarian cancer Sister     No Known Problems Brother     No Known Problems Maternal Aunt     No Known Problems Maternal Uncle     No Known Problems Paternal Aunt     No Known Problems Paternal Uncle     No Known Problems Maternal Grandmother     No Known Problems Maternal Grandfather     No Known Problems Paternal Grandmother     No Known Problems Paternal Grandfather     Colon cancer Neg Hx     Breast cancer Neg Hx     Amblyopia Neg Hx     Blindness Neg Hx     Cancer Neg Hx     Diabetes Neg Hx     Glaucoma Neg Hx     Hypertension Neg Hx     Macular degeneration Neg Hx     Retinal detachment Neg Hx     Strabismus Neg Hx     Stroke Neg Hx     Thyroid disease Neg Hx     Melanoma Neg Hx      SOCIAL HISTORY:   Social History     Socioeconomic History    Marital status:      Spouse name: Not on file    Number of children: Not on file    Years of education: Not on file    Highest education level: Not on file   Occupational History    Not on file   Social Needs    Financial resource strain: Not on file    Food insecurity:     Worry: Not on file     Inability: Not on file    Transportation needs:     Medical: Not on file     Non-medical: Not on file    Tobacco Use    Smoking status: Never Smoker    Smokeless tobacco: Never Used   Substance and Sexual Activity    Alcohol use: Yes     Alcohol/week: 1.0 standard drinks     Types: 1 Glasses of wine per week     Comment: ocasionally    Drug use: No    Sexual activity: Not Currently     Partners: Male     Birth control/protection: None, Post-menopausal   Lifestyle    Physical activity:     Days per week: Not on file     Minutes per session: Not on file    Stress: Not on file   Relationships    Social connections:     Talks on phone: Not on file     Gets together: Not on file     Attends Restoration service: Not on file     Active member of club or organization: Not on file     Attends meetings of clubs or organizations: Not on file     Relationship status: Not on file   Other Topics Concern    Are you pregnant or think you may be? Not Asked    Breast-feeding Not Asked   Social History Narrative    Not on file       MEDICATIONS:   Current Outpatient Medications:     amLODIPine (NORVASC) 5 MG tablet, Take 1 tablet (5 mg total) by mouth once daily., Disp: 90 tablet, Rfl: 11    azelastine (ASTELIN) 137 mcg (0.1 %) nasal spray, INHALE 2 SPRAYS IN EACH NOSTRIL TWICE DAILY, Disp: 30 mL, Rfl: 12    butalbital-acetaminophen-caffeine -40 mg (FIORICET, ESGIC) -40 mg per tablet, Take 1 tablet by mouth every 6 (six) hours as needed., Disp: 30 tablet, Rfl: 0    butalbital-aspirin-caffeine -40 mg (FIORINAL) -40 mg Cap, Take 1 capsule by mouth., Disp: , Rfl:     calcium-vitamin D tablet 600 mg-200 units, Take 2 tablets by mouth once., Disp: , Rfl:     carvedilol (COREG) 25 MG tablet, TAKE ONE TABLET BY MOUTH EVERY DAY, Disp: 30 tablet, Rfl: 12    fluticasone propionate (FLONASE) 50 mcg/actuation nasal spray, INHALE 2 SPRAYS IN EACH NOSTRIL ONCE DAILY, Disp: 16 g, Rfl: 12    folic acid (FOLVITE) 1 MG tablet, TAKE ONE TABLET BY MOUTH EVERY DAY, Disp: 90 tablet, Rfl: 12    hydroCHLOROthiazide  (HYDRODIURIL) 25 MG tablet, TAKE ONE Tablet BY MOUTH once DAILY, Disp: 90 tablet, Rfl: 0    levocetirizine (XYZAL) 5 MG tablet, TAKE ONE TABLET BY MOUTH EVERY DAY, Disp: 30 tablet, Rfl: 12    levothyroxine (SYNTHROID) 88 MCG tablet, Take 1 tablet (88 mcg total) by mouth once daily., Disp: 30 tablet, Rfl: 6    meclizine (ANTIVERT) 25 mg tablet, Take 1 tablet (25 mg total) by mouth 3 (three) times daily as needed for Dizziness or Nausea., Disp: 30 tablet, Rfl: 0    meloxicam (MOBIC) 15 MG tablet, TAKE 1 TABLET BY MOUTH EVERY DAY, Disp: 30 tablet, Rfl: 0    olmesartan (BENICAR) 40 MG tablet, Take 1 tablet (40 mg total) by mouth once daily., Disp: 90 tablet, Rfl: 3    PREMARIN 1.25 mg tablet, TAKE ONE TABLET BY MOUTH EVERY DAY, Disp: 30 tablet, Rfl: 3    celecoxib (CELEBREX) 200 MG capsule, TAKE ONE CAPSULE BY MOUTH TWICE DAILY (Patient not taking: Reported on 11/11/2019), Disp: 60 capsule, Rfl: 1    gabapentin (NEURONTIN) 300 MG capsule, Take 1 po x 1 day, then 1 po BID x 1 day, then 1 po TID. When discontinuing, decrease to 1 po BID x 4 days then 1 po qd x 4 days then stop (Patient not taking: Reported on 11/11/2019), Disp: 90 capsule, Rfl: 1    ibandronate (BONIVA) 150 mg tablet, Take 1 tablet (150 mg total) by mouth every 30 days., Disp: 1 tablet, Rfl: 11    olopatadine (PATANOL) 0.1 % ophthalmic solution, Place 1 drop into both eyes 2 (two) times daily. (Patient not taking: Reported on 11/11/2019), Disp: 5 mL, Rfl: 5    omeprazole (PRILOSEC) 20 MG capsule, Take 1 capsule (20 mg total) by mouth before breakfast. (Patient not taking: Reported on 11/11/2019), Disp: 90 capsule, Rfl: 3    promethazine (PHENERGAN) 12.5 MG Tab, TAKE ONE Tablet BY MOUTH EVERY 6 HOURS AS NEEDED (Patient not taking: Reported on 11/11/2019), Disp: 15 tablet, Rfl: 2    valACYclovir (VALTREX) 1000 MG tablet, Take 1 tab po TID x 7 days (Patient not taking: Reported on 11/11/2019), Disp: 21 tablet, Rfl: 0  ALLERGIES:   Review of  "patient's allergies indicates:   Allergen Reactions    Codeine     Iodine and iodide containing products     Iodinated contrast media      Rash (skin)^  Rash (skin)^    Mobic [meloxicam]     Doxycycline Rash       VITAL SIGNS: /70   Pulse 62   Ht 5' 1" (1.549 m)   Wt 56.7 kg (125 lb)   BMI 23.62 kg/m²      PHYSICAL EXAMINATION:  General:  The patient is alert and oriented x 3.  Mood is pleasant.  Observation of ears, eyes and nose reveal no gross abnormalities.  No labored breathing observed.  Gait is coordinated. Patient can toe walk and heel walk without difficulty.      left Shoulder / Upper Extremity Exam    OBSERVATION:     Swelling  none  Deformity  none   Discoloration  none   Scapular winging none   Scars   none  Atrophy  none    TENDERNESS / CREPITUS (T/C):          T/C      T/C   Clavicle   -/-  SUPRAspinatus    -/-     AC Jt.    -/-  INFRAspinatus  -/-    SC Jt.    -/-  Deltoid    -/-      G. Tuberosity  -/-  LH BICEP groove  +/-   Acromion:  -/-  Midline Neck   -/-     Scapular Spine -/-  Trapezium   -/-   SMA Scapula  -/-  GH jt. line - post  -/-     Scapulothoracic  -/-         ROM: (* = with pain)  Right shoulder   Left shoulder        AROM (PROM)   AROM (PROM)   FE    170° (175°)     170° (175°)     ER at 0°    60°  (65°)    60°  (65°)   ER at 90° ABD  90°  (90°)    90°  (90°)   IR at 90°  ABD   NA  (40°)     NA  (40°)      IR (spine level)   T10     T10    STRENGTH: (* = with pain) RIGHT SHOULDER  LEFT SHOULDER   SCAPTION at 0  5/5    5/5   SCAPTION at 30  5/5    5/5    IR    5/5    5/5   ER    5/5    5/5   BICEPS   5/5    5/5   Deltoid    5/5    5/5     SIGNS:  Painful side       NEER   +    ORAYOS  neg    LEMON   +    SPEEDS  neg     DROP ARM   neg   BELLY PRESS neg   Superior escape none    LIFT-OFF  neg   X-Body ADD    neg    MOVING VALGUS neg        STABILITY TESTING    RIGHT SHOULDER   LEFT SHOULDER     Translation     Anterior  up face     up face    Posterior  up " face    up face    Sulcus   < 10mm    < 10 mm     Signs   Apprehension   neg      neg       Relocation   no change     no change      Jerk test  neg     neg    EXTREMITY NEURO-VASCULAR EXAM    Sensation grossly intact to light touch all dermatomal regions.    DTR 2+ Biceps, Triceps, BR and Negative Primos sign   Grossly intact motor function at Elbow, Wrist and Hand   Distal pulses radial and ulnar 2+, brisk cap refill, symmetric.      NECK:  Painless FROM and spinous processes non-tender. Negative Spurlings sign.      OTHER FINDINGS:  + scapular dyskinesia    XRAYS:  Shoulder trauma series left,  were obtained and reviewed  No convincing fracture or dislocation is noted. The osseous structures appear well mineralized and well aligned            ASSESSMENT:   left:  1. Shoulder pain, impingement   2.  Scapular dyskinesia    PLAN:      1.  Patient overall improving she was still having some pain so we did a subacromial bursa steroid injection  2.  For her burning neck pain we will refer her to Dr. Herrera   3.  Continue physical therapy      PROCEDURE NOTE:   After cortisone consent and post-injection information was given, the shoulder was prepped with betadyne and alcohol. The left subacromial space of the shoulder was injected with 2 cc 40 mg kenalog and 4 cc lidocaine and 4 cc .5% marcaine.   Bandaid was applied. Patient was reminded to rest with RICE for 48 hours post injection and to call clinic immediately for any adverse side effects as explained in clinic today.

## 2019-11-21 ENCOUNTER — CLINICAL SUPPORT (OUTPATIENT)
Dept: REHABILITATION | Facility: HOSPITAL | Age: 69
End: 2019-11-21
Attending: ORTHOPAEDIC SURGERY
Payer: MEDICARE

## 2019-11-21 ENCOUNTER — TELEPHONE (OUTPATIENT)
Dept: ORTHOPEDICS | Facility: CLINIC | Age: 69
End: 2019-11-21

## 2019-11-21 DIAGNOSIS — M50.30 DDD (DEGENERATIVE DISC DISEASE), CERVICAL: Primary | ICD-10-CM

## 2019-11-21 DIAGNOSIS — M25.512 ACUTE PAIN OF LEFT SHOULDER: ICD-10-CM

## 2019-11-21 PROCEDURE — 97110 THERAPEUTIC EXERCISES: CPT | Mod: HCNC

## 2019-11-21 NOTE — PROGRESS NOTES
Physical Therapy Daily Treatment Note     Name: Aleksandra Santana  Clinic Number: [id number]    Therapy Diagnosis:   Encounter Diagnosis   Name Primary?    Acute pain of left shoulder      Physician: Vernell Alvarez MD    Visit Date: 11/21/2019    Physician Orders: PT Eval and Treat   Medical Diagnosis: M25.512 (ICD-10-CM) - Left shoulder pain, unspecified chronicity  Date of Surgery: none  Evaluation Date: 9/30/2019  Authorization Period Expiration: 12/31/19  Plan of Care Certification Period: 11/25/19  Visit # / Visits authorized: 9/ 20     Time In: 415 pm  Time Out: 500 pm  Total Billable Time: 30 minutes     Precautions: Standard    Subjective      Pt reports:she knows she is stronger, but is still having pain with functional movements.   she was compliant with home exercise program given last session.   Response to previous treatment:good  Functional change: improved strength    Pain: not quantified this visit  Location: left shoulder      Objective   Measurements this visit: 11/8/19    Shoulder MMT:  Flexion: 5/5 B  ER: 5/5 B      Aleksandra received therapeutic exercises to develop strength, endurance and posture for 35 minutes including:  UBE 5' forward R 4.5  Ball on wall roll up 2x10 -np  Prone pillow shoulder ext with ER and W's  2x10 with 5 sec hold- 1#-np  No money gtb 2x15 with 3 sec hold-np  SL shoulder ER (3#), flex (2#) and scaption 3x10 L   Wall clocks gtb 2x10 B  IR/ER btb 2x15 L  Standing shoulder flex, scap and abd 3x10 2-3#  1/2 foam pec stretch x4 min-np    Aleksandra received the following manual therapy techniques: Joint mobilizations and Manual traction were applied to the: cervical spine for 10 minutes, including:  PROM shoulder with inferior and AP mobs  Quadrants grade II        Aleksandra received hot pack for 10 minutes to increase circulation and promote tissue healing.-np        Home Exercises Provided and Patient Education Provided     Education provided:   - HEP to go    Written  "Home Exercises Provided: Patient instructed to cont prior HEP.  Exercises were reviewed and Aleksandra was able to demonstrate them prior to the end of the session.  Aleksandra demonstrated good  understanding of the education provided.     See EMR under Media for exercises provided {Blank single:41300::"11/21/2019","prior visit"    Assessment     Patient not showing much motivation this session to progress strengthening.  She continues to respond well to manual therapy joint mobilization to decrease any capsular stiffness. She is making good progress strengthening wise, but continues to have some RTC impingement symptoms.   Aleksandra is progressing well towards her goals.   Pt prognosis is Good.     Pt will continue to benefit from skilled outpatient physical therapy to address the deficits listed in the problem list box on initial evaluation, provide pt/family education and to maximize pt's level of independence in the home and community environment.     Pt's spiritual, cultural and educational needs considered and pt agreeable to plan of care and goals.    Anticipated barriers to physical therapy: none    Goals:   Short Term Goals: (4 weeks)   - Pt will increase ROM to WNL L shoulder for return to ADLs (met)  - Pt will increase strength to 4/5 L ER ( met)  - Decrease Pain to 3/10 as worst with all PT exercises (Progressing, not met)  - Pt to self correct posture with minimal cues (met)  - Pt independent with HEP with progressions.  ( met)     Long Term Goals (8 Weeks):  - Pt will tolerate working out for 1 hour with 1/10 pain (Progressing, not met)  - Pt will increase strength to 5/5 LUE (met)  - Decrease Pain to 0/10  (Progressing, not met)  - Pt to return to 80% PLOF (Progressing, not met)    Plan     Plan to reassess after follow up with Dr. Alvarez to determine other interventions to decrease L shoulder pain.    Corrine Avila, PT   "

## 2019-11-27 ENCOUNTER — CLINICAL SUPPORT (OUTPATIENT)
Dept: REHABILITATION | Facility: HOSPITAL | Age: 69
End: 2019-11-27
Attending: ORTHOPAEDIC SURGERY
Payer: MEDICARE

## 2019-11-27 DIAGNOSIS — M25.512 ACUTE PAIN OF LEFT SHOULDER: ICD-10-CM

## 2019-11-27 PROCEDURE — 97110 THERAPEUTIC EXERCISES: CPT | Mod: HCNC

## 2019-11-27 PROCEDURE — 97140 MANUAL THERAPY 1/> REGIONS: CPT | Mod: HCNC

## 2019-11-27 NOTE — PROGRESS NOTES
Physical Therapy Daily Treatment Note     Name: Aleksandra Santana  Clinic Number: [id number]    Therapy Diagnosis:   Encounter Diagnosis   Name Primary?    Acute pain of left shoulder      Physician: Vernell Alvarez MD    Visit Date: 11/27/2019    Physician Orders: PT Eval and Treat   Medical Diagnosis: M25.512 (ICD-10-CM) - Left shoulder pain, unspecified chronicity  Date of Surgery: none  Evaluation Date: 9/30/2019  Authorization Period Expiration: 12/31/19  Plan of Care Certification Period: 12/25/19  Visit # / Visits authorized: 10/ 20     Time In: 200 pm  Time Out: 255 pm  Total Billable Time: 55 minutes     Precautions: Standard    Subjective      Pt reports:she is just tired today.   she was compliant with home exercise program given last session.   Response to previous treatment:good  Functional change: improved strength    Pain: not quantified this visit  Location: left shoulder      Objective   Measurements this visit: 11/8/19    Shoulder MMT:  Flexion: 5/5 B  ER: 5/5 B      Aleksandra received therapeutic exercises to develop strength, endurance and posture for 45 minutes including:  UBE 5' forward R 4.5  Ball on wall roll up 2x10   Prone pillow shoulder ext with ER and W's  2x10 with 5 sec hold- 1#-np  No money gtb 2x15 with 3 sec hold-np  SL shoulder ER (3#), flex (2#) and scaption 3x10 L   Wall clocks gtb 2x10 B  IR/ER btb 2x15 L  Standing shoulder flex, scap and abd 3x10 2-3#  1/2 foam pec stretch x4 min-np    Aleksandra received the following manual therapy techniques: Joint mobilizations and Manual traction were applied to the: cervical spine for 10 minutes, including:  PROM shoulder with inferior and AP mobs  Quadrants grade II        Aleksandra received hot pack for 10 minutes to increase circulation and promote tissue healing.-np        Home Exercises Provided and Patient Education Provided     Education provided:   - HEP to go    Written Home Exercises Provided: Patient instructed to cont  "prior HEP.  Exercises were reviewed and Aleksandra was able to demonstrate them prior to the end of the session.  Aleksandra demonstrated good  understanding of the education provided.     See EMR under Media for exercises provided {Madelaine single:88313::"11/27/2019","prior visit"    Assessment     Patient demonstrated good form with exercises this session requiring little cues. She continues to respond well to manual therapy joint mobilization to decrease any capsular stiffness. She is making good progress strengthening wise, but continues to have some RTC impingement symptoms.   Aleksandra is progressing well towards her goals.   Pt prognosis is Good.     Pt will continue to benefit from skilled outpatient physical therapy to address the deficits listed in the problem list box on initial evaluation, provide pt/family education and to maximize pt's level of independence in the home and community environment.     Pt's spiritual, cultural and educational needs considered and pt agreeable to plan of care and goals.    Anticipated barriers to physical therapy: none    Goals:   Short Term Goals: (4 weeks)   - Pt will increase ROM to WNL L shoulder for return to ADLs (met)  - Pt will increase strength to 4/5 L ER ( met)  - Decrease Pain to 3/10 as worst with all PT exercises (Progressing, not met)  - Pt to self correct posture with minimal cues (met)  - Pt independent with HEP with progressions.  ( met)     Long Term Goals (8 Weeks):  - Pt will tolerate working out for 1 hour with 1/10 pain (Progressing, not met)  - Pt will increase strength to 5/5 LUE (met)  - Decrease Pain to 0/10  (Progressing, not met)  - Pt to return to 80% PLOF (Progressing, not met)    Plan     Continue POC per patient tolerance progressing shoulder strength.     Corrine Avila, PT   "

## 2019-12-03 RX ORDER — CELECOXIB 200 MG/1
CAPSULE ORAL
Qty: 60 CAPSULE | Refills: 1 | OUTPATIENT
Start: 2019-12-03

## 2019-12-14 DIAGNOSIS — E03.9 HYPOTHYROIDISM, UNSPECIFIED TYPE: ICD-10-CM

## 2019-12-15 RX ORDER — LEVOTHYROXINE SODIUM 88 UG/1
TABLET ORAL
Qty: 30 TABLET | Refills: 3 | Status: SHIPPED | OUTPATIENT
Start: 2019-12-15 | End: 2020-03-15

## 2019-12-26 ENCOUNTER — CLINICAL SUPPORT (OUTPATIENT)
Dept: REHABILITATION | Facility: HOSPITAL | Age: 69
End: 2019-12-26
Attending: ORTHOPAEDIC SURGERY
Payer: MEDICARE

## 2019-12-26 DIAGNOSIS — M25.512 ACUTE PAIN OF LEFT SHOULDER: ICD-10-CM

## 2019-12-26 PROCEDURE — 97110 THERAPEUTIC EXERCISES: CPT | Mod: HCNC

## 2019-12-26 PROCEDURE — 97140 MANUAL THERAPY 1/> REGIONS: CPT | Mod: HCNC

## 2019-12-26 NOTE — PROGRESS NOTES
Physical Therapy Daily Treatment Note     Name: Aleksandra Santana  Clinic Number: [id number]    Therapy Diagnosis:   Encounter Diagnosis   Name Primary?    Acute pain of left shoulder      Physician: Vernell Alvarez MD    Visit Date: 12/26/2019    Physician Orders: PT Eval and Treat   Medical Diagnosis: M25.512 (ICD-10-CM) - Left shoulder pain, unspecified chronicity  Date of Surgery: none  Evaluation Date: 9/30/2019  Authorization Period Expiration: 12/31/19  Plan of Care Certification Period: 12/25/19  Visit # / Visits authorized: 11/ 20     Time In: 400 pm  Time Out: 500 pm  Total Billable Time: 30 minutes     Precautions: Standard    Subjective      Pt reports: she was feeling good all week until her shoulder started hurting trying to put her jacket on this morning   she was compliant with home exercise program given last session.   Response to previous treatment:good  Functional change: improved strength    Pain: not quantified this visit  Location: left shoulder      Objective   Measurements this visit: 11/8/19    Shoulder MMT:  Flexion: 5/5 B  ER: 5/5 B      Aleksandra received therapeutic exercises to develop strength, endurance and posture for 50 minutes including:  UBE 5' forward R 4.5  Ball on wall roll up 2x10 - np  Prone pillow shoulder ext with ER and W's  2x10 with 5 sec hold- 1#-np  No money gtb 2x15 with 3 sec hold  SL shoulder ER (3#), flex (2#) and scaption 3x10 L   Wall clocks gtb 2x10 B  IR/ER btb 2x15 L  Standing shoulder flex, scap and abd 3x10 2-3#  1/2 foam pec stretch x4 min-np   Ball on wall up/down/side/cw/ccw 30x ea    Aleksandra received the following manual therapy techniques: Joint mobilizations and Manual traction were applied to the: L shoulder for 10 minutes, including:  PROM shoulder with inferior and AP mobs  Quadrants grade II        Aleksandra received hot pack for 00 minutes to increase circulation and promote tissue healing.-np        Home Exercises Provided and Patient  "Education Provided     Education provided:   - HEP to go    Written Home Exercises Provided: Patient instructed to cont prior HEP.  Exercises were reviewed and Aleksandra was able to demonstrate them prior to the end of the session.  Aleksandra demonstrated good  understanding of the education provided.     See EMR under Media for exercises provided {Madelaine single:77853::"12/26/2019","prior visit"    Assessment     Pt exhibited improved endurance to shoulder stabilization exercises with minimal complaints of pain/fatigue. Pt demonstrated decreased pain/stiffness following manual GH jt mobilizations.   Aleksandra is progressing well towards her goals.   Pt prognosis is Good.     Pt will continue to benefit from skilled outpatient physical therapy to address the deficits listed in the problem list box on initial evaluation, provide pt/family education and to maximize pt's level of independence in the home and community environment.     Pt's spiritual, cultural and educational needs considered and pt agreeable to plan of care and goals.    Anticipated barriers to physical therapy: none    Goals:   Short Term Goals: (4 weeks)   - Pt will increase ROM to WNL L shoulder for return to ADLs (met)  - Pt will increase strength to 4/5 L ER ( met)  - Decrease Pain to 3/10 as worst with all PT exercises ( met)  - Pt to self correct posture with minimal cues (met)  - Pt independent with HEP with progressions.  ( met)     Long Term Goals (8 Weeks):  - Pt will tolerate working out for 1 hour with 1/10 pain (Progressing, not met)  - Pt will increase strength to 5/5 LUE (met)  - Decrease Pain to 0/10  (Progressing, not met)  - Pt to return to 80% PLOF (Progressing, not met)    Plan     Continue POC per patient tolerance progressing shoulder strength.     Corrine Avila, PT   "

## 2020-02-13 ENCOUNTER — PATIENT OUTREACH (OUTPATIENT)
Dept: ADMINISTRATIVE | Facility: HOSPITAL | Age: 70
End: 2020-02-13

## 2020-02-16 RX ORDER — ESTROGENS, CONJUGATED 1.25 MG
TABLET ORAL
Qty: 30 TABLET | Refills: 3 | Status: SHIPPED | OUTPATIENT
Start: 2020-02-16 | End: 2020-08-03

## 2020-02-17 RX ORDER — CELECOXIB 200 MG/1
CAPSULE ORAL
Qty: 60 CAPSULE | Refills: 1 | Status: SHIPPED | OUTPATIENT
Start: 2020-02-17 | End: 2020-07-15

## 2020-02-21 RX ORDER — HYDROCHLOROTHIAZIDE 25 MG/1
TABLET ORAL
Qty: 90 TABLET | Refills: 0 | Status: SHIPPED | OUTPATIENT
Start: 2020-02-21 | End: 2020-05-14

## 2020-03-03 NOTE — TELEPHONE ENCOUNTER
----- Message from Yamileth Huston sent at 3/3/2020  2:24 PM CST -----  Type: Patient Call Back    Who called: pt     What is the request in detail: pt calling to check on the status of med request for ---carvedilol (COREG) 25 MG tablet--- pt states pharmacy has been tried to send request 3 times.     Can the clinic reply by [facility]? No     Would the patient rather a call back or a response via [facility]? Call back     Best call back number: [phone]    Additional Information:

## 2020-03-04 RX ORDER — CARVEDILOL 25 MG/1
TABLET ORAL
Qty: 90 TABLET | Refills: 8 | Status: SHIPPED | OUTPATIENT
Start: 2020-03-04 | End: 2021-05-31

## 2020-03-11 RX ORDER — OLMESARTAN MEDOXOMIL 40 MG/1
TABLET ORAL
Qty: 30 TABLET | Refills: 1 | Status: SHIPPED | OUTPATIENT
Start: 2020-03-11 | End: 2020-04-06

## 2020-03-11 RX ORDER — AMLODIPINE BESYLATE 5 MG/1
TABLET ORAL
Qty: 90 TABLET | Refills: 11 | Status: SHIPPED | OUTPATIENT
Start: 2020-03-11 | End: 2021-06-01

## 2020-03-14 DIAGNOSIS — E03.9 HYPOTHYROIDISM, UNSPECIFIED TYPE: ICD-10-CM

## 2020-03-15 RX ORDER — LEVOTHYROXINE SODIUM 88 UG/1
TABLET ORAL
Qty: 30 TABLET | Refills: 3 | Status: SHIPPED | OUTPATIENT
Start: 2020-03-15 | End: 2020-06-26

## 2020-04-06 RX ORDER — OLMESARTAN MEDOXOMIL 40 MG/1
TABLET ORAL
Qty: 30 TABLET | Refills: 1 | Status: SHIPPED | OUTPATIENT
Start: 2020-04-06 | End: 2020-05-04

## 2020-04-29 ENCOUNTER — HOSPITAL ENCOUNTER (EMERGENCY)
Facility: HOSPITAL | Age: 70
Discharge: HOME | End: 2020-04-29
Attending: EMERGENCY MEDICINE
Payer: MEDICARE

## 2020-04-29 VITALS
OXYGEN SATURATION: 99 % | DIASTOLIC BLOOD PRESSURE: 66 MMHG | WEIGHT: 132 LBS | TEMPERATURE: 98 F | BODY MASS INDEX: 24.92 KG/M2 | HEART RATE: 65 BPM | SYSTOLIC BLOOD PRESSURE: 151 MMHG | RESPIRATION RATE: 18 BRPM | HEIGHT: 61 IN

## 2020-04-29 DIAGNOSIS — S61.451A CAT BITE OF RIGHT HAND, INITIAL ENCOUNTER: Primary | ICD-10-CM
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
## 2020-04-29 PROCEDURE — 63600175 PHARM REV CODE 636 W HCPCS: Mod: HCNC | Performed by: PHYSICIAN ASSISTANT

## 2020-04-29 PROCEDURE — 90471 IMMUNIZATION ADMIN: CPT | Mod: HCNC | Performed by: PHYSICIAN ASSISTANT

## 2020-04-29 PROCEDURE — 99284 EMERGENCY DEPT VISIT MOD MDM: CPT | Mod: 25,HCNC

## 2020-04-29 PROCEDURE — 90715 TDAP VACCINE 7 YRS/> IM: CPT | Mod: HCNC | Performed by: PHYSICIAN ASSISTANT

## 2020-04-29 PROCEDURE — 25000003 PHARM REV CODE 250: Mod: HCNC | Performed by: PHYSICIAN ASSISTANT

## 2020-04-29 RX ORDER — AMOXICILLIN AND CLAVULANATE POTASSIUM 875; 125 MG/1; MG/1
1 TABLET, FILM COATED ORAL
Status: COMPLETED | OUTPATIENT
Start: 2020-04-29 | End: 2020-04-29

## 2020-04-29 RX ORDER — AMOXICILLIN AND CLAVULANATE POTASSIUM 875; 125 MG/1; MG/1
1 TABLET, FILM COATED ORAL 2 TIMES DAILY
Qty: 20 TABLET | Refills: 0 | Status: SHIPPED | OUTPATIENT
Start: 2020-04-29 | End: 2020-05-09

## 2020-04-29 RX ORDER — MUPIROCIN 20 MG/G
OINTMENT TOPICAL 3 TIMES DAILY
Qty: 1 TUBE | Refills: 0 | Status: SHIPPED | OUTPATIENT
Start: 2020-04-29 | End: 2020-05-09

## 2020-04-29 RX ORDER — ACETAMINOPHEN 325 MG/1
650 TABLET ORAL
Status: COMPLETED | OUTPATIENT
Start: 2020-04-29 | End: 2020-04-29

## 2020-04-29 RX ADMIN — AMOXICILLIN AND CLAVULANATE POTASSIUM 1 TABLET: 875; 125 TABLET, FILM COATED ORAL at 06:04

## 2020-04-29 RX ADMIN — CLOSTRIDIUM TETANI TOXOID ANTIGEN (FORMALDEHYDE INACTIVATED), CORYNEBACTERIUM DIPHTHERIAE TOXOID ANTIGEN (FORMALDEHYDE INACTIVATED), BORDETELLA PERTUSSIS TOXOID ANTIGEN (GLUTARALDEHYDE INACTIVATED), BORDETELLA PERTUSSIS FILAMENTOUS HEMAGGLUTININ ANTIGEN (FORMALDEHYDE INACTIVATED), BORDETELLA PERTUSSIS PERTACTIN ANTIGEN, AND BORDETELLA PERTUSSIS FIMBRIAE 2/3 ANTIGEN 0.5 ML: 5; 2; 2.5; 5; 3; 5 INJECTION, SUSPENSION INTRAMUSCULAR at 06:04

## 2020-04-29 RX ADMIN — ACETAMINOPHEN 650 MG: 325 TABLET ORAL at 06:04

## 2020-04-29 NOTE — ED TRIAGE NOTES
Pt reports her neighbor's cat bit her RIGHT hand 2 hours ago. Puncture marks and swelling noted. Bleeding controlled. pain is 5/10

## 2020-04-29 NOTE — ED PROVIDER NOTES
Encounter Date: 2020       History     Chief Complaint   Patient presents with    Animal Bite     Pt reports her neighbor's cat bit her RIGHT hand 2 hours ago. Puncture marks and swelling noted. Bleeding controlled. pain is 5/10     69-year-old female with history of anxiety presents to the emergency department for cat bite to the right hand along the dorsal surface.  CT is on with the neighbor has rabies vaccine.  Denies other injury, fever, numbness, purulent drainage.  No medications taken prior to arrival for symptoms.    The history is provided by the patient.     Review of patient's allergies indicates:   Allergen Reactions    Codeine     Iodine and iodide containing products     Iodinated contrast media      Rash (skin)^  Rash (skin)^    Mobic [meloxicam]     Doxycycline Rash     Past Medical History:   Diagnosis Date    Abnormal EKG     Allergy     seasonal    Anxiety     Breast cyst     Cataract     Fibrocystic breast     GERD (gastroesophageal reflux disease)     History of colonic polyps     1 polyp  --5 yrs.    Hypertension     Hypothyroidism     IBS (irritable bowel syndrome)     Keloid cicatrix     Migraine syndrome     OA (osteoarthritis)     Osteopenia     BMD  --no tx -repeat 2 yrs    Osteoporosis, postmenopausal     Palpitations     SOB (shortness of breath)      Past Surgical History:   Procedure Laterality Date    APPENDECTOMY       SECTION      x2    HYSTERECTOMY  age 33    COLEEN/BSO for endometriosis    OOPHORECTOMY      TONSILLECTOMY       Family History   Problem Relation Age of Onset    Cataracts Mother     No Known Problems Father     Ovarian cancer Sister     No Known Problems Brother     No Known Problems Maternal Aunt     No Known Problems Maternal Uncle     No Known Problems Paternal Aunt     No Known Problems Paternal Uncle     No Known Problems Maternal Grandmother     No Known Problems Maternal Grandfather     No Known  Problems Paternal Grandmother     No Known Problems Paternal Grandfather     Colon cancer Neg Hx     Breast cancer Neg Hx     Amblyopia Neg Hx     Blindness Neg Hx     Cancer Neg Hx     Diabetes Neg Hx     Glaucoma Neg Hx     Hypertension Neg Hx     Macular degeneration Neg Hx     Retinal detachment Neg Hx     Strabismus Neg Hx     Stroke Neg Hx     Thyroid disease Neg Hx     Melanoma Neg Hx      Social History     Tobacco Use    Smoking status: Never Smoker    Smokeless tobacco: Never Used   Substance Use Topics    Alcohol use: Yes     Alcohol/week: 1.0 standard drinks     Types: 1 Glasses of wine per week     Comment: ocasionally    Drug use: No     Review of Systems   Constitutional: Negative for fever.   HENT: Negative for congestion, sore throat and trouble swallowing.    Respiratory: Negative for cough and shortness of breath.    Cardiovascular: Negative for chest pain.   Gastrointestinal: Negative for abdominal pain, constipation, diarrhea, nausea and vomiting.   Genitourinary: Negative for dysuria, flank pain, frequency and urgency.   Musculoskeletal: Negative for back pain.   Skin: Positive for wound.   Neurological: Negative for numbness and headaches.   All other systems reviewed and are negative.      Physical Exam     Initial Vitals [04/29/20 1743]   BP Pulse Resp Temp SpO2   (!) 151/67 66 18 98.1 °F (36.7 °C) 100 %      MAP       --         Physical Exam    Nursing note and vitals reviewed.  Constitutional: She appears well-developed and well-nourished. She is not diaphoretic. No distress.   HENT:   Head: Normocephalic and atraumatic.   Nose: Nose normal.   Eyes: Conjunctivae and EOM are normal. Right eye exhibits no discharge. Left eye exhibits no discharge.   Neck: Normal range of motion. No tracheal deviation present. No JVD present.   Cardiovascular: Normal rate, regular rhythm and normal heart sounds. Exam reveals no friction rub.    No murmur heard.  Pulmonary/Chest: Breath  sounds normal. No stridor. No respiratory distress. She has no wheezes. She has no rhonchi. She has no rales. She exhibits no tenderness.   Musculoskeletal: Normal range of motion.        Hands:  Superficial abrasions along the ulnar aspect of the dorsal surface of the right hand.  No active bleeding.  Minimal focal tenderness.  No visible or palpable foreign bodies.  No snuffbox tenderness.  Full ROM of digits.  Capillary refill less 2 sec. Sensation intact and equal.   Neurological: She is alert and oriented to person, place, and time.   Skin: Skin is warm and dry. No rash and no abscess noted. No erythema. No pallor.         ED Course   Procedures  Labs Reviewed - No data to display       Imaging Results    None          Medical Decision Making:   History:   Old Medical Records: I decided to obtain old medical records.  ED Management:  Wounds cleaned and dressed in the ED.  Started on Augmentin.  Pain controlled.  Tetanus updated.  No convincing evidence for retained foreign body.  No vascular compromise.  No septic joint or evidence of active infection at this time.  Sent home with antibiotics and advised follow-up with PCP for re-evaluation.  Strict return precautions discussed with patient who is agreeable to the plan.                                 Clinical Impression:       ICD-10-CM ICD-9-CM   1. Cat bite of right hand, initial encounter S61.451A 882.0    W55.01XA E906.3             ED Disposition Condition    Discharge Stable        ED Prescriptions     Medication Sig Dispense Start Date End Date Auth. Provider    amoxicillin-clavulanate 875-125mg (AUGMENTIN) 875-125 mg per tablet Take 1 tablet by mouth 2 (two) times daily. for 10 days 20 tablet 4/29/2020 5/9/2020 Jose Daniel Roque PA-C    mupirocin (BACTROBAN) 2 % ointment Apply topically 3 (three) times daily. for 10 days 1 Tube 4/29/2020 5/9/2020 Jose Daniel Roque PA-C        Follow-up Information     Follow up With Specialties Details Why Contact Info     Julian Lee MD Internal Medicine Schedule an appointment as soon as possible for a visit in 1 week For re-evaluation [address]  [place] [address]  [phone]      [facility] Emergency Medicine Go to  If symptoms worsen [address]  [facility] [address] [phone]                                     Jose Daniel Roque PA-C  04/29/20 4954

## 2020-04-29 NOTE — PROVIDER PROGRESS NOTES - EMERGENCY DEPT.
Emergency Department TeleTRIAGE Encounter Note      CHIEF COMPLAINT    Chief Complaint   Patient presents with    Animal Bite     Pt reports her neighbor's cat bit her RIGHT hand 2 hours ago. Puncture marks and swelling noted. Bleeding controlled. pain is 5/10       VITAL SIGNS   Initial Vitals [04/29/20 1743]   BP Pulse Resp Temp SpO2   (!) 151/67 66 18 98.1 °F (36.7 °C) 100 %      MAP       --            ALLERGIES    Review of patient's allergies indicates:   Allergen Reactions    Codeine     Iodine and iodide containing products     Iodinated contrast media      Rash (skin)^  Rash (skin)^    Mobic [meloxicam]     Doxycycline Rash       PROVIDER TRIAGE NOTE  Patient presents for cat bite to right hand. Injury occurred approximately 2 hours ago. Patient reports increased pain and swelling. She states that the cat is up to date on vaccines. She has a history of getting infections from animal bites. She reports increased pain with movement. Unsure of last tetanus.      ORDERS  Labs Reviewed - No data to display    ED Orders (720h ago, onward)    Start Ordered     Status Ordering Provider    04/29/20 1800 04/29/20 1756  amoxicillin-clavulanate 875-125mg per tablet 1 tablet  ED 1 Time      Ordered ARIELA MELGOZA G.    04/29/20 1756 04/29/20 1756  Nursing communication  Once     Comments:  Clean wound with betadine and normal saline. Bandage with sterile dressings.    Ordered ARIELA MELGOZA G.    04/29/20 1756 04/29/20 1756  acetaminophen tablet 650 mg  ED 1 Time      Ordered ARMANI MELGOZAY G.    04/29/20 1756 04/29/20 1756  Tdap vaccine injection 0.5 mL  Once      Ordered ARIELA MELGOZA            Virtual Visit Note: The provider triage portion of this emergency department evaluation and documentation was performed via [employer], a HIPAA-compliant telemedicine application, in concert with a tele-presenter in the room. A face to face patient evaluation with one of my colleagues will occur once the patient is placed  in an emergency department room.      DISCLAIMER: This note was prepared with [employer] voice recognition transcription software. Garbled syntax, mangled pronouns, and other bizarre constructions may be attributed to that software system.

## 2020-05-04 RX ORDER — OLMESARTAN MEDOXOMIL 40 MG/1
TABLET ORAL
Qty: 30 TABLET | Refills: 1 | Status: SHIPPED | OUTPATIENT
Start: 2020-05-04 | End: 2020-06-01

## 2020-05-11 RX ORDER — FOLIC ACID 1 MG/1
TABLET ORAL
Qty: 90 TABLET | Refills: 12 | Status: SHIPPED | OUTPATIENT
Start: 2020-05-11 | End: 2021-05-17

## 2020-05-12 ENCOUNTER — PATIENT MESSAGE (OUTPATIENT)
Dept: FAMILY MEDICINE | Facility: CLINIC | Age: 70
End: 2020-05-12

## 2020-05-12 RX ORDER — AMOXICILLIN AND CLAVULANATE POTASSIUM 875; 125 MG/1; MG/1
1 TABLET, FILM COATED ORAL 2 TIMES DAILY
Qty: 20 TABLET | Refills: 0 | Status: SHIPPED | OUTPATIENT
Start: 2020-05-12 | End: 2020-05-22

## 2020-05-14 RX ORDER — HYDROCHLOROTHIAZIDE 25 MG/1
TABLET ORAL
Qty: 90 TABLET | Refills: 0 | Status: SHIPPED | OUTPATIENT
Start: 2020-05-14 | End: 2020-08-25 | Stop reason: SDUPTHER

## 2020-06-03 ENCOUNTER — TELEPHONE (OUTPATIENT)
Dept: ORTHOPEDICS | Facility: CLINIC | Age: 70
End: 2020-06-03

## 2020-06-03 DIAGNOSIS — T14.8XXA ANIMAL BITE: ICD-10-CM

## 2020-06-03 DIAGNOSIS — R52 PAIN: Primary | ICD-10-CM

## 2020-06-04 ENCOUNTER — TELEPHONE (OUTPATIENT)
Dept: ORTHOPEDICS | Facility: CLINIC | Age: 70
End: 2020-06-04

## 2020-06-05 ENCOUNTER — OFFICE VISIT (OUTPATIENT)
Dept: ORTHOPEDICS | Facility: CLINIC | Age: 70
End: 2020-06-05
Payer: MEDICARE

## 2020-06-05 ENCOUNTER — HOSPITAL ENCOUNTER (OUTPATIENT)
Dept: RADIOLOGY | Facility: OTHER | Age: 70
Discharge: HOME | End: 2020-06-05
Attending: PHYSICIAN ASSISTANT
Payer: MEDICARE

## 2020-06-05 VITALS
HEIGHT: 61 IN | WEIGHT: 132 LBS | HEART RATE: 64 BPM | BODY MASS INDEX: 24.92 KG/M2 | DIASTOLIC BLOOD PRESSURE: 80 MMHG | SYSTOLIC BLOOD PRESSURE: 128 MMHG

## 2020-06-05 DIAGNOSIS — T14.8XXA ANIMAL BITE: ICD-10-CM
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
## 2020-06-05 DIAGNOSIS — S61.451A CAT BITE OF HAND, RIGHT, INITIAL ENCOUNTER: Primary | ICD-10-CM

## 2020-06-05 DIAGNOSIS — R52 PAIN: ICD-10-CM

## 2020-06-05 PROCEDURE — 3288F FALL RISK ASSESSMENT DOCD: CPT | Mod: HCNC,CPTII,S$GLB, | Performed by: PHYSICIAN ASSISTANT

## 2020-06-05 PROCEDURE — 99999 PR PBB SHADOW E&M-EST. PATIENT-LVL III: CPT | Mod: PBBFAC,HCNC,, | Performed by: PHYSICIAN ASSISTANT

## 2020-06-05 PROCEDURE — 99204 PR OFFICE/OUTPT VISIT, NEW, LEVL IV, 45-59 MIN: ICD-10-PCS | Mod: HCNC,S$GLB,, | Performed by: PHYSICIAN ASSISTANT

## 2020-06-05 PROCEDURE — 1125F PR PAIN SEVERITY QUANTIFIED, PAIN PRESENT: ICD-10-PCS | Mod: HCNC,S$GLB,, | Performed by: PHYSICIAN ASSISTANT

## 2020-06-05 PROCEDURE — 1125F AMNT PAIN NOTED PAIN PRSNT: CPT | Mod: HCNC,S$GLB,, | Performed by: PHYSICIAN ASSISTANT

## 2020-06-05 PROCEDURE — 1159F MED LIST DOCD IN RCRD: CPT | Mod: HCNC,S$GLB,, | Performed by: PHYSICIAN ASSISTANT

## 2020-06-05 PROCEDURE — 73130 X-RAY EXAM OF HAND: CPT | Mod: TC,HCNC,FY,RT

## 2020-06-05 PROCEDURE — 99999 PR PBB SHADOW E&M-EST. PATIENT-LVL III: ICD-10-PCS | Mod: PBBFAC,HCNC,, | Performed by: PHYSICIAN ASSISTANT

## 2020-06-05 PROCEDURE — 3079F DIAST BP 80-89 MM HG: CPT | Mod: HCNC,CPTII,S$GLB, | Performed by: PHYSICIAN ASSISTANT

## 2020-06-05 PROCEDURE — 73130 XR HAND COMPLETE 3 VIEW RIGHT: ICD-10-PCS | Mod: 26,HCNC,RT, | Performed by: RADIOLOGY

## 2020-06-05 PROCEDURE — 3074F SYST BP LT 130 MM HG: CPT | Mod: HCNC,CPTII,S$GLB, | Performed by: PHYSICIAN ASSISTANT

## 2020-06-05 PROCEDURE — 1100F PTFALLS ASSESS-DOCD GE2>/YR: CPT | Mod: HCNC,CPTII,S$GLB, | Performed by: PHYSICIAN ASSISTANT

## 2020-06-05 PROCEDURE — 73130 X-RAY EXAM OF HAND: CPT | Mod: 26,HCNC,RT, | Performed by: RADIOLOGY

## 2020-06-05 PROCEDURE — 1100F PR PT FALLS ASSESS DOC 2+ FALLS/FALL W/INJURY/YR: ICD-10-PCS | Mod: HCNC,CPTII,S$GLB, | Performed by: PHYSICIAN ASSISTANT

## 2020-06-05 PROCEDURE — 1159F PR MEDICATION LIST DOCUMENTED IN MEDICAL RECORD: ICD-10-PCS | Mod: HCNC,S$GLB,, | Performed by: PHYSICIAN ASSISTANT

## 2020-06-05 PROCEDURE — 3074F PR MOST RECENT SYSTOLIC BLOOD PRESSURE < 130 MM HG: ICD-10-PCS | Mod: HCNC,CPTII,S$GLB, | Performed by: PHYSICIAN ASSISTANT

## 2020-06-05 PROCEDURE — 3288F PR FALLS RISK ASSESSMENT DOCUMENTED: ICD-10-PCS | Mod: HCNC,CPTII,S$GLB, | Performed by: PHYSICIAN ASSISTANT

## 2020-06-05 PROCEDURE — 99204 OFFICE O/P NEW MOD 45 MIN: CPT | Mod: HCNC,S$GLB,, | Performed by: PHYSICIAN ASSISTANT

## 2020-06-05 PROCEDURE — 3079F PR MOST RECENT DIASTOLIC BLOOD PRESSURE 80-89 MM HG: ICD-10-PCS | Mod: HCNC,CPTII,S$GLB, | Performed by: PHYSICIAN ASSISTANT

## 2020-06-05 NOTE — PROGRESS NOTES
Subjective:      Patient ID: Aleksandra Santana is a 70 y.o. female.    Chief Complaint: Pain of the Right Hand      HPI  Aleksandra Santana is a 70 y.o. female presenting today referred by her PCP right hand evaluation. She sustained a cat bite to right hand on 4/29/20. She was seen in the ED day of injury and placed on a course of Augmentin which she completed. Following improvement in cellulitis, she noted a small lump over the dorsal hand over the 4th metacarpal. Her PCP prescribed another course of abx, pt took some of the prescription however did not complete as erythema and edema had resolved. She continues to have a small mass over the dorsal hand which is tender, she notes it moved with the tendon.    Review of patient's allergies indicates:   Allergen Reactions    Codeine     Iodine and iodide containing products     Iodinated contrast media      Rash (skin)^  Rash (skin)^    Mobic [meloxicam]     Doxycycline Rash         Current Outpatient Medications   Medication Sig Dispense Refill    amLODIPine (NORVASC) 5 MG tablet TAKE ONE TABLET BY MOUTH EVERY DAY 90 tablet 11    azelastine (ASTELIN) 137 mcg (0.1 %) nasal spray INHALE 2 SPRAYS IN EACH NOSTRIL TWICE DAILY 30 mL 12    butalbital-acetaminophen-caffeine -40 mg (FIORICET, ESGIC) -40 mg per tablet Take 1 tablet by mouth every 6 (six) hours as needed. 30 tablet 0    butalbital-aspirin-caffeine -40 mg (FIORINAL) -40 mg Cap Take 1 capsule by mouth.      calcium-vitamin D tablet 600 mg-200 units Take 2 tablets by mouth once.      carvediloL (COREG) 25 MG tablet TAKE ONE TABLET BY MOUTH EVERY DAY 90 tablet 8    celecoxib (CELEBREX) 200 MG capsule TAKE ONE CAPSULE BY MOUTH TWICE DAILY 60 capsule 1    fluticasone propionate (FLONASE) 50 mcg/actuation nasal spray INHALE 2 SPRAYS IN EACH NOSTRIL ONCE DAILY 16 g 12    folic acid (FOLVITE) 1 MG tablet TAKE ONE TABLET BY MOUTH ONCE DAILY 90 tablet 12    gabapentin (NEURONTIN) 300 MG  capsule Take 1 po x 1 day, then 1 po BID x 1 day, then 1 po TID. When discontinuing, decrease to 1 po BID x 4 days then 1 po qd x 4 days then stop 90 capsule 1    hydroCHLOROthiazide (HYDRODIURIL) 25 MG tablet TAKE 1 TABLET BY MOUTH EVERY DAY 90 tablet 0    levocetirizine (XYZAL) 5 MG tablet TAKE ONE TABLET BY MOUTH EVERY DAY 30 tablet 12    meclizine (ANTIVERT) 25 mg tablet Take 1 tablet (25 mg total) by mouth 3 (three) times daily as needed for Dizziness or Nausea. 30 tablet 0    olmesartan (BENICAR) 40 MG tablet TAKE 1 TABLET BY MOUTH EVERY DAY 30 tablet 1    olopatadine (PATANOL) 0.1 % ophthalmic solution Place 1 drop into both eyes 2 (two) times daily. 5 mL 5    omeprazole (PRILOSEC) 20 MG capsule Take 1 capsule (20 mg total) by mouth before breakfast. 90 capsule 3    PREMARIN 1.25 mg tablet TAKE ONE TABLET BY MOUTH ONCE DAILY 30 tablet 3    promethazine (PHENERGAN) 12.5 MG Tab TAKE ONE Tablet BY MOUTH EVERY 6 HOURS AS NEEDED 15 tablet 2    SYNTHROID 88 mcg tablet TAKE ONE TABLET BY MOUTH EVERY DAY 30 tablet 3    valACYclovir (VALTREX) 1000 MG tablet Take 1 tab po TID x 7 days 21 tablet 0    ibandronate (BONIVA) 150 mg tablet Take 1 tablet (150 mg total) by mouth every 30 days. 1 tablet 11     No current facility-administered medications for this visit.        Past Medical History:   Diagnosis Date    Abnormal EKG     Allergy     seasonal    Anxiety     Breast cyst     Cataract     Fibrocystic breast     GERD (gastroesophageal reflux disease)     History of colonic polyps     1 polyp  --5 yrs.    Hypertension     Hypothyroidism     IBS (irritable bowel syndrome)     Keloid cicatrix     Migraine syndrome     OA (osteoarthritis)     Osteopenia     BMD  --no tx -repeat 2 yrs    Osteoporosis, postmenopausal     Palpitations     SOB (shortness of breath)        Past Surgical History:   Procedure Laterality Date    APPENDECTOMY       SECTION      x2    HYSTERECTOMY   "age 33    COLEEN/BSO for endometriosis    OOPHORECTOMY      TONSILLECTOMY         Review of Systems:  Constitutional: Negative for chills and fever.   Respiratory: Negative for cough and shortness of breath.    Gastrointestinal: Negative for nausea and vomiting.   Skin: Negative for rash.   Neurological: Negative for dizziness and headaches.   Psychiatric/Behavioral: Negative for depression.   MSK as in HPI       OBJECTIVE:     PHYSICAL EXAM:  /80   Pulse 64   Ht 5' 1" (1.549 m)   Wt 59.9 kg (132 lb)   BMI 24.94 kg/m²     GEN:  NAD, well-developed, well-groomed.  NEURO: Awake, alert, and oriented. Normal attention and concentration.    PSYCH: Normal mood and affect. Behavior is normal.  HEENT: No cervical lymphadenopathy noted.  CARDIOVASCULAR: Radial pulses 2+ bilaterally. No LE edema noted.  PULMONARY: Breath sounds normal. No respiratory distress.  SKIN: Intact, no rashes.      MSK:   RUE:  Good active ROM of the wrist and fingers. There is a small mass 1-2 cm over the dorsal 4th metacarpal, moves with tendon likely in tendon sheath, mild ttp. No signs of infection present. No erythema or edema noted. Cat bite puncture wounds are well healed. AIN/PIN/Radial/Median/Ulnar Nerves assessed in isolation without deficit. Radial & Ulnar arteries palpated 2+. Capillary Refill <3s.      RADIOGRAPHS:  Xray right hand 6/5/20  Impression     Degenerative change, most notable at the 3rd and 5th DIP joints.  There is suggestion of early erosive change noting gull wing type deformities at the 3rd and 5th digits.   Comments: I have personally reviewed the imaging and I agree with the above radiologist's report.    ASSESSMENT/PLAN:       ICD-10-CM ICD-9-CM   1. Cat bite of hand, right, initial encounter S61.451A 882.0    W55.01XA E906.3       Orders Placed This Encounter    MRI Hand Fingers Without Contrast Right     Plan:   -will obtain MRI for further evaluation   -RTC for results, discussed possible need for " tenosynovectomy pending results/symptoms       The patient indicates understanding of these issues and agrees to the plan.    Madyson Cedeño PA-C  Hand Clinic   [facility] [place], [place]

## 2020-06-11 ENCOUNTER — HOSPITAL ENCOUNTER (OUTPATIENT)
Dept: RADIOLOGY | Facility: HOSPITAL | Age: 70
Discharge: HOME | End: 2020-06-11
Attending: PHYSICIAN ASSISTANT
Payer: MEDICARE

## 2020-06-11 DIAGNOSIS — S61.451A CAT BITE OF HAND, RIGHT, INITIAL ENCOUNTER: ICD-10-CM
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
## 2020-06-11 PROCEDURE — 73218 MRI UPPER EXTREMITY W/O DYE: CPT | Mod: TC,HCNC,RT

## 2020-06-11 PROCEDURE — 73218 MRI UPPER EXTREMITY W/O DYE: CPT | Mod: 26,HCNC,RT, | Performed by: RADIOLOGY

## 2020-06-11 PROCEDURE — 73218 MRI HAND FINGERS WITHOUT CONTRAST RIGHT: ICD-10-PCS | Mod: 26,HCNC,RT, | Performed by: RADIOLOGY

## 2020-06-12 DIAGNOSIS — S61.451A CAT BITE OF HAND, RIGHT, INITIAL ENCOUNTER: Primary | ICD-10-CM
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
## 2020-06-16 ENCOUNTER — OFFICE VISIT (OUTPATIENT)
Dept: ORTHOPEDICS | Facility: CLINIC | Age: 70
End: 2020-06-16
Payer: MEDICARE

## 2020-06-16 VITALS
WEIGHT: 131 LBS | BODY MASS INDEX: 24.73 KG/M2 | SYSTOLIC BLOOD PRESSURE: 133 MMHG | DIASTOLIC BLOOD PRESSURE: 77 MMHG | HEIGHT: 61 IN | HEART RATE: 67 BPM
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
## 2020-06-16 DIAGNOSIS — S61.451A CAT BITE OF HAND, RIGHT, INITIAL ENCOUNTER: Primary | ICD-10-CM

## 2020-06-16 PROCEDURE — 3078F PR MOST RECENT DIASTOLIC BLOOD PRESSURE < 80 MM HG: ICD-10-PCS | Mod: HCNC,CPTII,S$GLB, | Performed by: ORTHOPAEDIC SURGERY

## 2020-06-16 PROCEDURE — 3288F PR FALLS RISK ASSESSMENT DOCUMENTED: ICD-10-PCS | Mod: HCNC,CPTII,S$GLB, | Performed by: ORTHOPAEDIC SURGERY

## 2020-06-16 PROCEDURE — 3288F FALL RISK ASSESSMENT DOCD: CPT | Mod: HCNC,CPTII,S$GLB, | Performed by: ORTHOPAEDIC SURGERY

## 2020-06-16 PROCEDURE — 99213 PR OFFICE/OUTPT VISIT, EST, LEVL III, 20-29 MIN: ICD-10-PCS | Mod: HCNC,S$GLB,, | Performed by: ORTHOPAEDIC SURGERY

## 2020-06-16 PROCEDURE — 3078F DIAST BP <80 MM HG: CPT | Mod: HCNC,CPTII,S$GLB, | Performed by: ORTHOPAEDIC SURGERY

## 2020-06-16 PROCEDURE — 99999 PR PBB SHADOW E&M-EST. PATIENT-LVL IV: CPT | Mod: PBBFAC,HCNC,, | Performed by: ORTHOPAEDIC SURGERY

## 2020-06-16 PROCEDURE — 1126F AMNT PAIN NOTED NONE PRSNT: CPT | Mod: HCNC,S$GLB,, | Performed by: ORTHOPAEDIC SURGERY

## 2020-06-16 PROCEDURE — 99999 PR PBB SHADOW E&M-EST. PATIENT-LVL IV: ICD-10-PCS | Mod: PBBFAC,HCNC,, | Performed by: ORTHOPAEDIC SURGERY

## 2020-06-16 PROCEDURE — 1100F PTFALLS ASSESS-DOCD GE2>/YR: CPT | Mod: HCNC,CPTII,S$GLB, | Performed by: ORTHOPAEDIC SURGERY

## 2020-06-16 PROCEDURE — 99213 OFFICE O/P EST LOW 20 MIN: CPT | Mod: HCNC,S$GLB,, | Performed by: ORTHOPAEDIC SURGERY

## 2020-06-16 PROCEDURE — 1126F PR PAIN SEVERITY QUANTIFIED, NO PAIN PRESENT: ICD-10-PCS | Mod: HCNC,S$GLB,, | Performed by: ORTHOPAEDIC SURGERY

## 2020-06-16 PROCEDURE — 1159F PR MEDICATION LIST DOCUMENTED IN MEDICAL RECORD: ICD-10-PCS | Mod: HCNC,S$GLB,, | Performed by: ORTHOPAEDIC SURGERY

## 2020-06-16 PROCEDURE — 1159F MED LIST DOCD IN RCRD: CPT | Mod: HCNC,S$GLB,, | Performed by: ORTHOPAEDIC SURGERY

## 2020-06-16 PROCEDURE — 3075F SYST BP GE 130 - 139MM HG: CPT | Mod: HCNC,CPTII,S$GLB, | Performed by: ORTHOPAEDIC SURGERY

## 2020-06-16 PROCEDURE — 1100F PR PT FALLS ASSESS DOC 2+ FALLS/FALL W/INJURY/YR: ICD-10-PCS | Mod: HCNC,CPTII,S$GLB, | Performed by: ORTHOPAEDIC SURGERY

## 2020-06-16 PROCEDURE — 3075F PR MOST RECENT SYSTOLIC BLOOD PRESS GE 130-139MM HG: ICD-10-PCS | Mod: HCNC,CPTII,S$GLB, | Performed by: ORTHOPAEDIC SURGERY

## 2020-06-16 NOTE — PROGRESS NOTES
Subjective:      Patient ID: Aleksandra Santana is a 70 y.o. female.    Chief Complaint: Bite of the Right Hand      HPI  Aleksandra Santana is a 70 y.o. female returns for follow up s/p cat bite to right hand resulting in severe infection. She was successfully treated with antibiotics. On last visit she had a soft tissue mass overlying the tendon of the 4th MC. This has some minimal tenderness as does the other puncture wounds. All of which are completely healed. Otherwise no functional deficit is noted. An MRI was ordered and negative, only shows mild edema at the puncture site.        Review of patient's allergies indicates:   Allergen Reactions    Codeine     Iodine and iodide containing products     Iodinated contrast media      Rash (skin)^  Rash (skin)^    Mobic [meloxicam]     Doxycycline Rash         Current Outpatient Medications   Medication Sig Dispense Refill    amLODIPine (NORVASC) 5 MG tablet TAKE ONE TABLET BY MOUTH EVERY DAY 90 tablet 11    azelastine (ASTELIN) 137 mcg (0.1 %) nasal spray INHALE 2 SPRAYS IN EACH NOSTRIL TWICE DAILY 30 mL 12    butalbital-acetaminophen-caffeine -40 mg (FIORICET, ESGIC) -40 mg per tablet Take 1 tablet by mouth every 6 (six) hours as needed. 30 tablet 0    butalbital-aspirin-caffeine -40 mg (FIORINAL) -40 mg Cap Take 1 capsule by mouth.      calcium-vitamin D tablet 600 mg-200 units Take 2 tablets by mouth once.      carvediloL (COREG) 25 MG tablet TAKE ONE TABLET BY MOUTH EVERY DAY 90 tablet 8    celecoxib (CELEBREX) 200 MG capsule TAKE ONE CAPSULE BY MOUTH TWICE DAILY 60 capsule 1    fluticasone propionate (FLONASE) 50 mcg/actuation nasal spray INHALE 2 SPRAYS IN EACH NOSTRIL ONCE DAILY 16 g 12    folic acid (FOLVITE) 1 MG tablet TAKE ONE TABLET BY MOUTH ONCE DAILY 90 tablet 12    gabapentin (NEURONTIN) 300 MG capsule Take 1 po x 1 day, then 1 po BID x 1 day, then 1 po TID. When discontinuing, decrease to 1 po BID x 4 days then 1 po qd  x 4 days then stop 90 capsule 1    hydroCHLOROthiazide (HYDRODIURIL) 25 MG tablet TAKE 1 TABLET BY MOUTH EVERY DAY 90 tablet 0    levocetirizine (XYZAL) 5 MG tablet TAKE ONE TABLET BY MOUTH EVERY DAY 30 tablet 12    meclizine (ANTIVERT) 25 mg tablet Take 1 tablet (25 mg total) by mouth 3 (three) times daily as needed for Dizziness or Nausea. 30 tablet 0    olmesartan (BENICAR) 40 MG tablet TAKE 1 TABLET BY MOUTH EVERY DAY 30 tablet 1    olopatadine (PATANOL) 0.1 % ophthalmic solution Place 1 drop into both eyes 2 (two) times daily. 5 mL 5    omeprazole (PRILOSEC) 20 MG capsule Take 1 capsule (20 mg total) by mouth before breakfast. 90 capsule 3    PREMARIN 1.25 mg tablet TAKE ONE TABLET BY MOUTH ONCE DAILY 30 tablet 3    promethazine (PHENERGAN) 12.5 MG Tab TAKE ONE Tablet BY MOUTH EVERY 6 HOURS AS NEEDED 15 tablet 2    SYNTHROID 88 mcg tablet TAKE ONE TABLET BY MOUTH EVERY DAY 30 tablet 3    valACYclovir (VALTREX) 1000 MG tablet Take 1 tab po TID x 7 days 21 tablet 0    ibandronate (BONIVA) 150 mg tablet Take 1 tablet (150 mg total) by mouth every 30 days. 1 tablet 11     No current facility-administered medications for this visit.        Past Medical History:   Diagnosis Date    Abnormal EKG     Allergy     seasonal    Anxiety     Breast cyst     Cataract     Fibrocystic breast     GERD (gastroesophageal reflux disease)     History of colonic polyps     1 polyp  --5 yrs.    Hypertension     Hypothyroidism     IBS (irritable bowel syndrome)     Keloid cicatrix     Migraine syndrome     OA (osteoarthritis)     Osteopenia     BMD  --no tx -repeat 2 yrs    Osteoporosis, postmenopausal     Palpitations     SOB (shortness of breath)        Past Surgical History:   Procedure Laterality Date    APPENDECTOMY       SECTION      x2    HYSTERECTOMY  age 33    COLEEN/BSO for endometriosis    OOPHORECTOMY      TONSILLECTOMY         Review of Systems:  Constitutional: Negative for  chills and fever.   Respiratory: Negative for cough and shortness of breath.    Gastrointestinal: Negative for nausea and vomiting.   Skin: Negative for rash.   Neurological: Negative for dizziness and headaches.   Psychiatric/Behavioral: Negative for depression.   MSK as in HPI       OBJECTIVE:     PHYSICAL EXAM:  There were no vitals taken for this visit.    GEN:  NAD, well-developed, well-groomed.  NEURO: Awake, alert, and oriented. Normal attention and concentration.    PSYCH: Normal mood and affect. Behavior is normal.  HEENT: No cervical lymphadenopathy noted.  CARDIOVASCULAR: Radial pulses 2+ bilaterally. No LE edema noted.  PULMONARY: Breath sounds normal. No respiratory distress.  SKIN: Intact, no rashes.      MSK:   RUE:  Good active ROM of the wrist and fingers. There is a small mass 1-2 cm over the dorsal 4th metacarpal, moves with tendon likely in tendon sheath, mild ttp. No signs of infection present. No erythema or edema noted. Cat bite puncture wounds are well healed. AIN/PIN/Radial/Median/Ulnar Nerves assessed in isolation without deficit. Radial & Ulnar arteries palpated 2+. Capillary Refill <3s.      RADIOGRAPHS:  Xray right hand 6/5/20  Impression     Degenerative change, most notable at the 3rd and 5th DIP joints.  There is suggestion of early erosive change noting gull wing type deformities at the 3rd and 5th digits.   Comments: I have personally reviewed the imaging and I agree with the above radiologist's report.    MRI right hand:  1. Mild soft tissue edema within the soft tissues dorsal to the 4th metacarpal base, correlating with overlying skin marker.  No discrete mass, fluid collection, or tenosynovitis.      ASSESSMENT/PLAN:     No diagnosis found.       Plan:   - PT as scheduled  - RTC PRN  - referral to Dr Fiore for bone spurs in foot  - gave prescription for topical Lipoderm       The patient indicates understanding of these issues and agrees to the plan.    Vin Martin MD  Plastic  Surgery Fellow  Hand Clinic   [facility]  [place]

## 2020-06-24 NOTE — PROGRESS NOTES
I have personally taken the history and examined this patient. I agree with the resident's note as stated above. Pt is doing better    MRI right hand:  1. Mild soft tissue edema within the soft tissues dorsal to the 4th metacarpal base, correlating with overlying skin marker.  No discrete mass, fluid collection, or tenosynovitis.        ASSESSMENT/PLAN:      No diagnosis found.        Plan:   - PT as scheduled  - RTC PRN  - referral to Dr Fiore for bone spurs in foot  - gave prescription for topical Lipoderm         The patient indicates understanding of these issues and agrees to the plan.

## 2020-06-26 DIAGNOSIS — E03.9 HYPOTHYROIDISM, UNSPECIFIED TYPE: ICD-10-CM

## 2020-06-26 RX ORDER — LEVOTHYROXINE SODIUM 88 UG/1
TABLET ORAL
Qty: 90 TABLET | Refills: 1 | Status: SHIPPED | OUTPATIENT
Start: 2020-06-26 | End: 2022-02-15

## 2020-07-20 ENCOUNTER — TELEPHONE (OUTPATIENT)
Dept: DERMATOLOGY | Facility: CLINIC | Age: 70
End: 2020-07-20

## 2020-07-20 NOTE — TELEPHONE ENCOUNTER
----- Message from Rosie Kevin sent at 7/20/2020 11:29 AM CDT -----  Regarding: Ak's?  Was seen in past for same thing and given a cream  Contact: [phone]  Paul pt-I offered pt an August appt with another provider but pt states tht she needs to be seen right away.  The area that Paul treated before is burning.  But has other areas.  Pt has no hx of skin cancer.  Please call.  Will be willing ot see anyone if Paul cannot see her also.

## 2020-07-20 NOTE — TELEPHONE ENCOUNTER
Spoke to pt and informed her to send pictures to area related to her concern. Pt was adamant about coming in due to h/o BCC and location of spot. Please advise.    Thanks,    JT

## 2020-07-29 ENCOUNTER — OFFICE VISIT (OUTPATIENT)
Dept: URGENT CARE | Facility: CLINIC | Age: 70
End: 2020-07-29
Payer: MEDICARE

## 2020-07-29 VITALS
DIASTOLIC BLOOD PRESSURE: 70 MMHG | SYSTOLIC BLOOD PRESSURE: 126 MMHG | OXYGEN SATURATION: 97 % | HEART RATE: 72 BPM | TEMPERATURE: 97 F

## 2020-07-29 DIAGNOSIS — Z20.822 CLOSE EXPOSURE TO COVID-19 VIRUS: Primary | ICD-10-CM

## 2020-07-29 PROCEDURE — 99214 OFFICE O/P EST MOD 30 MIN: CPT | Mod: S$GLB,,, | Performed by: INTERNAL MEDICINE

## 2020-07-29 PROCEDURE — U0003 INFECTIOUS AGENT DETECTION BY NUCLEIC ACID (DNA OR RNA); SEVERE ACUTE RESPIRATORY SYNDROME CORONAVIRUS 2 (SARS-COV-2) (CORONAVIRUS DISEASE [COVID-19]), AMPLIFIED PROBE TECHNIQUE, MAKING USE OF HIGH THROUGHPUT TECHNOLOGIES AS DESCRIBED BY CMS-2020-01-R: HCPCS | Mod: HCNC

## 2020-07-29 PROCEDURE — 99214 PR OFFICE/OUTPT VISIT, EST, LEVL IV, 30-39 MIN: ICD-10-PCS | Mod: S$GLB,,, | Performed by: INTERNAL MEDICINE

## 2020-07-29 RX ORDER — AZITHROMYCIN 250 MG/1
TABLET, FILM COATED ORAL
Qty: 6 TABLET | Refills: 0 | Status: SHIPPED | OUTPATIENT
Start: 2020-07-29 | End: 2020-08-03

## 2020-07-30 NOTE — PROGRESS NOTES
Subjective:       Patient ID: Aleksandra Santana is a 70 y.o. female.    Vitals:  temperature is 96.5 °F (35.8 °C). Her blood pressure is 126/70 and her pulse is 72. Her oxygen saturation is 97%.     Chief Complaint: URI    71 y/o female with PMHx of multiple medical problems presents to urgent care with concerns of COVID19. Pt has been c/o nasal congestion, sore throat, sinus pressure, and generalized headache that started 1 week ago. Symptoms have been intermittent and moderate since onset. Pt has been taking advil with no relief. Pt denies recent illness/travel, fever, chills, loss of smell/taste, sore throat, difficulty swallowing, nasal congestion, cough, SOB, chest pain, leg pain/swelling, N/V/D, abdominal pain, back pain, neck pain, headache, and rash.      URI   This is a new problem. The current episode started in the past 7 days. The problem has been gradually worsening. Associated symptoms include congestion, headaches and a sore throat. Pertinent negatives include no abdominal pain, chest pain, coughing, diarrhea, dysuria, ear pain, nausea, neck pain, rash, sinus pain, vomiting or wheezing. She has tried NSAIDs for the symptoms. The treatment provided no relief.       Constitution: Negative for chills, sweating, fatigue and fever.   HENT: Positive for congestion, sinus pressure and sore throat. Negative for ear pain, sinus pain and voice change.    Neck: Negative for neck pain and painful lymph nodes.   Cardiovascular: Negative for chest pain, leg swelling, palpitations and sob on exertion.   Eyes: Negative for eye redness, photophobia, double vision and blurred vision.   Respiratory: Negative for chest tightness, cough, sputum production, bloody sputum, COPD, shortness of breath, stridor, wheezing and asthma.    Gastrointestinal: Negative for abdominal pain, nausea, vomiting and diarrhea.   Genitourinary: Negative for dysuria and frequency.   Musculoskeletal: Negative for back pain and muscle ache.   Skin:  Negative for rash.   Allergic/Immunologic: Negative for seasonal allergies and asthma.   Neurological: Positive for headaches. Negative for numbness and tingling.   Hematologic/Lymphatic: Negative for swollen lymph nodes.       Objective:      Physical Exam      Due to the state of emergency surrounding the COVID-19 pandemic, this exam was completed remotely per [facility]'s current protocol.    Assessment:       1. Close Exposure to Covid-19 Virus        Plan:         Close Exposure to Covid-19 Virus  -     COVID-19 Routine Screening  -     azithromycin (Z-SHAWN) 250 MG tablet; Take 2 tablets by mouth on day 1; Take 1 tablet by mouth on days 2-5  Dispense: 6 tablet; Refill: 0    *Discussed results/diagnosis/plan with patient in clinic. Educated extensively on COVID19. Answered all of patient's questions and concerns. Patient was given strict ED instructions. Patient verbally understood and agreed with treatment plan.       Patient Instructions   Instructions for Patients with Confirmed or Suspected COVID-19    If you are awaiting your test result, you will either be called or it will be released to the patient portal.  If you have any questions about your test, please visit [email or web address] or call our COVID-19 information line at [phone].      Instructions for non-hospitalized or discharged patients with confirmed or suspected COVID-19:       Stay home except to get medical care.    Separate yourself from other people and animals in your home.    Call ahead before visiting your doctor.    Wear a face mask.    Cover your coughs and sneezes.    Clean your hands often.    Avoid sharing personal household items.    Clean all high-touch surfaces every day.    Monitor your symptoms. Seek prompt medical attention if your illness is worsening (e.g., difficulty breathing). Before seeking care, call your healthcare provider.    If you have a medical emergency and must call 911, notify the dispatcher  that you have or are being evaluated for COVID-19. If possible, put on a face mask before emergency medical services arrive.    Use the following symptom-based strategy to return to normal activity following a suspected or confirmed case of COVID-19. Continue isolation until:   o At least 3 days (72 hours) have passed since recovery defined as resolution of fever without the use of fever-reducing medications and improvement in respiratory symptoms (e.g. cough, shortness of breath), and   o At least 10 days have passed since the first positive test.       As one of the next steps, you will receive a call or text from the [place] Department of Health ([facility] COVID-19 Tracing Team. See the contact information below so you know not to ignore the health departments call. It is important that you contact them back immediately so they can help.     Contact Tracer Number:  [phone]  Caller ID for most carriers: [facility]    What is contact tracing?   Contact tracing is a process that helps identify everyone who has been in close contact with an infected person. Contact tracers let those people know they may have been exposed and guide them on next steps. Confidentiality is important for everyone; no one will be told who may have exposed them to the virus.   Your involvement is important. The more we know about where and how this virus is spreading, the better chance we have at stopping it from spreading further.  What does exposure mean?   Exposure means you have been within 6 feet for more than 15 minutes with a person who has or had COVID-19.  What kind of questions do the contact tracers ask?   A contact tracer will confirm your basic contact information including name, address, phone number, and next of kin, as well as asking about any symptoms you may have had. Theyll also ask you how you think you may have gotten sick, such as places where you may have been exposed to the virus, and people you were  with. Those names will never be shared with anyone outside of that call, and will only be used to help trace and stop the spread of the virus.   I have privacy concerns. How will the state use my information?   Your privacy about your health is important. All calls are completed using call centers that use the appropriate health privacy protection measures (HIPAA compliance), meaning that your patient information is safe. No one will ever ask you any questions related to immigration status. Your health comes first.   Do I have to participate?   You do not have to participate, but we strongly encourage you to. Contact tracing can help us catch and control new outbreaks as theyre developing to keep your friends and family safe.   What if I dont hear from anyone?   If you dont receive a call within 24 hours, you can call the number above right away to inquire about your status. That line is open from 8:00 am - 8:00 p.m., 7 days a week.  Contact tracing saves lives! Together, we have the power to beat this virus and keep our loved ones and neighbors safe.       Instructions for household members, intimate partners and caregivers in a non-healthcare setting of a patient with confirmed or suspected COVID-19:         Close contacts should monitor their health and call their healthcare provider right away if they develop symptoms suggestive of COVID-19 (e.g., fever, cough, shortness of breath).    Stay home except to get medical care. Separate yourself from other people and animals in the home.   Monitor the patients symptoms. If the patient is getting sicker, call his or her healthcare provider. If the patient has a medical emergency and you need to call 911, notify the dispatch personnel that the patient has or is being evaluated for COVID-19.    Wear a facemask when around other people such as sharing a room or vehicle and before entering a healthcare provider's office.   Cover coughs and sneezes with a  tissue. Throw used tissues in a lined trash can immediately and wash hands.   Clean hands often with soap and water for at least 20 seconds or with an alcohol-based hand , rubbing hands together until they feel dry. Avoid touching your eyes, nose, and mouth with unwashed hands.   Clean all high-touch; surfaces every day, including counters, tabletops, doorknobs, bathroom fixtures, toilets, phones, keyboards, tablets, bedside tables, etc. Use a household cleaning spray or wipe according to label instructions.   Avoid sharing personal household items such as dishes, drinking glasses, cups, towels, bedding, etc. After these items are used, they should be washed thoroughly with soap and water.   Continue isolation until:   At least 3 days (72 hours) have passed since recovery defined as resolution of fever without the use of fever-reducing medications and improvement in respiratory symptoms (e.g. cough, shortness of breath), and    At least 10 days have passed since the patients first positive test.    https://www.cdc.gov/coronavirus/2019-ncov/your-health/index.htm    *We will call you with your COVID test results within 3-5 days once they arrive.      *Please go immediately to the Emergency Department if you develop shortness of breath, chest pain, and uncontrollable fever above 100.4F            SOCIAL DISTANCE + WEAR A MASK :)    Below are suggestions for symptomatic relief:    - Take Antibiotics as prescribed on a full stomach until you complete entire course  - Tylenol every 4 hours OR ibuprofen every 6 hours as needed for pain/fever/chills/body aches  - Salt water gargles to soothe throat pain.  - Chloroseptic spray also helps to numb throat pain.  - Warm face compresses to help with facial sinus pain/pressure.  - Vicks vapor rub at night.  - Flonase OTC nasal spray for nasal congestion.  - Simple foods like chicken noodle soup, smoothies, hot tea with lemon and honey  - If you were not given a  prescription cough medication, then Delsym OTC helps with coughing at night  - Zyrtec/Claritin during the day & Benadryl at night may help with any allergies     If you DO NOT have Hypertension or any history of palpitations, it is ok to take over the counter Sudafed or Mucinex D or Allegra-D/Claritin-D/Zyrtec-D.  If you do take one of the above, it is ok to combine that with plain over the counter Mucinex or Allegra or Claritin or Zyrtec. If, for example, you are taking Zyrtec -D, you can combine that with Mucinex, but not Mucinex-D.  If you are taking Mucinex-D, you can combine that with plain Allegra or Claritin or Zyrtec.     If you DO have Hypertension or palpitations, it is safe to take Coricidin HBP for relief of sinus symptoms.     Please follow up with your primary care provider within 2-5 days if your signs and symptoms have not resolved or worsen.

## 2020-07-30 NOTE — PATIENT INSTRUCTIONS
Instructions for Patients with Confirmed or Suspected COVID-19    If you are awaiting your test result, you will either be called or it will be released to the patient portal.  If you have any questions about your test, please visit [email or web address] or call our COVID-19 information line at [phone].      Instructions for non-hospitalized or discharged patients with confirmed or suspected COVID-19:       Stay home except to get medical care.    Separate yourself from other people and animals in your home.    Call ahead before visiting your doctor.    Wear a face mask.    Cover your coughs and sneezes.    Clean your hands often.    Avoid sharing personal household items.    Clean all high-touch surfaces every day.    Monitor your symptoms. Seek prompt medical attention if your illness is worsening (e.g., difficulty breathing). Before seeking care, call your healthcare provider.    If you have a medical emergency and must call 911, notify the dispatcher that you have or are being evaluated for COVID-19. If possible, put on a face mask before emergency medical services arrive.    Use the following symptom-based strategy to return to normal activity following a suspected or confirmed case of COVID-19. Continue isolation until:   o At least 3 days (72 hours) have passed since recovery defined as resolution of fever without the use of fever-reducing medications and improvement in respiratory symptoms (e.g. cough, shortness of breath), and   o At least 10 days have passed since the first positive test.       As one of the next steps, you will receive a call or text from the [place] Department of Health ([facility]) COVID-19 Tracing Team. See the contact information below so you know not to ignore the health departments call. It is important that you contact them back immediately so they can help.     Contact Tracer Number:  [phone]  Caller ID for most carriers: [place] [facility]    What is  contact tracing?   Contact tracing is a process that helps identify everyone who has been in close contact with an infected person. Contact tracers let those people know they may have been exposed and guide them on next steps. Confidentiality is important for everyone; no one will be told who may have exposed them to the virus.   Your involvement is important. The more we know about where and how this virus is spreading, the better chance we have at stopping it from spreading further.  What does exposure mean?   Exposure means you have been within 6 feet for more than 15 minutes with a person who has or had COVID-19.  What kind of questions do the contact tracers ask?   A contact tracer will confirm your basic contact information including name, address, phone number, and next of kin, as well as asking about any symptoms you may have had. Theyll also ask you how you think you may have gotten sick, such as places where you may have been exposed to the virus, and people you were with. Those names will never be shared with anyone outside of that call, and will only be used to help trace and stop the spread of the virus.   I have privacy concerns. How will the state use my information?   Your privacy about your health is important. All calls are completed using call centers that use the appropriate health privacy protection measures (HIPAA compliance), meaning that your patient information is safe. No one will ever ask you any questions related to immigration status. Your health comes first.   Do I have to participate?   You do not have to participate, but we strongly encourage you to. Contact tracing can help us catch and control new outbreaks as theyre developing to keep your friends and family safe.   What if I dont hear from anyone?   If you dont receive a call within 24 hours, you can call the number above right away to inquire about your status. That line is open from 8:00 am - 8:00 p.m., 7 days a  week.  Contact tracing saves lives! Together, we have the power to beat this virus and keep our loved ones and neighbors safe.       Instructions for household members, intimate partners and caregivers in a non-healthcare setting of a patient with confirmed or suspected COVID-19:         Close contacts should monitor their health and call their healthcare provider right away if they develop symptoms suggestive of COVID-19 (e.g., fever, cough, shortness of breath).    Stay home except to get medical care. Separate yourself from other people and animals in the home.   Monitor the patients symptoms. If the patient is getting sicker, call his or her healthcare provider. If the patient has a medical emergency and you need to call 911, notify the dispatch personnel that the patient has or is being evaluated for COVID-19.    Wear a facemask when around other people such as sharing a room or vehicle and before entering a healthcare provider's office.   Cover coughs and sneezes with a tissue. Throw used tissues in a lined trash can immediately and wash hands.   Clean hands often with soap and water for at least 20 seconds or with an alcohol-based hand , rubbing hands together until they feel dry. Avoid touching your eyes, nose, and mouth with unwashed hands.   Clean all high-touch; surfaces every day, including counters, tabletops, doorknobs, bathroom fixtures, toilets, phones, keyboards, tablets, bedside tables, etc. Use a household cleaning spray or wipe according to label instructions.   Avoid sharing personal household items such as dishes, drinking glasses, cups, towels, bedding, etc. After these items are used, they should be washed thoroughly with soap and water.   Continue isolation until:   At least 3 days (72 hours) have passed since recovery defined as resolution of fever without the use of fever-reducing medications and improvement in respiratory symptoms (e.g. cough, shortness of breath),  and    At least 10 days have passed since the patients first positive test.    https://www.cdc.gov/coronavirus/2019-ncov/your-health/index.htm    *We will call you with your COVID test results within 3-5 days once they arrive.      *Please go immediately to the Emergency Department if you develop shortness of breath, chest pain, and uncontrollable fever above 100.4F            SOCIAL DISTANCE + WEAR A MASK :)    Below are suggestions for symptomatic relief:    - Take Antibiotics as prescribed on a full stomach until you complete entire course  - Tylenol every 4 hours OR ibuprofen every 6 hours as needed for pain/fever/chills/body aches  - Salt water gargles to soothe throat pain.  - Chloroseptic spray also helps to numb throat pain.  - Warm face compresses to help with facial sinus pain/pressure.  - Vicks vapor rub at night.  - Flonase OTC nasal spray for nasal congestion.  - Simple foods like chicken noodle soup, smoothies, hot tea with lemon and honey  - If you were not given a prescription cough medication, then Delsym OTC helps with coughing at night  - Zyrtec/Claritin during the day & Benadryl at night may help with any allergies     If you DO NOT have Hypertension or any history of palpitations, it is ok to take over the counter Sudafed or Mucinex D or Allegra-D/Claritin-D/Zyrtec-D.  If you do take one of the above, it is ok to combine that with plain over the counter Mucinex or Allegra or Claritin or Zyrtec. If, for example, you are taking Zyrtec -D, you can combine that with Mucinex, but not Mucinex-D.  If you are taking Mucinex-D, you can combine that with plain Allegra or Claritin or Zyrtec.     If you DO have Hypertension or palpitations, it is safe to take Coricidin HBP for relief of sinus symptoms.     Please follow up with your primary care provider within 2-5 days if your signs and symptoms have not resolved or worsen.

## 2020-07-31 ENCOUNTER — TELEPHONE (OUTPATIENT)
Dept: URGENT CARE | Facility: CLINIC | Age: 70
End: 2020-07-31

## 2020-07-31 LAB — SARS-COV-2 RNA RESP QL NAA+PROBE: NOT DETECTED

## 2020-08-21 ENCOUNTER — TELEPHONE (OUTPATIENT)
Dept: DERMATOLOGY | Facility: CLINIC | Age: 70
End: 2020-08-21

## 2020-08-21 NOTE — TELEPHONE ENCOUNTER
Per last note pt was suppose to send pictures of area of concern. No picture found in Media. Will reschedule pt per phone call.    KE

## 2020-08-21 NOTE — TELEPHONE ENCOUNTER
----- Message from Letitia Ngo sent at 8/21/2020 12:25 PM CDT -----  Contact: self @ [phone]  Pt says she had an appt scheduled for today at 1:00 but nothing has ever been scheduled.  I scheduled for the 1st available in October but she would like to be seen sooner.  Pls call.

## 2020-08-24 ENCOUNTER — PATIENT MESSAGE (OUTPATIENT)
Dept: FAMILY MEDICINE | Facility: CLINIC | Age: 70
End: 2020-08-24

## 2020-08-27 RX ORDER — HYDROCHLOROTHIAZIDE 25 MG/1
25 TABLET ORAL DAILY
Qty: 90 TABLET | Refills: 0 | Status: SHIPPED | OUTPATIENT
Start: 2020-08-27 | End: 2020-12-01

## 2020-09-09 ENCOUNTER — OFFICE VISIT (OUTPATIENT)
Dept: URGENT CARE | Facility: CLINIC | Age: 70
End: 2020-09-09
Payer: MEDICARE

## 2020-09-09 ENCOUNTER — TELEPHONE (OUTPATIENT)
Dept: DERMATOLOGY | Facility: CLINIC | Age: 70
End: 2020-09-09

## 2020-09-09 VITALS
RESPIRATION RATE: 16 BRPM | OXYGEN SATURATION: 96 % | TEMPERATURE: 97 F | DIASTOLIC BLOOD PRESSURE: 64 MMHG | SYSTOLIC BLOOD PRESSURE: 140 MMHG | HEART RATE: 77 BPM

## 2020-09-09 DIAGNOSIS — L30.8 PRURITIC DERMATITIS: Primary | ICD-10-CM

## 2020-09-09 PROCEDURE — 99214 PR OFFICE/OUTPT VISIT, EST, LEVL IV, 30-39 MIN: ICD-10-PCS | Mod: S$GLB,,, | Performed by: PHYSICIAN ASSISTANT

## 2020-09-09 PROCEDURE — 99214 OFFICE O/P EST MOD 30 MIN: CPT | Mod: S$GLB,,, | Performed by: PHYSICIAN ASSISTANT

## 2020-09-09 RX ORDER — METHYLPREDNISOLONE 4 MG/1
4 TABLET ORAL DAILY
Qty: 1 PACKAGE | Refills: 0 | Status: SHIPPED | OUTPATIENT
Start: 2020-09-09 | End: 2020-09-15

## 2020-09-09 NOTE — TELEPHONE ENCOUNTER
----- Message from Elisa Pinto MA sent at 9/9/2020  4:22 PM CDT -----  Regarding: FW: LAST    ----- Message -----  From: Gayathri Hurst  Sent: 9/9/2020  11:38 AM CDT  To: [facility] Triage  Subject: FST                                              Reason: Patient states she has a rash that is itching her. States she needs to be seen today. Also states she stung by a bee last Wednesday          Contact: [phone]

## 2020-09-10 ENCOUNTER — OFFICE VISIT (OUTPATIENT)
Dept: OPTOMETRY | Facility: CLINIC | Age: 70
End: 2020-09-10
Payer: COMMERCIAL

## 2020-09-10 DIAGNOSIS — H25.13 NUCLEAR SCLEROSIS OF BOTH EYES: ICD-10-CM

## 2020-09-10 DIAGNOSIS — H52.7 REFRACTIVE ERROR: ICD-10-CM

## 2020-09-10 DIAGNOSIS — Z01.00 ROUTINE EYE EXAM: Primary | ICD-10-CM

## 2020-09-10 PROCEDURE — 92014 COMPRE OPH EXAM EST PT 1/>: CPT | Mod: HCNC,S$GLB,, | Performed by: OPTOMETRIST

## 2020-09-10 PROCEDURE — 99999 PR PBB SHADOW E&M-EST. PATIENT-LVL III: ICD-10-PCS | Mod: PBBFAC,HCNC,, | Performed by: OPTOMETRIST

## 2020-09-10 PROCEDURE — 99999 PR PBB SHADOW E&M-EST. PATIENT-LVL III: CPT | Mod: PBBFAC,HCNC,, | Performed by: OPTOMETRIST

## 2020-09-10 PROCEDURE — 92014 PR EYE EXAM, EST PATIENT,COMPREHESV: ICD-10-PCS | Mod: HCNC,S$GLB,, | Performed by: OPTOMETRIST

## 2020-09-10 PROCEDURE — 92015 DETERMINE REFRACTIVE STATE: CPT | Mod: HCNC,S$GLB,, | Performed by: OPTOMETRIST

## 2020-09-10 PROCEDURE — 92015 PR REFRACTION: ICD-10-PCS | Mod: HCNC,S$GLB,, | Performed by: OPTOMETRIST

## 2020-09-10 NOTE — PATIENT INSTRUCTIONS
Correcting Presbyopia: Glasses    Glasses can correct presbyopia. They focus the image back onto the retina. This way, you can see an object clearly. There are several kinds of glasses you can choose from.    Glasses  Presbyopia is most often corrected by wearing glasses. If you have no other vision problems, you may only need reading glasses. If you are also nearsighted or farsighted, your eye doctor can prescribe bifocals, trifocals, or progressive lenses.        Bifocals correct near and far vision (bimeans two). A small half[place] in the lower part of the lens magnifies objects that are close. In some cases, the whole lower half of the lens magnifies these objects.        Trifocals correct near, middle, and far vision (tri means three). The lower part of the lens has two magnifying hart. One magnifies near objects. The other magnifies objects that are about an arms length away.        Progressive lenses change magnifying power from near to middle to far vision gradually. You do not notice a change from one power to the next. And you do not see any lines on the lenses. But the sides of the lenses will be blurry.    © 3206-0978 [employer]. [address] [place] [address], [place] [address]. All rights reserved. This information is not intended as a substitute for professional medical care. Always follow your healthcare professional's instructions.

## 2020-09-10 NOTE — PROGRESS NOTES
Subjective:       Patient ID: Aleksandra Santana is a 70 y.o. female      Chief Complaint   Patient presents with    Concerns About Ocular Health     History of Present Illness  Dls: 2/6/19 Dr. Amezcua    71 y/o female presents today for ocular health check.  Pt c/o blurry vision at distance and near ou.   Pt wears single vision glasses for reading     No tearing  No itching  + off/on burning  No pain   + ha's  No floaters  No flashes    Eye meds  systane balance ou prn     Assessment/Plan:     1. Routine eye exam  [facility] vision    2. Nuclear sclerosis of both eyes  Educated pt on presence of cataracts and effects on vision. No surgery at this time. Recheck in one year.    3. Refractive error  Educated patient on refractive error and discussed lens options. Dispensed updated spectacle Rx. Educated about adaptation period to new specs.    Eyeglass Final Rx     Eyeglass Final Rx       Sphere Cylinder Axis Add    Right +1.75 +0.75 010 +2.25    Left +1.75 +0.75 165 +2.25    Expiration Date: 9/11/2021                  Follow up in about 1 year (around 9/10/2021).

## 2020-09-10 NOTE — PROGRESS NOTES
Subjective:       Patient ID: Aleksandra Santana is a 70 y.o. female.    Vitals:  temperature is 97.2 °F (36.2 °C). Her blood pressure is 140/64 (abnormal) and her pulse is 77. Her respiration is 16 and oxygen saturation is 96%.     Chief Complaint: Rash    Patient presenting with rash on the left leg that began yesterday morning at 12am. States that she was stung by a bee last Wednesday while pulling weeds and had severe swelling/redness in the same area. Reports that the last time she was stung by a bee, she developed an infection so she started taking Augmentin. Reports that the redness and swelling went down but she still has a hard lump where she was stung. Yesterday she began developed an itchy rash around the site which has been spreading up and down her thigh. She's been taking Claritin and applying anti-itch cream without relief. Denies any pain or burning sensation, changes in soaps/lotions/moisturizers/diet/medications.    Rash  This is a new problem. The current episode started in the past 7 days. The problem is unchanged. The affected locations include the left lower leg and left upper leg. The rash is characterized by itchiness and redness. Pertinent negatives include no congestion, cough, diarrhea, fatigue, fever, shortness of breath, sore throat or vomiting. Past treatments include anti-itch cream, antihistamine and antibiotics.       Constitution: Negative for chills, fatigue and fever.   HENT: Negative for congestion and sore throat.    Neck: Negative for painful lymph nodes.   Cardiovascular: Negative for chest pain and leg swelling.   Eyes: Negative for double vision and blurred vision.   Respiratory: Negative for cough and shortness of breath.    Gastrointestinal: Negative for nausea, vomiting and diarrhea.   Genitourinary: Negative for dysuria, frequency, urgency and history of kidney stones.   Musculoskeletal: Negative for joint pain, joint swelling, muscle cramps and muscle ache.   Skin: Negative  for color change, pale, rash, erythema and bruising.   Allergic/Immunologic: Negative for seasonal allergies.   Neurological: Negative for dizziness, history of vertigo, light-headedness, passing out and headaches.   Hematologic/Lymphatic: Negative for swollen lymph nodes.   Psychiatric/Behavioral: Negative for nervous/anxious, sleep disturbance and depression. The patient is not nervous/anxious.        Objective:      Physical Exam   Constitutional: She is oriented to person, place, and time. She appears well-developed.   HENT:   Head: Normocephalic and atraumatic. Head is without abrasion, without contusion and without laceration.   Ears:   Right Ear: External ear normal.   Left Ear: External ear normal.   Nose: Nose normal.   Mouth/Throat: Oropharynx is clear and moist and mucous membranes are normal.   Eyes: Pupils are equal, round, and reactive to light. Conjunctivae, EOM and lids are normal.   Neck: Trachea normal, full passive range of motion without pain and phonation normal. Neck supple.   Cardiovascular: Normal rate, regular rhythm and normal heart sounds.   Pulmonary/Chest: Effort normal and breath sounds normal. No stridor. No respiratory distress.   Musculoskeletal: Normal range of motion.   Neurological: She is alert and oriented to person, place, and time.   Skin: Skin is warm, dry, intact, rash and papular. Capillary refill takes less than 2 seconds. abrasion, burn, bruising, erythema and ecchymosis     Psychiatric: Her speech is normal and behavior is normal. Judgment and thought content normal.   Nursing note and vitals reviewed.        Assessment:       1. Pruritic dermatitis        Plan:         Pruritic dermatitis  -     methylPREDNISolone (MEDROL DOSEPACK) 4 mg tablet; Take 1 tablet (4 mg total) by mouth once daily. use as directed (take 5 then 4 then 3 then 2 then 1) for 6 days  Dispense: 1 Package; Refill: 0      Patient Instructions   General Discharge Instructions     Continue taking  Claritin in addition to oral steroids prescribed today. Apply triamcinolone cream twice daily to the affected area.    If you were prescribed a narcotic or controlled medication, do not drive or operate heavy equipment or machinery while taking these medications.  If you were prescribed antibiotics, please take them to completion.  You must understand that you've received an Urgent Care treatment only and that you may be released before all your medical problems are known or treated. You, the patient, will arrange for follow up care as instructed.  Follow up with your PCP or specialty clinic as directed in the next 1-2 weeks if not improved or as needed.  You can call [phone] to schedule an appointment with the appropriate provider.  If your condition worsens we recommend that you receive another evaluation at the emergency room immediately or contact your primary medical clinics after hours call service to discuss your concerns.  Please return here or go to the Emergency Department for any concerns or worsening of condition.      Nonspecific Dermatitis  Dermatitis is a skin rash caused by something that touches the skin and makes it irritated and inflamed.  Your skin may be red, swollen, dry, and may be cracked. Blisters may form and ooze. The rash will itch.  Dermatitis can form on the face and neck, backs of hands, forearms, genitals, and lower legs. Dermatitis is not passed from person to person.  Talk with your health care provider about what may have caused the rash. Common things that cause skin allergies are metal in jewelry, plants like poison ivy or poison oak, and certain skin care products. You will need to avoid the source of your rash in the future to prevent it from coming back. In some cases, the cause of the dermatitis may not be found.  Treatment is done to relieve itching and prevent the rash from coming back. The rash should go away in a few days to a few weeks.  Home care  The health care  provider may prescribe medications to relieve swelling and itching. Follow all instructions when using these medications.  · Avoid anything that heats up your skin, such as hot showers or baths, or direct sunlight. This can make itching worse.  · Stay away from whatever you think caused the rash.  · Bathe in warm, not hot, water. Apply a moisturizing lotion after bathing to prevent dry skin.  · Avoid skin irritants such as wool or silk clothing, grease, oils, harsh soaps, and detergents.  · Apply cold compresses to soothe your sores to help relieve your symptoms. Do this for 30 minutes 3 to 4 times a day. You can make a cold compress by soaking a cloth in cold water. Squeeze out excess water. You can add colloidal oatmeal to the water to help reduce itching. For severe itching in a small area, apply an ice pack wrapped in a thin towel. Do this for 20 minutes 3 to 4 times a day.  · You can also help relieve large areas of itching by taking a lukewarm bath with colloidal oatmeal added to the water.  · Use hydrocortisone cream for redness and irritation, unless another medicine was prescribed. You can also use benzocaine anesthetic cream or spray.  · Use oral diphenhydramine to help reduce itching. This is an antihistamine you can buy at drug and grocery stores. It can make you sleepy, so use lower doses during the daytime. Or you can use loratadine. This is an antihistamine that will not make you sleepy. Dont use diphenhydramine if you have glaucoma or have trouble urinating because of an enlarged prostate.  · Wash your hands or use an antibacterial gel often to prevent the spread of the rash.  Follow-up care  Follow up with your health care provider. Make an appointment with your health care provider if your symptoms do not get better in the next 1 to 2 weeks.  When to seek medical advice  Call your health care provider right away if any of these occur:  · Spreading of the rash to other parts of your body  · Severe  swelling of your face, eyelids, mouth, throat or tongue  · Trouble urinating due to swelling in the genital area  · Fever of 100.4°F (38°C) or higher  · Redness or swelling that gets worse  · Pain that gets worse  · Foul-smelling fluid leaking from the skin  · Yellow-brown crusts on the open blisters  · Joint pain   Date Last Reviewed: 7/23/2014  © 0146-4244 [employer]. [address] [place] [address], [place] [address]. All rights reserved. This information is not intended as a substitute for professional medical care. Always follow your healthcare professional's instructions.

## 2020-09-10 NOTE — PATIENT INSTRUCTIONS
General Discharge Instructions     Continue taking Claritin in addition to oral steroids prescribed today. Apply triamcinolone cream twice daily to the affected area.    If you were prescribed a narcotic or controlled medication, do not drive or operate heavy equipment or machinery while taking these medications.  If you were prescribed antibiotics, please take them to completion.  You must understand that you've received an Urgent Care treatment only and that you may be released before all your medical problems are known or treated. You, the patient, will arrange for follow up care as instructed.  Follow up with your PCP or specialty clinic as directed in the next 1-2 weeks if not improved or as needed.  You can call [phone] to schedule an appointment with the appropriate provider.  If your condition worsens we recommend that you receive another evaluation at the emergency room immediately or contact your primary medical clinics after hours call service to discuss your concerns.  Please return here or go to the Emergency Department for any concerns or worsening of condition.      Nonspecific Dermatitis  Dermatitis is a skin rash caused by something that touches the skin and makes it irritated and inflamed.  Your skin may be red, swollen, dry, and may be cracked. Blisters may form and ooze. The rash will itch.  Dermatitis can form on the face and neck, backs of hands, forearms, genitals, and lower legs. Dermatitis is not passed from person to person.  Talk with your health care provider about what may have caused the rash. Common things that cause skin allergies are metal in jewelry, plants like poison ivy or poison oak, and certain skin care products. You will need to avoid the source of your rash in the future to prevent it from coming back. In some cases, the cause of the dermatitis may not be found.  Treatment is done to relieve itching and prevent the rash from coming back. The rash should go away in a  few days to a few weeks.  Home care  The health care provider may prescribe medications to relieve swelling and itching. Follow all instructions when using these medications.  · Avoid anything that heats up your skin, such as hot showers or baths, or direct sunlight. This can make itching worse.  · Stay away from whatever you think caused the rash.  · Bathe in warm, not hot, water. Apply a moisturizing lotion after bathing to prevent dry skin.  · Avoid skin irritants such as wool or silk clothing, grease, oils, harsh soaps, and detergents.  · Apply cold compresses to soothe your sores to help relieve your symptoms. Do this for 30 minutes 3 to 4 times a day. You can make a cold compress by soaking a cloth in cold water. Squeeze out excess water. You can add colloidal oatmeal to the water to help reduce itching. For severe itching in a small area, apply an ice pack wrapped in a thin towel. Do this for 20 minutes 3 to 4 times a day.  · You can also help relieve large areas of itching by taking a lukewarm bath with colloidal oatmeal added to the water.  · Use hydrocortisone cream for redness and irritation, unless another medicine was prescribed. You can also use benzocaine anesthetic cream or spray.  · Use oral diphenhydramine to help reduce itching. This is an antihistamine you can buy at drug and grocery stores. It can make you sleepy, so use lower doses during the daytime. Or you can use loratadine. This is an antihistamine that will not make you sleepy. Dont use diphenhydramine if you have glaucoma or have trouble urinating because of an enlarged prostate.  · Wash your hands or use an antibacterial gel often to prevent the spread of the rash.  Follow-up care  Follow up with your health care provider. Make an appointment with your health care provider if your symptoms do not get better in the next 1 to 2 weeks.  When to seek medical advice  Call your health care provider right away if any of these  occur:  · Spreading of the rash to other parts of your body  · Severe swelling of your face, eyelids, mouth, throat or tongue  · Trouble urinating due to swelling in the genital area  · Fever of 100.4°F (38°C) or higher  · Redness or swelling that gets worse  · Pain that gets worse  · Foul-smelling fluid leaking from the skin  · Yellow-brown crusts on the open blisters  · Joint pain   Date Last Reviewed: 7/23/2014 © 2000-2017 [email or web address]. [address] [place] [address], [place] [id number]. All rights reserved. This information is not intended as a substitute for professional medical care. Always follow your healthcare professional's instructions.

## 2020-09-11 ENCOUNTER — PATIENT OUTREACH (OUTPATIENT)
Dept: ADMINISTRATIVE | Facility: HOSPITAL | Age: 70
End: 2020-09-11

## 2020-09-11 DIAGNOSIS — M81.0 OSTEOPOROSIS, UNSPECIFIED OSTEOPOROSIS TYPE, UNSPECIFIED PATHOLOGICAL FRACTURE PRESENCE: ICD-10-CM

## 2020-09-11 DIAGNOSIS — Z12.31 ENCOUNTER FOR SCREENING MAMMOGRAM FOR MALIGNANT NEOPLASM OF BREAST: ICD-10-CM

## 2020-09-11 DIAGNOSIS — Z12.39 SCREENING FOR BREAST CANCER: ICD-10-CM

## 2020-09-11 DIAGNOSIS — Z12.11 SCREEN FOR COLON CANCER: Primary | ICD-10-CM

## 2020-09-21 ENCOUNTER — HOSPITAL ENCOUNTER (OUTPATIENT)
Dept: RADIOLOGY | Facility: HOSPITAL | Age: 70
Discharge: HOME | End: 2020-09-21
Attending: INTERNAL MEDICINE
Payer: MEDICARE

## 2020-09-21 DIAGNOSIS — Z12.31 ENCOUNTER FOR SCREENING MAMMOGRAM FOR MALIGNANT NEOPLASM OF BREAST: ICD-10-CM

## 2020-09-21 DIAGNOSIS — Z12.39 SCREENING FOR BREAST CANCER: ICD-10-CM

## 2020-09-21 PROCEDURE — 77067 SCR MAMMO BI INCL CAD: CPT | Mod: 26,HCNC,, | Performed by: RADIOLOGY

## 2020-09-21 PROCEDURE — 77063 BREAST TOMOSYNTHESIS BI: CPT | Mod: 26,HCNC,, | Performed by: RADIOLOGY

## 2020-09-21 PROCEDURE — 77067 SCR MAMMO BI INCL CAD: CPT | Mod: TC,HCNC,PO

## 2020-09-21 PROCEDURE — 77063 MAMMO DIGITAL SCREENING BILAT WITH TOMO: ICD-10-PCS | Mod: 26,HCNC,, | Performed by: RADIOLOGY

## 2020-09-21 PROCEDURE — 77067 MAMMO DIGITAL SCREENING BILAT WITH TOMO: ICD-10-PCS | Mod: 26,HCNC,, | Performed by: RADIOLOGY

## 2020-09-29 ENCOUNTER — PATIENT MESSAGE (OUTPATIENT)
Dept: OTHER | Facility: OTHER | Age: 70
End: 2020-09-29

## 2020-10-05 RX ORDER — AZELASTINE 1 MG/ML
SPRAY, METERED NASAL
Qty: 20 ML | Refills: 12 | Status: SHIPPED | OUTPATIENT
Start: 2020-10-05 | End: 2022-02-27

## 2020-10-14 ENCOUNTER — OFFICE VISIT (OUTPATIENT)
Dept: DERMATOLOGY | Facility: CLINIC | Age: 70
End: 2020-10-14
Payer: MEDICARE

## 2020-10-14 DIAGNOSIS — D48.5 NEOPLASM OF UNCERTAIN BEHAVIOR OF SKIN: Primary | ICD-10-CM

## 2020-10-14 DIAGNOSIS — L82.1 SK (SEBORRHEIC KERATOSIS): ICD-10-CM

## 2020-10-14 PROCEDURE — 1101F PT FALLS ASSESS-DOCD LE1/YR: CPT | Mod: HCNC,CPTII,S$GLB, | Performed by: DERMATOLOGY

## 2020-10-14 PROCEDURE — 88305 TISSUE EXAM BY PATHOLOGIST: CPT | Mod: HCNC | Performed by: PATHOLOGY

## 2020-10-14 PROCEDURE — 11102 PR TANGENTIAL BIOPSY, SKIN, SINGLE LESION: ICD-10-PCS | Mod: HCNC,S$GLB,, | Performed by: DERMATOLOGY

## 2020-10-14 PROCEDURE — 1126F AMNT PAIN NOTED NONE PRSNT: CPT | Mod: HCNC,S$GLB,, | Performed by: DERMATOLOGY

## 2020-10-14 PROCEDURE — 88305 TISSUE EXAM BY PATHOLOGIST: CPT | Mod: 26,HCNC,, | Performed by: PATHOLOGY

## 2020-10-14 PROCEDURE — 99213 PR OFFICE/OUTPT VISIT, EST, LEVL III, 20-29 MIN: ICD-10-PCS | Mod: 25,HCNC,S$GLB, | Performed by: DERMATOLOGY

## 2020-10-14 PROCEDURE — 99999 PR PBB SHADOW E&M-EST. PATIENT-LVL IV: ICD-10-PCS | Mod: PBBFAC,HCNC,, | Performed by: DERMATOLOGY

## 2020-10-14 PROCEDURE — 11102 TANGNTL BX SKIN SINGLE LES: CPT | Mod: HCNC,S$GLB,, | Performed by: DERMATOLOGY

## 2020-10-14 PROCEDURE — 1159F PR MEDICATION LIST DOCUMENTED IN MEDICAL RECORD: ICD-10-PCS | Mod: HCNC,S$GLB,, | Performed by: DERMATOLOGY

## 2020-10-14 PROCEDURE — 88342 IMHCHEM/IMCYTCHM 1ST ANTB: CPT | Mod: HCNC | Performed by: PATHOLOGY

## 2020-10-14 PROCEDURE — 88342 CHG IMMUNOCYTOCHEMISTRY: ICD-10-PCS | Mod: 26,HCNC,, | Performed by: PATHOLOGY

## 2020-10-14 PROCEDURE — 1101F PR PT FALLS ASSESS DOC 0-1 FALLS W/OUT INJ PAST YR: ICD-10-PCS | Mod: HCNC,CPTII,S$GLB, | Performed by: DERMATOLOGY

## 2020-10-14 PROCEDURE — 99999 PR PBB SHADOW E&M-EST. PATIENT-LVL IV: CPT | Mod: PBBFAC,HCNC,, | Performed by: DERMATOLOGY

## 2020-10-14 PROCEDURE — 1126F PR PAIN SEVERITY QUANTIFIED, NO PAIN PRESENT: ICD-10-PCS | Mod: HCNC,S$GLB,, | Performed by: DERMATOLOGY

## 2020-10-14 PROCEDURE — 1159F MED LIST DOCD IN RCRD: CPT | Mod: HCNC,S$GLB,, | Performed by: DERMATOLOGY

## 2020-10-14 PROCEDURE — 99213 OFFICE O/P EST LOW 20 MIN: CPT | Mod: 25,HCNC,S$GLB, | Performed by: DERMATOLOGY

## 2020-10-14 PROCEDURE — 88342 IMHCHEM/IMCYTCHM 1ST ANTB: CPT | Mod: 26,HCNC,, | Performed by: PATHOLOGY

## 2020-10-14 PROCEDURE — 88305 TISSUE EXAM BY PATHOLOGIST: ICD-10-PCS | Mod: 26,HCNC,, | Performed by: PATHOLOGY

## 2020-10-14 NOTE — PROGRESS NOTES
Subjective:       Patient ID:  Aleksandra Santana is a 70 y.o. female who presents for   Chief Complaint   Patient presents with    Spot     Pt presents today for spot,calf, dry, itch at times, x 1 year, Tx. Abt once    Spot     recheck spot on back     HPI  Pt had a spot that occurred on her back adjacent to the previous biopsy site that would sting, but it appears to have come off.  Also c/o dry spot on calf which itches at times, present x 1 yr, no current tx.    Review of Systems   Constitutional: Negative for fever, chills, weight loss, weight gain, fatigue, night sweats and malaise.   Respiratory: Negative for cough and shortness of breath.    Skin: Negative for daily sunscreen use, activity-related sunscreen use and wears hat.   Hematologic/Lymphatic: Bruises/bleeds easily.        Objective:    Physical Exam   Constitutional: She appears well-developed and well-nourished.   Neurological: She is alert and oriented to person, place, and time.   Psychiatric: She has a normal mood and affect.   Skin:   Areas Examined (abnormalities noted in diagram):   Head / Face Inspection Performed  Back Inspection Performed  RLE Inspected  LLE Inspection Performed                  Diagram Legend     Erythematous scaling macule/papule c/w actinic keratosis       Vascular papule c/w angioma      Pigmented verrucoid papule/plaque c/w seborrheic keratosis      Yellow umbilicated papule c/w sebaceous hyperplasia      Irregularly shaped tan macule c/w lentigo     1-2 mm smooth white papules consistent with Milia      Movable subcutaneous cyst with punctum c/w epidermal inclusion cyst      Subcutaneous movable cyst c/w pilar cyst      Firm pink to brown papule c/w dermatofibroma      Pedunculated fleshy papule(s) c/w skin tag(s)      Evenly pigmented macule c/w junctional nevus     Mildly variegated pigmented, slightly irregular-bordered macule c/w mildly atypical nevus      Flesh colored to evenly pigmented papule c/w intradermal  nevus       Pink pearly papule/plaque c/w basal cell carcinoma      Erythematous hyperkeratotic cursted plaque c/w SCC      Surgical scar with no sign of skin cancer recurrence      Open and closed comedones      Inflammatory papules and pustules      Verrucoid papule consistent consistent with wart     Erythematous eczematous patches and plaques     Dystrophic onycholytic nail with subungual debris c/w onychomycosis     Umbilicated papule    Erythematous-base heme-crusted tan verrucoid plaque consistent with inflamed seborrheic keratosis     Erythematous Silvery Scaling Plaque c/w Psoriasis     See annotation      Assessment / Plan:      Neoplasm of uncertain behavior of skin  Shave biopsy procedure note:    Shave biopsy performed after verbal consent including risk of infection, scar, recurrence, need for additional treatment of site. Area prepped with alcohol, anesthetized with approximately 1.0cc of 1% lidocaine with epinephrine. Lesional tissue shaved with razor blade. Hemostasis achieved with application of aluminum chloride followed by hyfrecation. No complications. Dressing applied. Wound care explained.    -     Specimen to Pathology, Dermatology  Pathology Orders:     Normal Orders This Visit    Specimen to Pathology, Dermatology     Comments:    Number of Specimens:->1  ------------------------->-------------------------  Spec 1 Procedure:->Biopsy  Spec 1 Clinical Impression:->r/o atypical nevus vs other  Spec 1 Source:->mid back    Questions:    Procedure Type: Dermatology and skin neoplasms    Number of Specimens: 1    ------------------------: -------------------------    Spec 1 Procedure: Biopsy    Spec 1 Clinical Impression: r/o atypical nevus vs other    Spec 1 Source: mid back          SK (seborrheic keratosis)  These are benign inherited growths without a malignant potential. Reassurance given to patient. No treatment is necessary.   Treatment of benign, asymptomatic lesions may be considered  cosmetic.  Warned about risk of hypo- or hyperpigmentation with treatment and risk of recurrence.    Follow up in about 1 year (around 10/14/2021) for skin check or sooner pending biopsy results.

## 2020-10-14 NOTE — PATIENT INSTRUCTIONS
Summer Sun Protection      The [facility] Department of Dermatology would like to remind you of the importance of sun protection all year round and particularly during the summer when the suns rays are the strongest. It has been proven that both acute and chronic sun exposure damages our cells and leads to skin cancer. Beyond skin cancer, the sun causes 90% of the symptoms of pre-mature skin aging, including wrinkles, lentigines (brown spots), and thin, easily bruised skin. Proper sun protection can help prevent these unwanted conditions.    Many patients report that the dont go in the sun. It has been shown that the average person receives 18 hours of incidental sun exposure per week during activities such as walking through parking lots, driving, or sitting next to windows. This accumulates to several bad sunburns per year!    In choosing sunscreen, you want one that protects against both UVA and UVB rays. It is recommended that you use one of SPF 30 or higher. It is important to apply the sunscreen about 20 minutes prior to sun exposure. Most sunscreens are chemical sunscreens and a reaction must take place in the skin so that they are effective. If they are applied and then you are immediately exposed to the sun or start sweating, this reaction has not had time to take place and you are therefore unprotected. Sunscreen needs to be reapplied every 2 hours if you are participating in water sports or sweating. We recommend Elta MD or Neutrogena Ultra Sheer Dry Touch SPF 55 for daily use; however there are many options and it is most important for you to find one that you will use on a consistent basis.    If you have sensitive skin, you may do best with a sunscreen that contains only physical blockers such as titanium dioxide or zinc oxide. These are typically thicker and harder to apply, however they afford very good protection. Neutrogena Sensitive Skin, Blue Lizard Sensitive Skin (pink top) or Neutrogena Pure  and Free are popular ones.     Aside from sunscreen, clothes with UV protection, wide brimmed hats, and sunglasses are other means of sun protection that we recommend.                        [facility] [address] [address]  [place] [address]  Dept: [phone]  Dept Fax: [phone]                                                                               CRYOSURGERY      Your doctor has used a method called cryosurgery to treat your skin condition. Cryosurgery refers to the use of very cold substances to treat a variety of skin conditions such as warts, pre-skin cancers, molluscum contagiosum, sun spots, and several benign growths. The substance we use in cryosurgery is liquid nitrogen and is so cold (-195 degrees Celsius) that is burns when administered.     Following treatment in the office, the skin may immediately burn and become red. You may find the area around the lesion is affected as well. It is sometimes necessary to treat not only the lesion, but a small area of the surrounding normal skin to achieve a good response.     A blister, and even a blood filled blister, may form after treatment.   This is a normal response. If the blister is painful, it is acceptable to sterilize a needle and with rubbing alcohol and gently pop the blister. It is important that you gently wash the area with soap and warm water as the blister fluid may contain wart virus if a wart was treated. Do no remove the roof of the blister.     The area treated can take anywhere from 1-3 weeks to heal. Healing time depends on the kind skin lesion treated, the location, and how aggressively the lesion was treated. It is recommended that the areas treated are covered with Vaseline or bacitracin ointment and a band-aid. If a band-aid is not practical, just ointment applied several times per day will do. Keeping these areas moist will speed the healing time.    Treatment with liquid  "nitrogen can leave a scar. In dark skin, it may be a light or dark scar, in light skin it may be a white or pink scar. These will generally fade with time, but may never go away completely.     If you have any concerns after your treatment, please feel free to call the office.       [address] [place] [address]/ [phone] FAX/ [email or web address]     Shave Biopsy Wound Care    Your doctor has performed a shave biopsy today.  A band aid and vaseline ointment has been placed over the site.  This should remain in place for 24 hours.  It is recommended that you keep the area dry for the first 24 hours.  After 24 hours, you may remove the band aid and wash the area with warm soap and water and apply Vaseline jelly.  Many patients prefer to use Neosporin or Bacitracin ointment.  This is acceptable; however, know that you can develop an allergy to this medication even if you have used it safely for years.  It is important to keep the area moist.  Letting it dry out and get air slows healing time, and will worsen the scar.  Band aid is optional after first 24 hours.      If you notice increasing redness, tenderness, pain, or yellow drainage at the biopsy site, please notify your doctor.  These are signs of an infection.    If your biopsy site is bleeding, apply firm pressure for 15 minutes straight.  Repeat for another 15 minutes, if it is still bleeding.   If the surgical site continues to bleed, then please contact your doctor.      For [facility] users:   You will receive a [facility] notification after the pathologist has finished reviewing your biopsy specimen. Pathology results, however, will not be released online so you will see a "no content" message. Once your dermatologist reviews and clinically correlates your biopsy results, you will either receive a letter in the mail with the results of a phone call from your doctor's office if further explanation or treatment is warranted. "       [address] [place] [address]/ [phone] FAX/ [email or web address]

## 2020-10-15 ENCOUNTER — OFFICE VISIT (OUTPATIENT)
Dept: FAMILY MEDICINE | Facility: CLINIC | Age: 70
End: 2020-10-15
Payer: MEDICARE

## 2020-10-15 VITALS
DIASTOLIC BLOOD PRESSURE: 62 MMHG | HEART RATE: 65 BPM | BODY MASS INDEX: 25.51 KG/M2 | HEIGHT: 61 IN | OXYGEN SATURATION: 98 % | SYSTOLIC BLOOD PRESSURE: 120 MMHG | WEIGHT: 135.13 LBS | TEMPERATURE: 98 F

## 2020-10-15 DIAGNOSIS — Z78.0 OSTEOPENIA AFTER MENOPAUSE: ICD-10-CM

## 2020-10-15 DIAGNOSIS — E55.9 VITAMIN D DEFICIENCY DISEASE: ICD-10-CM

## 2020-10-15 DIAGNOSIS — Z00.00 ROUTINE MEDICAL EXAM: Primary | ICD-10-CM

## 2020-10-15 DIAGNOSIS — M85.80 OSTEOPENIA, UNSPECIFIED LOCATION: ICD-10-CM

## 2020-10-15 DIAGNOSIS — I10 ESSENTIAL HYPERTENSION: ICD-10-CM

## 2020-10-15 DIAGNOSIS — I83.93 VARICOSE VEINS OF BOTH LOWER EXTREMITIES, UNSPECIFIED WHETHER COMPLICATED: ICD-10-CM

## 2020-10-15 DIAGNOSIS — Z86.010 HISTORY OF COLONIC POLYPS: ICD-10-CM

## 2020-10-15 DIAGNOSIS — K58.1 IRRITABLE BOWEL SYNDROME WITH CONSTIPATION: ICD-10-CM

## 2020-10-15 DIAGNOSIS — E03.9 HYPOTHYROIDISM, UNSPECIFIED TYPE: ICD-10-CM

## 2020-10-15 DIAGNOSIS — Z12.31 ENCOUNTER FOR SCREENING MAMMOGRAM FOR BREAST CANCER: ICD-10-CM

## 2020-10-15 DIAGNOSIS — M85.80 OSTEOPENIA AFTER MENOPAUSE: ICD-10-CM

## 2020-10-15 DIAGNOSIS — D64.9 ANEMIA, UNSPECIFIED TYPE: ICD-10-CM

## 2020-10-15 PROCEDURE — 1101F PT FALLS ASSESS-DOCD LE1/YR: CPT | Mod: HCNC,CPTII,S$GLB, | Performed by: INTERNAL MEDICINE

## 2020-10-15 PROCEDURE — 3008F BODY MASS INDEX DOCD: CPT | Mod: HCNC,CPTII,S$GLB, | Performed by: INTERNAL MEDICINE

## 2020-10-15 PROCEDURE — 99214 PR OFFICE/OUTPT VISIT, EST, LEVL IV, 30-39 MIN: ICD-10-PCS | Mod: 25,HCNC,S$GLB, | Performed by: INTERNAL MEDICINE

## 2020-10-15 PROCEDURE — 3008F PR BODY MASS INDEX (BMI) DOCUMENTED: ICD-10-PCS | Mod: HCNC,CPTII,S$GLB, | Performed by: INTERNAL MEDICINE

## 2020-10-15 PROCEDURE — 99999 PR PBB SHADOW E&M-EST. PATIENT-LVL IV: CPT | Mod: PBBFAC,HCNC,, | Performed by: INTERNAL MEDICINE

## 2020-10-15 PROCEDURE — 1101F PR PT FALLS ASSESS DOC 0-1 FALLS W/OUT INJ PAST YR: ICD-10-PCS | Mod: HCNC,CPTII,S$GLB, | Performed by: INTERNAL MEDICINE

## 2020-10-15 PROCEDURE — 99214 OFFICE O/P EST MOD 30 MIN: CPT | Mod: 25,HCNC,S$GLB, | Performed by: INTERNAL MEDICINE

## 2020-10-15 PROCEDURE — 3078F PR MOST RECENT DIASTOLIC BLOOD PRESSURE < 80 MM HG: ICD-10-PCS | Mod: HCNC,CPTII,S$GLB, | Performed by: INTERNAL MEDICINE

## 2020-10-15 PROCEDURE — 3074F PR MOST RECENT SYSTOLIC BLOOD PRESSURE < 130 MM HG: ICD-10-PCS | Mod: HCNC,CPTII,S$GLB, | Performed by: INTERNAL MEDICINE

## 2020-10-15 PROCEDURE — 3078F DIAST BP <80 MM HG: CPT | Mod: HCNC,CPTII,S$GLB, | Performed by: INTERNAL MEDICINE

## 2020-10-15 PROCEDURE — 3074F SYST BP LT 130 MM HG: CPT | Mod: HCNC,CPTII,S$GLB, | Performed by: INTERNAL MEDICINE

## 2020-10-15 PROCEDURE — 99397 PR PREVENTIVE VISIT,EST,65 & OVER: ICD-10-PCS | Mod: HCNC,S$GLB,, | Performed by: INTERNAL MEDICINE

## 2020-10-15 PROCEDURE — 1159F PR MEDICATION LIST DOCUMENTED IN MEDICAL RECORD: ICD-10-PCS | Mod: HCNC,S$GLB,, | Performed by: INTERNAL MEDICINE

## 2020-10-15 PROCEDURE — 99397 PER PM REEVAL EST PAT 65+ YR: CPT | Mod: HCNC,S$GLB,, | Performed by: INTERNAL MEDICINE

## 2020-10-15 PROCEDURE — 1159F MED LIST DOCD IN RCRD: CPT | Mod: HCNC,S$GLB,, | Performed by: INTERNAL MEDICINE

## 2020-10-15 PROCEDURE — 99999 PR PBB SHADOW E&M-EST. PATIENT-LVL IV: ICD-10-PCS | Mod: PBBFAC,HCNC,, | Performed by: INTERNAL MEDICINE

## 2020-10-15 NOTE — PROGRESS NOTES
Chief complaint, physical exam, patient did arrive over 20 minutes after her stated appointment time  and 7 minutes late later    70-year-old white female who admittedly does not like going to the doctor. Last seen 2018.  She is here for her physical.  Regarding health maintenance it's been about 1 year since she had lab work.   mammogram.  She is overdue on her colonoscopy -due 2019.  She would like to get it done at the [facility].  Due to general orthopedic issues she has been exercising less and think she is getting deconditioned.        ROS:   CONST: weight stable. EYES: no vision change. ENT: no sore throat. CV: no chest pain w/ exertion. RESP:No orthopnea PND or cough. GI: no nausea, vomiting, diarrhea. No dysphagia. : no urinary issues. MUSCULOSKELETAL: no new myalgias or arthralgias except for some issues with the left shoulder blade and left medial elbow SKIN: no new changes. NEURO: no focal deficits. PSYCH: no new issues. ENDOCRINE: no polyuria. HEME: no lymph nodes. ALLERGY: no general pruritis.     New to Dr MARIN    PAST MEDICAL HISTORY:    1. Endometriosis,   2. Hypertension -was followed by Nephrology in the past, Onset age 24          3. Osteoarthritis,Hands back and neck  4. Migraine.  5. IBS with constipation- was seen by [facility]  6. H/o palpitations- neg w/u by Cards  7. Anxiety with h/o panic attacks  8. GERD - Esophagitis/gastritis by EGD , hiatal hernia on EGD     9. HYPOthyroidism -saw Endo in the past.  10. Osteoporosis by BMD  by Dr Garcia. Osteopenia , intolerant to Fosamax orally in the past  11. Colonoscopy with one polyp 2014, 5 years  12.  Left medial epicondylitis                                                                                                                         PAST SURGICAL HISTORY:  Hysterectomy, a , surgeries for             endometriosis, and tonsillectomy and appendectomy.                                                                                                         SOCIAL HISTORY:  No tobacco use.  Drinks 2-3 glasses of wine every day or    every other day.      Vital signs as above  Gen: no distress  EYES: conjunctiva clear, non-icteric, PERRL  ENT: nose clear, nasal mucosa normal, oropharynx clear and moist, teeth good  NECK:supple, thyroid non-palpable  RESP: effort is good, lungs clear  CV: heart RRR w/o murmur, gallops or rubs; no carotid bruits, no edema  GI: abdomen soft, non-distended, non-tender, no hepatosplenomegaly  MS: gait normal, no clubbing or cyanosis of the digits,  Skin no rashes, warm to touch    Aleksandra was seen today for annual exam.    Diagnoses and all orders for this visit:    Routine medical exam, overdue for colonoscopy, up-to-date on mammogram, discussed vaccines at length, she is hesitant to take the flu shot which we discussed at length.  She would like to wait until next week.  We discussed shingles vaccine and so forth    Encounter for screening mammogram for breast cancer  -  up-to-date                                        Additional evaluation and management issues:    Additionally, patient has numerous other medical issues and complaints to address today separate from her physical.  Patient never did take the Boniva.  She did see local Endocrine Pred encouraged her to take it again looking for GI side effects that she had on daily Fosamax.  Hold off on getting than density.  If indeed she tries parental therapy am sure will get a baseline prior to treatment.    She has some varicose veins enlargement in the left leg.  We discussed a referral to vascular and she would be interested in that.  She needs to update all her labs which we reviewed.  We had a long conversation regarding all these issues.     All these various issues reviewed and patient counseled and her evaluation and management today will be based upon time counseling.  Total time over 25 minutes with over 50%  "counseling.          Assessment and plan:        History of colonic polyps, overdue and discussed how she is not a candidate for the stool testing that she was sent    Hypothyroidism, unspecified type, reassess    Vitamin D deficiency disease, reassess    Essential hypertension, chronic and stable    Anemia, unspecified type, reassess    Irritable bowel syndrome with constipation, clinically stable    Osteopenia, unspecified location, retry Boniva and ultimately follow-up with local Endocrine    Osteopenia after menopause             Based on patient's anxiety expressed referable to all the above issues as well as to avoid confusion regarding evaluation and management coverage issues, access would not be therapeutic"This note will not be shared with the patient."    "

## 2020-10-19 LAB
FINAL PATHOLOGIC DIAGNOSIS: NORMAL
GROSS: NORMAL

## 2020-10-20 ENCOUNTER — LAB VISIT (OUTPATIENT)
Dept: LAB | Facility: HOSPITAL | Age: 70
End: 2020-10-20
Attending: INTERNAL MEDICINE
Payer: MEDICARE

## 2020-10-20 DIAGNOSIS — E03.9 HYPOTHYROIDISM, UNSPECIFIED TYPE: ICD-10-CM

## 2020-10-20 DIAGNOSIS — D64.9 ANEMIA, UNSPECIFIED TYPE: ICD-10-CM

## 2020-10-20 DIAGNOSIS — I10 ESSENTIAL HYPERTENSION: ICD-10-CM

## 2020-10-20 DIAGNOSIS — Z00.00 ROUTINE MEDICAL EXAM: ICD-10-CM

## 2020-10-20 DIAGNOSIS — E55.9 VITAMIN D DEFICIENCY DISEASE: ICD-10-CM

## 2020-10-20 LAB
25(OH)D3+25(OH)D2 SERPL-MCNC: 38 NG/ML (ref 30–96)
ALBUMIN SERPL BCP-MCNC: 3.7 G/DL (ref 3.5–5.2)
ALP SERPL-CCNC: 36 U/L (ref 55–135)
ALT SERPL W/O P-5'-P-CCNC: 18 U/L (ref 10–44)
ANION GAP SERPL CALC-SCNC: 6 MMOL/L (ref 8–16)
AST SERPL-CCNC: 19 U/L (ref 10–40)
BASOPHILS # BLD AUTO: 0.08 K/UL (ref 0–0.2)
BASOPHILS NFR BLD: 1.2 % (ref 0–1.9)
BILIRUB SERPL-MCNC: 0.5 MG/DL (ref 0.1–1)
BUN SERPL-MCNC: 23 MG/DL (ref 8–23)
CALCIUM SERPL-MCNC: 9.4 MG/DL (ref 8.7–10.5)
CHLORIDE SERPL-SCNC: 101 MMOL/L (ref 95–110)
CHOLEST SERPL-MCNC: 239 MG/DL (ref 120–199)
CHOLEST/HDLC SERPL: 1.9 {RATIO} (ref 2–5)
CO2 SERPL-SCNC: 33 MMOL/L (ref 23–29)
CREAT SERPL-MCNC: 0.8 MG/DL (ref 0.5–1.4)
DIFFERENTIAL METHOD: ABNORMAL
EOSINOPHIL # BLD AUTO: 0.2 K/UL (ref 0–0.5)
EOSINOPHIL NFR BLD: 2.2 % (ref 0–8)
ERYTHROCYTE [DISTWIDTH] IN BLOOD BY AUTOMATED COUNT: 12.8 % (ref 11.5–14.5)
EST. GFR  (AFRICAN AMERICAN): >60 ML/MIN/1.73 M^2
EST. GFR  (NON AFRICAN AMERICAN): >60 ML/MIN/1.73 M^2
GLUCOSE SERPL-MCNC: 78 MG/DL (ref 70–110)
HCT VFR BLD AUTO: 38.5 % (ref 37–48.5)
HDLC SERPL-MCNC: 126 MG/DL (ref 40–75)
HDLC SERPL: 52.7 % (ref 20–50)
HGB BLD-MCNC: 12 G/DL (ref 12–16)
IMM GRANULOCYTES # BLD AUTO: 0.01 K/UL (ref 0–0.04)
IMM GRANULOCYTES NFR BLD AUTO: 0.1 % (ref 0–0.5)
LDLC SERPL CALC-MCNC: 102 MG/DL (ref 63–159)
LYMPHOCYTES # BLD AUTO: 2.4 K/UL (ref 1–4.8)
LYMPHOCYTES NFR BLD: 34.4 % (ref 18–48)
MCH RBC QN AUTO: 31.6 PG (ref 27–31)
MCHC RBC AUTO-ENTMCNC: 31.2 G/DL (ref 32–36)
MCV RBC AUTO: 101 FL (ref 82–98)
MONOCYTES # BLD AUTO: 0.6 K/UL (ref 0.3–1)
MONOCYTES NFR BLD: 9.2 % (ref 4–15)
NEUTROPHILS # BLD AUTO: 3.7 K/UL (ref 1.8–7.7)
NEUTROPHILS NFR BLD: 52.9 % (ref 38–73)
NONHDLC SERPL-MCNC: 113 MG/DL
NRBC BLD-RTO: 0 /100 WBC
PLATELET # BLD AUTO: 271 K/UL (ref 150–350)
PMV BLD AUTO: 10.4 FL (ref 9.2–12.9)
POTASSIUM SERPL-SCNC: 4 MMOL/L (ref 3.5–5.1)
PROT SERPL-MCNC: 7 G/DL (ref 6–8.4)
RBC # BLD AUTO: 3.8 M/UL (ref 4–5.4)
SODIUM SERPL-SCNC: 140 MMOL/L (ref 136–145)
T4 FREE SERPL-MCNC: 1.35 NG/DL (ref 0.71–1.51)
TRIGL SERPL-MCNC: 55 MG/DL (ref 30–150)
TSH SERPL DL<=0.005 MIU/L-ACNC: 2.23 UIU/ML (ref 0.4–4)
WBC # BLD AUTO: 6.92 K/UL (ref 3.9–12.7)

## 2020-10-20 PROCEDURE — 80061 LIPID PANEL: CPT | Mod: HCNC

## 2020-10-20 PROCEDURE — 84443 ASSAY THYROID STIM HORMONE: CPT | Mod: HCNC

## 2020-10-20 PROCEDURE — 84439 ASSAY OF FREE THYROXINE: CPT | Mod: HCNC

## 2020-10-20 PROCEDURE — 36415 COLL VENOUS BLD VENIPUNCTURE: CPT | Mod: HCNC,PO

## 2020-10-20 PROCEDURE — 85025 COMPLETE CBC W/AUTO DIFF WBC: CPT | Mod: HCNC

## 2020-10-20 PROCEDURE — 82306 VITAMIN D 25 HYDROXY: CPT | Mod: HCNC

## 2020-10-20 PROCEDURE — 80053 COMPREHEN METABOLIC PANEL: CPT | Mod: HCNC

## 2020-11-12 ENCOUNTER — PATIENT MESSAGE (OUTPATIENT)
Dept: VASCULAR SURGERY | Facility: CLINIC | Age: 70
End: 2020-11-12

## 2020-11-12 DIAGNOSIS — I87.2 VENOUS INSUFFICIENCY: Primary | ICD-10-CM

## 2020-11-13 ENCOUNTER — PES CALL (OUTPATIENT)
Dept: ADMINISTRATIVE | Facility: CLINIC | Age: 70
End: 2020-11-13

## 2020-11-13 RX ORDER — FLUTICASONE PROPIONATE 50 MCG
SPRAY, SUSPENSION (ML) NASAL
Qty: 48 ML | Refills: 12 | Status: SHIPPED | OUTPATIENT
Start: 2020-11-13 | End: 2022-07-13

## 2020-11-22 ENCOUNTER — OFFICE VISIT (OUTPATIENT)
Dept: URGENT CARE | Facility: CLINIC | Age: 70
End: 2020-11-22
Payer: MEDICARE

## 2020-11-22 VITALS
BODY MASS INDEX: 25.49 KG/M2 | HEART RATE: 61 BPM | WEIGHT: 135 LBS | SYSTOLIC BLOOD PRESSURE: 130 MMHG | OXYGEN SATURATION: 96 % | TEMPERATURE: 97 F | DIASTOLIC BLOOD PRESSURE: 61 MMHG | RESPIRATION RATE: 17 BRPM | HEIGHT: 61 IN

## 2020-11-22 DIAGNOSIS — J01.90 ACUTE BACTERIAL RHINOSINUSITIS: Primary | ICD-10-CM

## 2020-11-22 DIAGNOSIS — R09.81 SINUS CONGESTION: ICD-10-CM

## 2020-11-22 DIAGNOSIS — B96.89 ACUTE BACTERIAL RHINOSINUSITIS: Primary | ICD-10-CM

## 2020-11-22 LAB
CTP QC/QA: YES
SARS-COV-2 RDRP RESP QL NAA+PROBE: NEGATIVE

## 2020-11-22 PROCEDURE — 99214 OFFICE O/P EST MOD 30 MIN: CPT | Mod: S$GLB,CS,, | Performed by: PHYSICIAN ASSISTANT

## 2020-11-22 PROCEDURE — U0002: ICD-10-PCS | Mod: QW,S$GLB,, | Performed by: PHYSICIAN ASSISTANT

## 2020-11-22 PROCEDURE — 3008F BODY MASS INDEX DOCD: CPT | Mod: CPTII,S$GLB,, | Performed by: PHYSICIAN ASSISTANT

## 2020-11-22 PROCEDURE — 99214 PR OFFICE/OUTPT VISIT, EST, LEVL IV, 30-39 MIN: ICD-10-PCS | Mod: S$GLB,CS,, | Performed by: PHYSICIAN ASSISTANT

## 2020-11-22 PROCEDURE — 3008F PR BODY MASS INDEX (BMI) DOCUMENTED: ICD-10-PCS | Mod: CPTII,S$GLB,, | Performed by: PHYSICIAN ASSISTANT

## 2020-11-22 PROCEDURE — U0002 COVID-19 LAB TEST NON-CDC: HCPCS | Mod: QW,S$GLB,, | Performed by: PHYSICIAN ASSISTANT

## 2020-11-22 RX ORDER — AMOXICILLIN AND CLAVULANATE POTASSIUM 875; 125 MG/1; MG/1
1 TABLET, FILM COATED ORAL 2 TIMES DAILY
Qty: 20 TABLET | Refills: 0 | Status: SHIPPED | OUTPATIENT
Start: 2020-11-22 | End: 2020-12-02

## 2020-11-22 NOTE — PATIENT INSTRUCTIONS
General Discharge Instructions   If you were prescribed a narcotic or controlled medication, do not drive or operate heavy equipment or machinery while taking these medications.  If you were prescribed antibiotics, please take them to completion.  You must understand that you've received an Urgent Care treatment only and that you may be released before all your medical problems are known or treated. You, the patient, will arrange for follow up care as instructed.  Follow up with your PCP or specialty clinic as directed in the next 1-2 weeks if not improved or as needed.  You can call [phone] to schedule an appointment with the appropriate provider.  If your condition worsens we recommend that you receive another evaluation at the emergency room immediately or contact your primary medical clinics after hours call service to discuss your concerns.  Please return here or go to the Emergency Department for any concerns or worsening of condition.      Acute Bacterial Rhinosinusitis (ABRS)    Acute bacterial rhinosinusitis (ABRS) is an infection of your nasal cavity and sinuses. Its caused by bacteria. Acute means that youve had symptoms for less than 12 weeks.  Understanding your sinuses  The nasal cavity is the large air-filled space behind your nose. The sinuses are a group of spaces formed by the bones of your face. They connect with your nasal cavity. ABRS causes the tissue lining these spaces to become inflamed. Mucus may not drain normally. This leads to facial pain and other symptoms.  What causes ABRS?  ABRS most often follows an upper respiratory infection caused by a virus. Bacteria then infect the lining of your nasal cavity and sinuses. But you can also get ABRS if you have:  · Nasal allergies  · Long-term nasal swelling and congestion not caused by allergies  · Blockage in the nose  Symptoms of ABRS  The symptoms of ABRS may be different for each person, and can include:  · Nasal congestion  · Runny  nose  · Fluid draining from the nose down the throat (postnasal drip)  · Headache  · Cough  · Pain in the sinuses  · Thick, colored fluid from the nose (mucus)  · Fever  Diagnosing ABRS  ABRS may be diagnosed if youve had an upper respiratory infection like a cold and cough for longer than 10 to 14 days. Your health care provider will ask about your symptoms and your medical history. The provider will check your vital signs, including your temperature. Youll have a physical exam. The health care provider will check your ears, nose, and throat. You likely wont need any tests. If ABRS comes back, you may have a culture or other tests.  Treatment for ABRS  Treatment may include:  · Antibiotic medicine. This is for symptoms that last for at least 10 to 14 days.  · Nasal corticosteroid medicine. Drops or spray used in the nose can lessen swelling and congestion.  · Over-the-counter pain medicine. This is to lessen sinus pain and pressure.  · Nasal decongestant medicine. Spray or drops may help to lessen congestion. Do not use them for more than a few days.  · Salt wash (saline irrigation). This can help to loosen mucus.  Possible complications of ABRS  ABRS may come back or become long-term (chronic).  In rare cases, ABRS may cause complications such as:   · Inflamed tissue around the brain and spinal cord (meningitis)  · Inflamed tissue around the eyes (orbital cellulitis)  · Inflamed bones around the sinuses (osteitis)  These problems may need to be treated in a hospital with intravenous (IV) antibiotic medicine or surgery.  When to call the health care provider  Call your health care provider if you have any of the following:  · Symptoms that dont get better, or get worse  · Symptoms that dont get better after 3 to 5 days on antibiotics  · Trouble seeing  · Swelling around your eyes  · Confusion or trouble staying awake   Date Last Reviewed: 3/3/2015  © 8935-6334 [employer]. [address] [place] [address],  ELIZABETH Castro [address]. All rights reserved. This information is not intended as a substitute for professional medical care. Always follow your healthcare professional's instructions.

## 2020-11-22 NOTE — PROGRESS NOTES
"Subjective:       Patient ID: Aleksandra Santana is a 70 y.o. female.    Vitals:  height is 5' 1" (1.549 m) and weight is 61.2 kg (135 lb). Her temperature is 96.7 °F (35.9 °C). Her blood pressure is 130/61 and her pulse is 61. Her respiration is 17 and oxygen saturation is 96%.     Chief Complaint: Sinus Problem    Patient presenting with sinus congestion/pressure and PND x 2 weeks. Denies any fever, HA, cough, SOB, N/V/D, exposure to recent sick contacts.    Sinus Problem  This is a new problem. The current episode started 1 to 4 weeks ago. The problem is unchanged. There has been no fever. The pain is moderate. Associated symptoms include congestion and sinus pressure. Pertinent negatives include no chills, coughing, diaphoresis, headaches, shortness of breath or sore throat. Past treatments include oral decongestants and acetaminophen. The treatment provided mild relief.       Constitution: Negative for chills, sweating, fatigue and fever.   HENT: Positive for congestion, postnasal drip, sinus pain and sinus pressure. Negative for sore throat.    Neck: Negative for painful lymph nodes.   Cardiovascular: Negative for chest pain and leg swelling.   Eyes: Negative for double vision and blurred vision.   Respiratory: Negative for cough, sputum production and shortness of breath.    Gastrointestinal: Negative for nausea, vomiting and diarrhea.   Genitourinary: Negative for dysuria, frequency, urgency and history of kidney stones.   Musculoskeletal: Positive for muscle ache. Negative for joint pain, joint swelling and muscle cramps.   Skin: Negative for color change, pale, rash and bruising.   Allergic/Immunologic: Negative for seasonal allergies.   Neurological: Negative for dizziness, history of vertigo, light-headedness, passing out and headaches.   Hematologic/Lymphatic: Negative for swollen lymph nodes.   Psychiatric/Behavioral: Negative for nervous/anxious, sleep disturbance and depression. The patient is not " nervous/anxious.        Objective:      Physical Exam   Constitutional: She is oriented to person, place, and time. She appears well-developed. She is cooperative.  Non-toxic appearance. She does not appear ill. No distress.   HENT:   Head: Normocephalic and atraumatic.   Ears:   Right Ear: Hearing, tympanic membrane, external ear and ear canal normal.   Left Ear: Hearing, tympanic membrane, external ear and ear canal normal.   Nose: Mucosal edema and purulent discharge present. No rhinorrhea or nasal deformity. No epistaxis. Right sinus exhibits maxillary sinus tenderness and frontal sinus tenderness. Left sinus exhibits maxillary sinus tenderness and frontal sinus tenderness.   Mouth/Throat: Uvula is midline, oropharynx is clear and moist and mucous membranes are normal. No trismus in the jaw. Normal dentition. No uvula swelling. No oropharyngeal exudate, posterior oropharyngeal edema or posterior oropharyngeal erythema.   Eyes: Conjunctivae and lids are normal. No scleral icterus.   Neck: Trachea normal, full passive range of motion without pain and phonation normal. Neck supple. No neck rigidity. No edema and no erythema present.   Cardiovascular: Normal rate, regular rhythm, normal heart sounds and normal pulses.   Pulmonary/Chest: Effort normal and breath sounds normal. No respiratory distress. She has no decreased breath sounds. She has no wheezes. She has no rhonchi.   Abdominal: Normal appearance.   Musculoskeletal: Normal range of motion.         General: No deformity.   Neurological: She is alert and oriented to person, place, and time. She exhibits normal muscle tone. Coordination normal.   Skin: Skin is warm, dry, intact, not diaphoretic and not pale. Psychiatric: Her speech is normal and behavior is normal. Judgment and thought content normal.   Nursing note and vitals reviewed.        Recent Results (from the past 48 hour(s))   POCT COVID-19 Rapid Screening    Collection Time: 11/22/20  2:59 PM    Result Value Ref Range    POC Rapid COVID Negative Negative     Acceptable Yes    ]    Assessment:       1. Acute bacterial rhinosinusitis    2. Sinus congestion        Plan:         Acute bacterial rhinosinusitis  -     amoxicillin-clavulanate 875-125mg (AUGMENTIN) 875-125 mg per tablet; Take 1 tablet by mouth 2 (two) times daily. for 10 days  Dispense: 20 tablet; Refill: 0    Sinus congestion  -     POCT COVID-19 Rapid Screening         Patient Instructions     General Discharge Instructions   If you were prescribed a narcotic or controlled medication, do not drive or operate heavy equipment or machinery while taking these medications.  If you were prescribed antibiotics, please take them to completion.  You must understand that you've received an Urgent Care treatment only and that you may be released before all your medical problems are known or treated. You, the patient, will arrange for follow up care as instructed.  Follow up with your PCP or specialty clinic as directed in the next 1-2 weeks if not improved or as needed.  You can call [phone] to schedule an appointment with the appropriate provider.  If your condition worsens we recommend that you receive another evaluation at the emergency room immediately or contact your primary medical clinics after hours call service to discuss your concerns.  Please return here or go to the Emergency Department for any concerns or worsening of condition.      Acute Bacterial Rhinosinusitis (ABRS)    Acute bacterial rhinosinusitis (ABRS) is an infection of your nasal cavity and sinuses. Its caused by bacteria. Acute means that youve had symptoms for less than 12 weeks.  Understanding your sinuses  The nasal cavity is the large air-filled space behind your nose. The sinuses are a group of spaces formed by the bones of your face. They connect with your nasal cavity. ABRS causes the tissue lining these spaces to become inflamed. Mucus may not drain  normally. This leads to facial pain and other symptoms.  What causes ABRS?  ABRS most often follows an upper respiratory infection caused by a virus. Bacteria then infect the lining of your nasal cavity and sinuses. But you can also get ABRS if you have:  · Nasal allergies  · Long-term nasal swelling and congestion not caused by allergies  · Blockage in the nose  Symptoms of ABRS  The symptoms of ABRS may be different for each person, and can include:  · Nasal congestion  · Runny nose  · Fluid draining from the nose down the throat (postnasal drip)  · Headache  · Cough  · Pain in the sinuses  · Thick, colored fluid from the nose (mucus)  · Fever  Diagnosing ABRS  ABRS may be diagnosed if youve had an upper respiratory infection like a cold and cough for longer than 10 to 14 days. Your health care provider will ask about your symptoms and your medical history. The provider will check your vital signs, including your temperature. Youll have a physical exam. The health care provider will check your ears, nose, and throat. You likely wont need any tests. If ABRS comes back, you may have a culture or other tests.  Treatment for ABRS  Treatment may include:  · Antibiotic medicine. This is for symptoms that last for at least 10 to 14 days.  · Nasal corticosteroid medicine. Drops or spray used in the nose can lessen swelling and congestion.  · Over-the-counter pain medicine. This is to lessen sinus pain and pressure.  · Nasal decongestant medicine. Spray or drops may help to lessen congestion. Do not use them for more than a few days.  · Salt wash (saline irrigation). This can help to loosen mucus.  Possible complications of ABRS  ABRS may come back or become long-term (chronic).  In rare cases, ABRS may cause complications such as:   · Inflamed tissue around the brain and spinal cord (meningitis)  · Inflamed tissue around the eyes (orbital cellulitis)  · Inflamed bones around the sinuses (osteitis)  These problems may  need to be treated in a hospital with intravenous (IV) antibiotic medicine or surgery.  When to call the health care provider  Call your health care provider if you have any of the following:  · Symptoms that dont get better, or get worse  · Symptoms that dont get better after 3 to 5 days on antibiotics  · Trouble seeing  · Swelling around your eyes  · Confusion or trouble staying awake   Date Last Reviewed: 3/3/2015  © 0887-5452 [employer]. [address] [place] [address], [place] [address]. All rights reserved. This information is not intended as a substitute for professional medical care. Always follow your healthcare professional's instructions.

## 2020-11-23 ENCOUNTER — PES CALL (OUTPATIENT)
Dept: ADMINISTRATIVE | Facility: CLINIC | Age: 70
End: 2020-11-23

## 2020-12-01 RX ORDER — HYDROCHLOROTHIAZIDE 25 MG/1
TABLET ORAL
Qty: 90 TABLET | Refills: 12 | Status: SHIPPED | OUTPATIENT
Start: 2020-12-01 | End: 2021-12-10

## 2020-12-11 ENCOUNTER — PATIENT MESSAGE (OUTPATIENT)
Dept: OTHER | Facility: OTHER | Age: 70
End: 2020-12-11

## 2020-12-17 ENCOUNTER — PATIENT MESSAGE (OUTPATIENT)
Dept: FAMILY MEDICINE | Facility: CLINIC | Age: 70
End: 2020-12-17

## 2021-02-08 ENCOUNTER — TELEPHONE (OUTPATIENT)
Dept: VASCULAR SURGERY | Facility: CLINIC | Age: 71
End: 2021-02-08

## 2021-03-09 ENCOUNTER — PATIENT MESSAGE (OUTPATIENT)
Dept: ADMINISTRATIVE | Facility: CLINIC | Age: 71
End: 2021-03-09

## 2021-03-12 ENCOUNTER — OFFICE VISIT (OUTPATIENT)
Dept: URGENT CARE | Facility: CLINIC | Age: 71
End: 2021-03-12
Payer: MEDICARE

## 2021-03-12 VITALS
RESPIRATION RATE: 16 BRPM | SYSTOLIC BLOOD PRESSURE: 136 MMHG | OXYGEN SATURATION: 97 % | BODY MASS INDEX: 24.55 KG/M2 | TEMPERATURE: 98 F | HEART RATE: 83 BPM | DIASTOLIC BLOOD PRESSURE: 74 MMHG | HEIGHT: 61 IN | WEIGHT: 130 LBS

## 2021-03-12 DIAGNOSIS — B97.89 ACUTE VIRAL SINUSITIS: Primary | ICD-10-CM

## 2021-03-12 DIAGNOSIS — R52 BODY ACHES: ICD-10-CM

## 2021-03-12 DIAGNOSIS — H61.23 BILATERAL IMPACTED CERUMEN: ICD-10-CM

## 2021-03-12 DIAGNOSIS — J02.9 SORE THROAT: ICD-10-CM

## 2021-03-12 DIAGNOSIS — J06.9 VIRAL URI: ICD-10-CM

## 2021-03-12 DIAGNOSIS — J01.90 ACUTE VIRAL SINUSITIS: Primary | ICD-10-CM

## 2021-03-12 LAB
CTP QC/QA: YES
MOLECULAR STREP A: NEGATIVE
POC MOLECULAR INFLUENZA A AGN: NEGATIVE
POC MOLECULAR INFLUENZA B AGN: NEGATIVE
SARS-COV-2 RDRP RESP QL NAA+PROBE: NEGATIVE

## 2021-03-12 PROCEDURE — 87502 INFLUENZA DNA AMP PROBE: CPT | Mod: QW,S$GLB,, | Performed by: NURSE PRACTITIONER

## 2021-03-12 PROCEDURE — U0002: ICD-10-PCS | Mod: QW,S$GLB,, | Performed by: NURSE PRACTITIONER

## 2021-03-12 PROCEDURE — 3008F PR BODY MASS INDEX (BMI) DOCUMENTED: ICD-10-PCS | Mod: CPTII,S$GLB,, | Performed by: NURSE PRACTITIONER

## 2021-03-12 PROCEDURE — U0002 COVID-19 LAB TEST NON-CDC: HCPCS | Mod: QW,S$GLB,, | Performed by: NURSE PRACTITIONER

## 2021-03-12 PROCEDURE — 99214 OFFICE O/P EST MOD 30 MIN: CPT | Mod: S$GLB,,, | Performed by: NURSE PRACTITIONER

## 2021-03-12 PROCEDURE — 3008F BODY MASS INDEX DOCD: CPT | Mod: CPTII,S$GLB,, | Performed by: NURSE PRACTITIONER

## 2021-03-12 PROCEDURE — 87502 POCT INFLUENZA A/B MOLECULAR: ICD-10-PCS | Mod: QW,S$GLB,, | Performed by: NURSE PRACTITIONER

## 2021-03-12 PROCEDURE — 87651 STREP A DNA AMP PROBE: CPT | Mod: QW,S$GLB,, | Performed by: NURSE PRACTITIONER

## 2021-03-12 PROCEDURE — 99214 PR OFFICE/OUTPT VISIT, EST, LEVL IV, 30-39 MIN: ICD-10-PCS | Mod: S$GLB,,, | Performed by: NURSE PRACTITIONER

## 2021-03-12 PROCEDURE — 87651 POCT STREP A MOLECULAR: ICD-10-PCS | Mod: QW,S$GLB,, | Performed by: NURSE PRACTITIONER

## 2021-03-12 RX ORDER — AMOXICILLIN AND CLAVULANATE POTASSIUM 875; 125 MG/1; MG/1
1 TABLET, FILM COATED ORAL EVERY 12 HOURS
Qty: 14 TABLET | Refills: 0 | Status: SHIPPED | OUTPATIENT
Start: 2021-03-12 | End: 2021-03-19

## 2021-03-22 ENCOUNTER — PES CALL (OUTPATIENT)
Dept: ADMINISTRATIVE | Facility: CLINIC | Age: 71
End: 2021-03-22

## 2021-04-06 ENCOUNTER — HOSPITAL ENCOUNTER (OUTPATIENT)
Dept: RADIOLOGY | Facility: HOSPITAL | Age: 71
Discharge: HOME | End: 2021-04-06
Attending: SURGERY
Payer: MEDICARE

## 2021-04-06 DIAGNOSIS — I87.2 VENOUS INSUFFICIENCY: ICD-10-CM

## 2021-04-06 PROCEDURE — 93970 US LOWER EXTREMITY VEINS BILATERAL INSUFFICIENCY: ICD-10-PCS | Mod: 26,,, | Performed by: RADIOLOGY

## 2021-04-06 PROCEDURE — 93970 EXTREMITY STUDY: CPT | Mod: 26,,, | Performed by: RADIOLOGY

## 2021-04-06 PROCEDURE — 93970 EXTREMITY STUDY: CPT | Mod: TC

## 2021-04-08 ENCOUNTER — OFFICE VISIT (OUTPATIENT)
Dept: VASCULAR SURGERY | Facility: CLINIC | Age: 71
End: 2021-04-08
Payer: MEDICARE

## 2021-04-08 VITALS
BODY MASS INDEX: 26.14 KG/M2 | WEIGHT: 138.44 LBS | SYSTOLIC BLOOD PRESSURE: 132 MMHG | DIASTOLIC BLOOD PRESSURE: 66 MMHG | HEIGHT: 61 IN

## 2021-04-08 DIAGNOSIS — I87.2 VENOUS INSUFFICIENCY: Primary | ICD-10-CM

## 2021-04-08 DIAGNOSIS — I78.1 SPIDER VEIN, SYMPTOMATIC: ICD-10-CM

## 2021-04-08 DIAGNOSIS — I83.93 VARICOSE VEINS OF BOTH LOWER EXTREMITIES, UNSPECIFIED WHETHER COMPLICATED: ICD-10-CM

## 2021-04-08 PROCEDURE — 99999 PR PBB SHADOW E&M-EST. PATIENT-LVL V: ICD-10-PCS | Mod: PBBFAC,,, | Performed by: SURGERY

## 2021-04-08 PROCEDURE — 1126F PR PAIN SEVERITY QUANTIFIED, NO PAIN PRESENT: ICD-10-PCS | Mod: S$GLB,,, | Performed by: SURGERY

## 2021-04-08 PROCEDURE — 1159F PR MEDICATION LIST DOCUMENTED IN MEDICAL RECORD: ICD-10-PCS | Mod: S$GLB,,, | Performed by: SURGERY

## 2021-04-08 PROCEDURE — 99499 UNLISTED E&M SERVICE: CPT | Mod: S$GLB,,, | Performed by: SURGERY

## 2021-04-08 PROCEDURE — 1159F MED LIST DOCD IN RCRD: CPT | Mod: S$GLB,,, | Performed by: SURGERY

## 2021-04-08 PROCEDURE — 3288F FALL RISK ASSESSMENT DOCD: CPT | Mod: CPTII,S$GLB,, | Performed by: SURGERY

## 2021-04-08 PROCEDURE — 99203 OFFICE O/P NEW LOW 30 MIN: CPT | Mod: S$GLB,,, | Performed by: SURGERY

## 2021-04-08 PROCEDURE — 1101F PT FALLS ASSESS-DOCD LE1/YR: CPT | Mod: CPTII,S$GLB,, | Performed by: SURGERY

## 2021-04-08 PROCEDURE — 3008F BODY MASS INDEX DOCD: CPT | Mod: CPTII,S$GLB,, | Performed by: SURGERY

## 2021-04-08 PROCEDURE — 99203 PR OFFICE/OUTPT VISIT, NEW, LEVL III, 30-44 MIN: ICD-10-PCS | Mod: S$GLB,,, | Performed by: SURGERY

## 2021-04-08 PROCEDURE — 3288F PR FALLS RISK ASSESSMENT DOCUMENTED: ICD-10-PCS | Mod: CPTII,S$GLB,, | Performed by: SURGERY

## 2021-04-08 PROCEDURE — 1126F AMNT PAIN NOTED NONE PRSNT: CPT | Mod: S$GLB,,, | Performed by: SURGERY

## 2021-04-08 PROCEDURE — 3008F PR BODY MASS INDEX (BMI) DOCUMENTED: ICD-10-PCS | Mod: CPTII,S$GLB,, | Performed by: SURGERY

## 2021-04-08 PROCEDURE — 1101F PR PT FALLS ASSESS DOC 0-1 FALLS W/OUT INJ PAST YR: ICD-10-PCS | Mod: CPTII,S$GLB,, | Performed by: SURGERY

## 2021-04-08 PROCEDURE — 99499 RISK ADDL DX/OHS AUDIT: ICD-10-PCS | Mod: S$GLB,,, | Performed by: SURGERY

## 2021-04-08 PROCEDURE — 99999 PR PBB SHADOW E&M-EST. PATIENT-LVL V: CPT | Mod: PBBFAC,,, | Performed by: SURGERY

## 2021-05-04 DIAGNOSIS — I87.2 VENOUS INSUFFICIENCY: Primary | ICD-10-CM

## 2021-05-28 ENCOUNTER — TELEPHONE (OUTPATIENT)
Dept: VASCULAR SURGERY | Facility: CLINIC | Age: 71
End: 2021-05-28

## 2021-06-01 ENCOUNTER — TELEPHONE (OUTPATIENT)
Dept: VASCULAR SURGERY | Facility: CLINIC | Age: 71
End: 2021-06-01

## 2021-06-09 ENCOUNTER — TELEPHONE (OUTPATIENT)
Dept: VASCULAR SURGERY | Facility: CLINIC | Age: 71
End: 2021-06-09

## 2021-06-10 ENCOUNTER — TELEPHONE (OUTPATIENT)
Dept: VASCULAR SURGERY | Facility: CLINIC | Age: 71
End: 2021-06-10

## 2021-06-23 DIAGNOSIS — F41.9 ANXIETY DUE TO INVASIVE PROCEDURE: Primary | ICD-10-CM

## 2021-06-23 RX ORDER — ALPRAZOLAM 0.5 MG/1
TABLET ORAL
Qty: 2 TABLET | Refills: 0 | Status: SHIPPED | OUTPATIENT
Start: 2021-06-23 | End: 2023-12-06

## 2021-11-18 ENCOUNTER — TELEPHONE (OUTPATIENT)
Dept: UROLOGY | Facility: CLINIC | Age: 71
End: 2021-11-18
Payer: MEDICARE

## 2021-11-18 ENCOUNTER — DOCUMENTATION ONLY (OUTPATIENT)
Dept: UROLOGY | Facility: CLINIC | Age: 71
End: 2021-11-18
Payer: MEDICARE

## 2021-11-19 ENCOUNTER — TELEPHONE (OUTPATIENT)
Dept: UROLOGY | Facility: CLINIC | Age: 71
End: 2021-11-19
Payer: MEDICARE

## 2021-11-19 ENCOUNTER — PATIENT MESSAGE (OUTPATIENT)
Dept: UROLOGY | Facility: CLINIC | Age: 71
End: 2021-11-19
Payer: MEDICARE

## 2021-11-29 ENCOUNTER — OFFICE VISIT (OUTPATIENT)
Dept: UROLOGY | Facility: CLINIC | Age: 71
End: 2021-11-29
Payer: MEDICARE

## 2021-11-29 VITALS
HEART RATE: 83 BPM | SYSTOLIC BLOOD PRESSURE: 130 MMHG | WEIGHT: 131.63 LBS | HEIGHT: 61 IN | BODY MASS INDEX: 24.85 KG/M2 | DIASTOLIC BLOOD PRESSURE: 70 MMHG

## 2021-11-29 DIAGNOSIS — N32.81 OVERACTIVE BLADDER: Primary | ICD-10-CM

## 2021-11-29 PROCEDURE — 99999 PR PBB SHADOW E&M-EST. PATIENT-LVL II: ICD-10-PCS | Mod: PBBFAC,,, | Performed by: UROLOGY

## 2021-11-29 PROCEDURE — 99999 PR PBB SHADOW E&M-EST. PATIENT-LVL II: CPT | Mod: PBBFAC,,, | Performed by: UROLOGY

## 2021-11-29 PROCEDURE — 99204 OFFICE O/P NEW MOD 45 MIN: CPT | Mod: S$GLB,,, | Performed by: UROLOGY

## 2021-11-29 PROCEDURE — 99204 PR OFFICE/OUTPT VISIT, NEW, LEVL IV, 45-59 MIN: ICD-10-PCS | Mod: S$GLB,,, | Performed by: UROLOGY

## 2021-11-29 RX ORDER — AMITRIPTYLINE HYDROCHLORIDE 25 MG/1
25 TABLET, FILM COATED ORAL NIGHTLY
Qty: 30 TABLET | Refills: 2 | Status: SHIPPED | OUTPATIENT
Start: 2021-11-29 | End: 2023-12-06

## 2021-11-29 NOTE — PROGRESS NOTES
[facility]      New Overactive Bladder (OAB) Note    2021    Referred by:  Self, Aaareferral    HPI: Aleksandra Santana is a very pleasant 71 y.o. female who is a new patient to our department referred for evaluation of urinary incontinence of 4-6 years duration. She reports bother is associated with urinary daytime frequency (4-5x daily), without nocturia (0x per night) and with urgency that results in urinary incontinence daily. She reports stress incontinence which is about equal to her urgency incontinence. She does not require daily pad use.  She has decreased overall fluid intake.  She reports urinary incontinence is only during the day.     She reports symptoms of irritative voiding including frequency, incontinence and urgency. She reports symptoms of obstructive voiding including intermittency and post void dribbling. Bladder scan PVR was 0 mL. Her history includes prior pelvic surgery ( , endometriosis ablation). She denies all other prior pelvic surgeries or neurologic diagnoses. She does not report symptoms suggestive of advanced POP. She reports a history of constipation, IBS, takes a daily laxative. She denies a history suggestive of glaucoma.  She reports a history of hypertension that is controlled with medications. Would plan to recheck any response of blood pressure if beginning Myrbetriq.     Previous history of endometriosis. 2 C-sections 40 years ago.    Previous incontinence therapies:   Behavioral Therapy: (fluid/dietary modification) -  provided some but insufficient benefit    A review of 10+ systems was conducted with pertinent positive and negative findings documented in HPI with all other systems reviewed and negative.    Past medical, family, surgical and social history reviewed as documented in chart with pertinent positive medical, family, surgical and social history detailed in HPI.    Exam Findings:    Gen: no acute distress, conversant  Eyes:  anicteric, extraocular muscles intact  Lungs: normal inspiration, no retractions  GI: soft, non-tender, no distension  Psych: appropriate affect, alert and oriented     Assessment/Plan:    Overactive Bladder with Mixed Urinary Incontinence (new, addt'l workup): Her history is suggestive of Overactive Bladder (OAB) with predominantly Urgency Urinary Incontinence (UUI). The presence or absence of incontinence as well as the relative contribution of stress or urgency incontinence can be further clarified with a 3-day volume-based voiding/incontinence diary provided today. This will also help to screen for polyuria and help to guide us on further counseling on behavioral therapy including timed voiding and bladder training. We will review this on her return visit.     Her reported symptoms today are relatively moderate and therefore, I believe it would be appropriate to begin pharmacologic therapy in addition to behavioral therapies.     1. We discussed behavioral therapies including fluid/dietary modification, timed voiding with bladder training, and pelvic floor exercises.  2. Referral made for pelvic floor physical therapy to guide patient in pelvic floor exercises.  3. We will begin an appropriate OAB medication based medication history, comorbid conditions and formulary coverage.   4. Return to clinic in 1 month to discuss progress, adjusting behavioral therapy and/or treatment options at that time.  5. Amitriptyline 25 mg qhs sent to home pharmacy.           Review of prior external note(s): outside records reviewed today and summarized in HPI   Review of test results: urinalysis (11/29/2021)   Tests Ordered: none   Radiology independently reviewed: none   Discussion with external physician/health care professional: No discussion with external provider

## 2021-12-01 ENCOUNTER — TELEPHONE (OUTPATIENT)
Dept: UROLOGY | Facility: CLINIC | Age: 71
End: 2021-12-01
Payer: MEDICARE

## 2021-12-17 RX ORDER — OLMESARTAN MEDOXOMIL 40 MG/1
TABLET ORAL
Qty: 90 TABLET | Refills: 1 | Status: SHIPPED | OUTPATIENT
Start: 2021-12-17 | End: 2022-07-08

## 2021-12-23 ENCOUNTER — PATIENT MESSAGE (OUTPATIENT)
Dept: UROLOGY | Facility: CLINIC | Age: 71
End: 2021-12-23
Payer: MEDICARE

## 2021-12-30 ENCOUNTER — CLINICAL SUPPORT (OUTPATIENT)
Dept: REHABILITATION | Facility: HOSPITAL | Age: 71
End: 2021-12-30
Payer: MEDICARE

## 2021-12-30 DIAGNOSIS — N32.81 OVERACTIVE BLADDER: ICD-10-CM

## 2021-12-30 DIAGNOSIS — M62.81 WEAKNESS OF TRUNK MUSCULATURE: Primary | ICD-10-CM

## 2021-12-30 DIAGNOSIS — M62.89 PELVIC FLOOR DYSFUNCTION: ICD-10-CM

## 2021-12-30 PROCEDURE — 97530 THERAPEUTIC ACTIVITIES: CPT | Mod: HCNC,PN

## 2021-12-30 PROCEDURE — 97162 PT EVAL MOD COMPLEX 30 MIN: CPT | Mod: HCNC,PN

## 2021-12-30 PROCEDURE — 97110 THERAPEUTIC EXERCISES: CPT | Mod: HCNC,PN

## 2022-01-12 ENCOUNTER — PATIENT MESSAGE (OUTPATIENT)
Dept: UROLOGY | Facility: CLINIC | Age: 72
End: 2022-01-12
Payer: MEDICARE

## 2022-01-12 ENCOUNTER — OFFICE VISIT (OUTPATIENT)
Dept: URGENT CARE | Facility: CLINIC | Age: 72
End: 2022-01-12
Payer: MEDICARE

## 2022-01-12 VITALS
SYSTOLIC BLOOD PRESSURE: 124 MMHG | OXYGEN SATURATION: 98 % | HEART RATE: 68 BPM | WEIGHT: 130 LBS | TEMPERATURE: 98 F | DIASTOLIC BLOOD PRESSURE: 58 MMHG | BODY MASS INDEX: 24.55 KG/M2 | HEIGHT: 61 IN

## 2022-01-12 DIAGNOSIS — U07.1 COVID-19 VIRUS DETECTED: ICD-10-CM

## 2022-01-12 DIAGNOSIS — R05.9 COUGH: Primary | ICD-10-CM

## 2022-01-12 DIAGNOSIS — U07.1 COVID-19: ICD-10-CM

## 2022-01-12 LAB
CTP QC/QA: YES
SARS-COV-2 RDRP RESP QL NAA+PROBE: POSITIVE

## 2022-01-12 PROCEDURE — 3078F PR MOST RECENT DIASTOLIC BLOOD PRESSURE < 80 MM HG: ICD-10-PCS | Mod: CPTII,S$GLB,,

## 2022-01-12 PROCEDURE — 1160F RVW MEDS BY RX/DR IN RCRD: CPT | Mod: CPTII,S$GLB,,

## 2022-01-12 PROCEDURE — 3078F DIAST BP <80 MM HG: CPT | Mod: CPTII,S$GLB,,

## 2022-01-12 PROCEDURE — U0002 COVID-19 LAB TEST NON-CDC: HCPCS | Mod: QW,CR,S$GLB,

## 2022-01-12 PROCEDURE — U0002: ICD-10-PCS | Mod: QW,CR,S$GLB,

## 2022-01-12 PROCEDURE — 1159F MED LIST DOCD IN RCRD: CPT | Mod: CPTII,S$GLB,,

## 2022-01-12 PROCEDURE — 3074F SYST BP LT 130 MM HG: CPT | Mod: CPTII,S$GLB,,

## 2022-01-12 PROCEDURE — 3008F BODY MASS INDEX DOCD: CPT | Mod: CPTII,S$GLB,,

## 2022-01-12 PROCEDURE — 1160F PR REVIEW ALL MEDS BY PRESCRIBER/CLIN PHARMACIST DOCUMENTED: ICD-10-PCS | Mod: CPTII,S$GLB,,

## 2022-01-12 PROCEDURE — 1159F PR MEDICATION LIST DOCUMENTED IN MEDICAL RECORD: ICD-10-PCS | Mod: CPTII,S$GLB,,

## 2022-01-12 PROCEDURE — 99213 PR OFFICE/OUTPT VISIT, EST, LEVL III, 20-29 MIN: ICD-10-PCS | Mod: S$GLB,,,

## 2022-01-12 PROCEDURE — 99213 OFFICE O/P EST LOW 20 MIN: CPT | Mod: S$GLB,,,

## 2022-01-12 PROCEDURE — 3008F PR BODY MASS INDEX (BMI) DOCUMENTED: ICD-10-PCS | Mod: CPTII,S$GLB,,

## 2022-01-12 PROCEDURE — 3074F PR MOST RECENT SYSTOLIC BLOOD PRESSURE < 130 MM HG: ICD-10-PCS | Mod: CPTII,S$GLB,,

## 2022-01-12 RX ORDER — BENZONATATE 200 MG/1
200 CAPSULE ORAL 3 TIMES DAILY PRN
Qty: 30 CAPSULE | Refills: 0 | Status: SHIPPED | OUTPATIENT
Start: 2022-01-12 | End: 2022-01-22

## 2022-01-12 RX ORDER — IPRATROPIUM BROMIDE 42 UG/1
2 SPRAY, METERED NASAL 4 TIMES DAILY
Qty: 15 ML | Refills: 0 | Status: SHIPPED | OUTPATIENT
Start: 2022-01-12 | End: 2022-01-19

## 2022-01-12 RX ORDER — GUAIFENESIN 600 MG/1
1200 TABLET, EXTENDED RELEASE ORAL 2 TIMES DAILY
Qty: 28 TABLET | Refills: 0 | Status: SHIPPED | OUTPATIENT
Start: 2022-01-12 | End: 2022-01-19

## 2022-01-13 ENCOUNTER — PATIENT MESSAGE (OUTPATIENT)
Dept: FAMILY MEDICINE | Facility: CLINIC | Age: 72
End: 2022-01-13
Payer: MEDICARE

## 2022-01-13 NOTE — PROGRESS NOTES
"Subjective:       Patient ID: Aleksandra Santana is a 71 y.o. female.    Vitals:  height is 5' 1" (1.549 m) and weight is 59 kg (130 lb). Her temperature is 97.6 °F (36.4 °C). Her blood pressure is 124/58 (abnormal) and her pulse is 68. Her oxygen saturation is 98%.     Chief Complaint: URI    Patient presents for chills, cough since January 2nd. She also complains of swollen glands and sneezing. She denies chest pain.Started with neck pain, muscle aches and chills. Then she started having a stuffy nose, sneezing, headache. Now she has a cough and she is achey again. Denies fever. She is fully vaccinated against covid.         Constitution: Positive for sweating, fatigue, fever and generalized weakness.   HENT: Positive for congestion, postnasal drip and sore throat. Negative for ear pain, tinnitus, hearing loss and trouble swallowing.    Eyes: Negative for eye pain, eye redness and photophobia.   Respiratory: Positive for cough. Negative for sputum production and shortness of breath.    Gastrointestinal: Positive for nausea and diarrhea. Negative for constipation.   Musculoskeletal: Positive for pain, back pain and muscle ache.   Skin: Negative for hives.   Allergic/Immunologic: Positive for sneezing. Negative for hives and itching.   Neurological: Positive for headaches. Negative for disorientation and altered mental status.   Psychiatric/Behavioral: Negative for altered mental status, disorientation and confusion.       Objective:      Physical Exam   Constitutional: She is oriented to person, place, and time. She appears well-developed and well-nourished. She is cooperative.  Non-toxic appearance. She does not have a sickly appearance. She does not appear ill. No distress.      Comments:Patient sitting comfortably in exam chair. She answers questions in full sentences. She does not show signs of discoloration or distress.    HENT:   Head: Normocephalic and atraumatic.   Ears:   Right Ear: Hearing, tympanic membrane, " external ear and ear canal normal.   Left Ear: Hearing, tympanic membrane, external ear and ear canal normal.   Nose: Rhinorrhea and congestion present. No mucosal edema or nasal deformity. No epistaxis. Right sinus exhibits no maxillary sinus tenderness and no frontal sinus tenderness. Left sinus exhibits no maxillary sinus tenderness and no frontal sinus tenderness.   Mouth/Throat: Uvula is midline and mucous membranes are normal. No trismus in the jaw. Normal dentition. No uvula swelling. Posterior oropharyngeal erythema present. No oropharyngeal exudate or posterior oropharyngeal edema.   Eyes: Conjunctivae and lids are normal. No scleral icterus.   Neck: Trachea normal and phonation normal. Neck supple. No edema present. No erythema present. No neck rigidity present.   Cardiovascular: Normal rate, regular rhythm, normal heart sounds, intact distal pulses and normal pulses.   Pulmonary/Chest: Effort normal and breath sounds normal. No stridor. No respiratory distress. She has no decreased breath sounds. She has no wheezes. She has no rhonchi. She has no rales.   Abdominal: Normal appearance.   Musculoskeletal: Normal range of motion.         General: No deformity or edema. Normal range of motion.   Lymphadenopathy:     She has no cervical adenopathy.        Right cervical: No superficial cervical, no deep cervical and no posterior cervical adenopathy present.       Left cervical: No superficial cervical, no deep cervical and no posterior cervical adenopathy present.   Neurological: She is alert and oriented to person, place, and time. She exhibits normal muscle tone. Coordination normal.   Skin: Skin is warm, dry, intact, not diaphoretic and not pale.   Psychiatric: She has a normal mood and affect. Her speech is normal and behavior is normal. Judgment and thought content normal. Cognition and memory  Nursing note and vitals reviewed.        Results for orders placed or performed in visit on 01/12/22   POCT  COVID-19 Rapid Screening   Result Value Ref Range    POC Rapid COVID Positive (A) Negative     Acceptable Yes      3      Assessment:       1. Cough    2. COVID-19          Plan:         Cough  -     POCT COVID-19 Rapid Screening  -     benzonatate (TESSALON) 200 MG capsule; Take 1 capsule (200 mg total) by mouth 3 (three) times daily as needed for Cough.  Dispense: 30 capsule; Refill: 0  -     guaiFENesin (MUCINEX) 600 mg 12 hr tablet; Take 2 tablets (1,200 mg total) by mouth 2 (two) times daily. for 7 days  Dispense: 28 tablet; Refill: 0  -     ipratropium (ATROVENT) 42 mcg (0.06 %) nasal spray; 2 sprays by Nasal route 4 (four) times daily. for 7 days  Dispense: 15 mL; Refill: 0    COVID-19  -     Ambulatory referral/consult to [facility]                 Patient Instructions    You have tested positive for COVID-19 today.       ISOLATION  If you tested positive and do not have symptoms, you must isolate for 5 days starting on the day of the positive test. I     If you tested positive and have symptoms, you must isolate for 5 days starting on the day of the first symptoms,  not the day of the positive test.     This is the most important part, both the CDC and the [facility] emphasize that you do not test out of isolation.     After 5 days, if your symptoms have improved and you have not had fever on day 5, you can return to the community on day 6- NO TESTING REQUIRED!      In fact, we do not retest if you were positive in the last 90 days.     After your 5 days of isolation are completed, the CDC recommends strict mask use for the first 5 days that you come out of isolation.      CDC Testing and Quarantine Guidelines for patients with exposure to a known-positive COVID-19 person:  ·     A 'close exposure' is defined as anyone who has had an exposure (masked or unmasked) to a known COVID -19 positive person          within 6 feet of someone          for a cumulative total of 15 minutes or more over a  24-hour period.  ·     vaccinated Have been boosted or completed the primary series of Pfizer or Moderna vaccine within the last 6 months or completed the primary series of J&J vaccine within the last 2 months and/or had a positive test within 90 days          do NOT need to quarantine after contact with someone who had COVID-19 unless they have symptoms.          fully vaccinated people who have not had a positive test within 90 days, should get tested 3-5 days after their exposure, even if they don't have symptoms and wear a mask indoors in public for 10 days following exposure or until their test result is negative on day 5.          If you develop symptoms test and quarantine.     ·     Unvaccinated, or are more than six months out from their second mRNA dose (or more than 2 months after the J&J vaccine) and not yet boosted,  and/or NOT had a positive test within 90 days and meet 'close exposure'  you are required by CDC guidelines to quarantine for at least 5 days from time of exposure followed by 5 days of strict masking. It is recommended, but not required to test after 5 days, unless you develop symptoms, in which case you should test at that time.  If you do decide to test at 5 days and are asymptomatic, the risk is that if you test without symptoms on Day 5 for example) and you are positive, your 5 day isolation begins on that day, and you turned your 5 day quarantine into 10 days.          If your exposure does not meet the above definition, you can return to your normal daily activities to include social distancing, wearing a mask and frequent handwashing.  Alternatively, if a 5-day quarantine is not feasible, it is imperative that an exposed person wear a well-fitting mask at all times when around others for 10 days after exposure.                - Rest.    - Drink plenty of fluids.    - Acetaminophen (tylenol) or Ibuprofen (advil,motrin) as directed as needed for fever/pain. Avoid tylenol if you have a  history of liver disease. Do not take ibuprofen if you have a history of GI bleeding, kidney disease, or if you take blood thinners.     - You must understand that you have received an Urgent Care treatment only and that you may be released before all of your medical problems are known or treated.   - You, the patient, will arrange for follow up care as instructed.   - If your condition worsens or fails to improve we recommend that you receive another evaluation at the ER immediately or contact your PCP to discuss your concerns or return here.   - Follow up with your PCP or specialty clinic as directed in the next 1-2 weeks if not improved or as needed.  You can call [phone] to schedule an appointment with the appropriate provider.      If your symptoms do not improve or worsen, go to the emergency room immediately.    Patient Education       COVID-19 Discharge Instructions   About this topic   Coronavirus disease 2019 is also known as COVID-19. It is a viral illness that infects the lungs. It is caused by a virus called SARS-associated coronavirus (SARS-CoV-2).  The signs of COVID-19 most often start a few days after you have been infected. In some people, it takes longer to show signs. Others never show signs of the infection. You may have a cough, fever, shaking chills and it may be hard to breathe. You may be very tired, have muscle aches, a headache or sore throat. Some people have an upset stomach or loose stools. Others lose their sense of smell or taste. You may not have these signs all the time and they may come and go while you are sick.  The virus spreads easily through droplets when you talk, sneeze, or cough. You can pass the virus to others when you are talking close together, singing, hugging, sharing food, or shaking hands. Doctors believe the germs also survive on surfaces like tables, door handles, and telephones. However, this is not a common way that COVID-19 spreads. Doctors believe you  can also spread the infection even if you dont have any symptoms, but they do not know how that happens. This is why getting vaccinated is one of the best ways to keep you healthy and slow the spread of the virus.  Some people have a mild case of COVID-19 and are able to stay at home and away from others until they feel better. Others may need to be in the hospital if they are very sick. Some people with COVID-19 can have some symptoms for weeks or months. People with COVID-19 must isolate themselves. You can start to be around others when your doctor says it is safe to do so.       What care is needed at home?   · Ask your doctor what you need to do when you go home. Make sure you ask questions if you do not understand what the doctor says.  · Drink lots of water, juice, or broth to replace fluids lost from a fever.  · You may use cool mist humidifiers to help ease congestion and coughing.  · Use 2 to 3 pillows to prop yourself up when you lie down to make it easier to breathe and sleep.  · Do not smoke and do not drink beer, wine, and mixed drinks (alcohol).  · To lower the chance of passing the infection to others, get a COVID-19 vaccine after your infection has resolved.  · If you have not been fully vaccinated:  ? Wear a mask over your mouth and nose if you are around others who are not sick. Cloth masks work best if they have more than one layer of fabric.  ? Wash your hands often.  ? Stay home in a separate room, if possible, away from others. Only go out to get medical care.  ? Use a separate bathroom if possible.  ? Do not make food for others.  What follow-up care is needed?   · Your doctor may ask you to make visits to the office to check on your progress. Be sure to keep these visits. Make sure you wear a mask at these visits.  · If you can, tell the staff you have COVID-19 ahead of time so they can take extra care to stop the disease from spreading.  · It may take a few weeks before your health returns  to normal.  What drugs may be needed?   The doctor may order drugs to:  · Help with breathing  · Help with fever  · Help with swelling in your airways and lungs  · Control coughing  · Ease a sore throat  · Help a runny or stuffy nose  Will physical activity be limited?   You may have to limit your physical activity. Talk to your doctor about the right amount of activity for you. If you have been very sick with COVID-19, it can take some time to get your strength back.  Will there be any other care needed?   Doctors do not know how long you can pass the virus on to others after you are sick. This is why it is important to stay in a separate room, if possible, when you are sick. For now, doctors are giving general guidelines for you to follow after you have been sick. Before you go around other people, you should:  · Be fever free for 24 hours without taking any drugs to lower the fever  · Have no symptoms of cough or shortness of breath  · Wait at least 10 days after first having symptoms or your first positive test, and you need to be symptom free as above. Some experts suggest waiting 20 days if you have had a more severe infection.  Talk with your doctor about getting a COVID-19 vaccine.  What problems could happen?   · Fluid loss. This is dehydration.  · Short-term or long-term lung damage  · Heart problems  · Death  When do I need to call the doctor?   · You are having so much trouble breathing that you can only say one or two words at a time.  · You need to sit upright at all times to be able to breathe and/or cannot lie down.  · You are very confused or cannot stay awake.  · Your lips or skin start to turn blue or grey.  · You think you might be having a medical emergency. Some examples of medical emergencies are:  ? Severe chest pain.  ? Not able to speak or move normally.  · You have trouble breathing when talking or sitting still.  · You have new shortness of breath.  · You become weak or dizzy.  · You have  very dark urine or do not pass urine for more than 8 hours.  · You have new or worsening COVID-19 symptoms like:  ? Fever  ? Cough  ? Feeling very tired  ? Shaking chills  ? Headache  ? Trouble swallowing  ? Throwing up  ? Loose stools  ? Reddish purple spots on your fingers or toes  Teach Back: Helping You Understand   The Teach Back Method helps you understand the information we are giving you. After you talk with the staff, tell them in your own words what you learned. This helps to make sure the staff has described each thing clearly. It also helps to explain things that may have been confusing. Before going home, make sure you can do these:  · I can tell you about my condition.  · I can tell you what may help ease my breathing.  · I can tell you what I can do to help avoid passing the infection to others.  · I can tell you what I will do if I have trouble breathing; feel sleepy or confused; or my fingertips, fingernails, skin, or lips are blue.  Where can I learn more?   Centers for Disease Control and Prevention  https://www.cdc.gov/coronavirus/2019-ncov/about/index.html   Centers for Disease Control and Prevention  https://www.cdc.gov/coronavirus/2019-ncov/hcp/disposition-in-home-patients.html   World Health Organization  https://www.who.int/news-room/q-a-detail/p-e-jeqghzuajvyxx   Last Reviewed Date   2021-10-05  Consumer Information Use and Disclaimer   This information is not specific medical advice and does not replace information you receive from your health care provider. This is only a brief summary of general information. It does NOT include all information about conditions, illnesses, injuries, tests, procedures, treatments, therapies, discharge instructions or life-style choices that may apply to you. You must talk with your health care provider for complete information about your health and treatment options. This information should not be used to decide whether or not to accept your health care  providers advice, instructions or recommendations. Only your health care provider has the knowledge and training to provide advice that is right for you.  Copyright   Copyright © 2021 [employer] and its affiliates and/or licensors. All rights reserved.

## 2022-01-13 NOTE — PATIENT INSTRUCTIONS
You have tested positive for COVID-19 today.       ISOLATION  If you tested positive and do not have symptoms, you must isolate for 5 days starting on the day of the positive test. I     If you tested positive and have symptoms, you must isolate for 5 days starting on the day of the first symptoms,  not the day of the positive test.     This is the most important part, both the CDC and the [facility] emphasize that you do not test out of isolation.     After 5 days, if your symptoms have improved and you have not had fever on day 5, you can return to the community on day 6- NO TESTING REQUIRED!      In fact, we do not retest if you were positive in the last 90 days.     After your 5 days of isolation are completed, the CDC recommends strict mask use for the first 5 days that you come out of isolation.      CDC Testing and Quarantine Guidelines for patients with exposure to a known-positive COVID-19 person:  ·     A 'close exposure' is defined as anyone who has had an exposure (masked or unmasked) to a known COVID -19 positive person          within 6 feet of someone          for a cumulative total of 15 minutes or more over a 24-hour period.  ·     vaccinated Have been boosted or completed the primary series of Pfizer or Moderna vaccine within the last 6 months or completed the primary series of J&J vaccine within the last 2 months and/or had a positive test within 90 days          do NOT need to quarantine after contact with someone who had COVID-19 unless they have symptoms.          fully vaccinated people who have not had a positive test within 90 days, should get tested 3-5 days after their exposure, even if they don't have symptoms and wear a mask indoors in public for 10 days following exposure or until their test result is negative on day 5.          If you develop symptoms test and quarantine.     ·     Unvaccinated, or are more than six months out from their second mRNA dose (or more than 2 months after the J&J  vaccine) and not yet boosted,  and/or NOT had a positive test within 90 days and meet 'close exposure'  you are required by CDC guidelines to quarantine for at least 5 days from time of exposure followed by 5 days of strict masking. It is recommended, but not required to test after 5 days, unless you develop symptoms, in which case you should test at that time.  If you do decide to test at 5 days and are asymptomatic, the risk is that if you test without symptoms on Day 5 for example) and you are positive, your 5 day isolation begins on that day, and you turned your 5 day quarantine into 10 days.          If your exposure does not meet the above definition, you can return to your normal daily activities to include social distancing, wearing a mask and frequent handwashing.  Alternatively, if a 5-day quarantine is not feasible, it is imperative that an exposed person wear a well-fitting mask at all times when around others for 10 days after exposure.                - Rest.    - Drink plenty of fluids.    - Acetaminophen (tylenol) or Ibuprofen (advil,motrin) as directed as needed for fever/pain. Avoid tylenol if you have a history of liver disease. Do not take ibuprofen if you have a history of GI bleeding, kidney disease, or if you take blood thinners.     - You must understand that you have received an Urgent Care treatment only and that you may be released before all of your medical problems are known or treated.   - You, the patient, will arrange for follow up care as instructed.   - If your condition worsens or fails to improve we recommend that you receive another evaluation at the ER immediately or contact your PCP to discuss your concerns or return here.   - Follow up with your PCP or specialty clinic as directed in the next 1-2 weeks if not improved or as needed.  You can call [phone] to schedule an appointment with the appropriate provider.      If your symptoms do not improve or worsen, go to the  emergency room immediately.    Patient Education       COVID-19 Discharge Instructions   About this topic   Coronavirus disease 2019 is also known as COVID-19. It is a viral illness that infects the lungs. It is caused by a virus called SARS-associated coronavirus (SARS-CoV-2).  The signs of COVID-19 most often start a few days after you have been infected. In some people, it takes longer to show signs. Others never show signs of the infection. You may have a cough, fever, shaking chills and it may be hard to breathe. You may be very tired, have muscle aches, a headache or sore throat. Some people have an upset stomach or loose stools. Others lose their sense of smell or taste. You may not have these signs all the time and they may come and go while you are sick.  The virus spreads easily through droplets when you talk, sneeze, or cough. You can pass the virus to others when you are talking close together, singing, hugging, sharing food, or shaking hands. Doctors believe the germs also survive on surfaces like tables, door handles, and telephones. However, this is not a common way that COVID-19 spreads. Doctors believe you can also spread the infection even if you dont have any symptoms, but they do not know how that happens. This is why getting vaccinated is one of the best ways to keep you healthy and slow the spread of the virus.  Some people have a mild case of COVID-19 and are able to stay at home and away from others until they feel better. Others may need to be in the hospital if they are very sick. Some people with COVID-19 can have some symptoms for weeks or months. People with COVID-19 must isolate themselves. You can start to be around others when your doctor says it is safe to do so.       What care is needed at home?   · Ask your doctor what you need to do when you go home. Make sure you ask questions if you do not understand what the doctor says.  · Drink lots of water, juice, or broth to replace  fluids lost from a fever.  · You may use cool mist humidifiers to help ease congestion and coughing.  · Use 2 to 3 pillows to prop yourself up when you lie down to make it easier to breathe and sleep.  · Do not smoke and do not drink beer, wine, and mixed drinks (alcohol).  · To lower the chance of passing the infection to others, get a COVID-19 vaccine after your infection has resolved.  · If you have not been fully vaccinated:  ? Wear a mask over your mouth and nose if you are around others who are not sick. Cloth masks work best if they have more than one layer of fabric.  ? Wash your hands often.  ? Stay home in a separate room, if possible, away from others. Only go out to get medical care.  ? Use a separate bathroom if possible.  ? Do not make food for others.  What follow-up care is needed?   · Your doctor may ask you to make visits to the office to check on your progress. Be sure to keep these visits. Make sure you wear a mask at these visits.  · If you can, tell the staff you have COVID-19 ahead of time so they can take extra care to stop the disease from spreading.  · It may take a few weeks before your health returns to normal.  What drugs may be needed?   The doctor may order drugs to:  · Help with breathing  · Help with fever  · Help with swelling in your airways and lungs  · Control coughing  · Ease a sore throat  · Help a runny or stuffy nose  Will physical activity be limited?   You may have to limit your physical activity. Talk to your doctor about the right amount of activity for you. If you have been very sick with COVID-19, it can take some time to get your strength back.  Will there be any other care needed?   Doctors do not know how long you can pass the virus on to others after you are sick. This is why it is important to stay in a separate room, if possible, when you are sick. For now, doctors are giving general guidelines for you to follow after you have been sick. Before you go around other  people, you should:  · Be fever free for 24 hours without taking any drugs to lower the fever  · Have no symptoms of cough or shortness of breath  · Wait at least 10 days after first having symptoms or your first positive test, and you need to be symptom free as above. Some experts suggest waiting 20 days if you have had a more severe infection.  Talk with your doctor about getting a COVID-19 vaccine.  What problems could happen?   · Fluid loss. This is dehydration.  · Short-term or long-term lung damage  · Heart problems  · Death  When do I need to call the doctor?   · You are having so much trouble breathing that you can only say one or two words at a time.  · You need to sit upright at all times to be able to breathe and/or cannot lie down.  · You are very confused or cannot stay awake.  · Your lips or skin start to turn blue or grey.  · You think you might be having a medical emergency. Some examples of medical emergencies are:  ? Severe chest pain.  ? Not able to speak or move normally.  · You have trouble breathing when talking or sitting still.  · You have new shortness of breath.  · You become weak or dizzy.  · You have very dark urine or do not pass urine for more than 8 hours.  · You have new or worsening COVID-19 symptoms like:  ? Fever  ? Cough  ? Feeling very tired  ? Shaking chills  ? Headache  ? Trouble swallowing  ? Throwing up  ? Loose stools  ? Reddish purple spots on your fingers or toes  Teach Back: Helping You Understand   The Teach Back Method helps you understand the information we are giving you. After you talk with the staff, tell them in your own words what you learned. This helps to make sure the staff has described each thing clearly. It also helps to explain things that may have been confusing. Before going home, make sure you can do these:  · I can tell you about my condition.  · I can tell you what may help ease my breathing.  · I can tell you what I can do to help avoid passing the  infection to others.  · I can tell you what I will do if I have trouble breathing; feel sleepy or confused; or my fingertips, fingernails, skin, or lips are blue.  Where can I learn more?   Centers for Disease Control and Prevention  https://www.cdc.gov/coronavirus/2019-ncov/about/index.html   Centers for Disease Control and Prevention  https://www.cdc.gov/coronavirus/2019-ncov/hcp/disposition-in-home-patients.html   World Health Organization  https://www.who.int/news-room/q-a-detail/w-s-sgmotqgjlrdka   Last Reviewed Date   2021-10-05  Consumer Information Use and Disclaimer   This information is not specific medical advice and does not replace information you receive from your health care provider. This is only a brief summary of general information. It does NOT include all information about conditions, illnesses, injuries, tests, procedures, treatments, therapies, discharge instructions or life-style choices that may apply to you. You must talk with your health care provider for complete information about your health and treatment options. This information should not be used to decide whether or not to accept your health care providers advice, instructions or recommendations. Only your health care provider has the knowledge and training to provide advice that is right for you.  Copyright   Copyright © 2021 [employer] and its affiliates and/or licensors. All rights reserved.

## 2022-01-20 ENCOUNTER — PATIENT MESSAGE (OUTPATIENT)
Dept: REHABILITATION | Facility: HOSPITAL | Age: 72
End: 2022-01-20
Payer: MEDICARE

## 2022-02-16 ENCOUNTER — TELEPHONE (OUTPATIENT)
Dept: UROLOGY | Facility: CLINIC | Age: 72
End: 2022-02-16
Payer: MEDICARE

## 2022-02-16 NOTE — TELEPHONE ENCOUNTER
Called for 2 month med check/behavior- patient has been sick w/sinus infx and COVID and focusing on that. She has not felt well enough to go back to [facility], but does report bladder symptoms are better. Will check in in 1 month.

## 2022-02-26 NOTE — TELEPHONE ENCOUNTER
Care Due:                  Date            Visit Type   Department     Provider  --------------------------------------------------------------------------------                                [facility]                              PRIMARY      MED/ INTERNAL  Julian Schulz  Last Visit: 10-      CARE ([facility])   MED/ PEDS      Ehrensing  Next Visit: None Scheduled  None         None Found                                                            Last  Test          Frequency    Reason                     Performed    Due Date  --------------------------------------------------------------------------------    Office Visit  12 months..  hydroCHLOROthiazide,       10-   10-                             olmesartan...............    Powered by [employer]. Reference number: [id number].   2/26/2022 12:38:29 PM CST

## 2022-02-27 RX ORDER — AZELASTINE 1 MG/ML
SPRAY, METERED NASAL
Qty: 120 ML | Refills: 12 | Status: SHIPPED | OUTPATIENT
Start: 2022-02-27 | End: 2023-06-02 | Stop reason: SDUPTHER

## 2022-03-15 ENCOUNTER — PATIENT MESSAGE (OUTPATIENT)
Dept: UROLOGY | Facility: CLINIC | Age: 72
End: 2022-03-15
Payer: MEDICARE

## 2022-04-09 ENCOUNTER — OFFICE VISIT (OUTPATIENT)
Dept: URGENT CARE | Facility: CLINIC | Age: 72
End: 2022-04-09
Payer: COMMERCIAL

## 2022-04-09 VITALS
OXYGEN SATURATION: 98 % | HEIGHT: 61 IN | RESPIRATION RATE: 16 BRPM | DIASTOLIC BLOOD PRESSURE: 73 MMHG | TEMPERATURE: 98 F | BODY MASS INDEX: 24.55 KG/M2 | HEART RATE: 69 BPM | SYSTOLIC BLOOD PRESSURE: 157 MMHG | WEIGHT: 130 LBS

## 2022-04-09 DIAGNOSIS — L03.211 CELLULITIS OF FACE: ICD-10-CM

## 2022-04-09 DIAGNOSIS — H00.015 HORDEOLUM EXTERNUM OF LEFT LOWER EYELID: Primary | ICD-10-CM

## 2022-04-09 PROCEDURE — 3008F BODY MASS INDEX DOCD: CPT | Mod: CPTII,S$GLB,, | Performed by: FAMILY MEDICINE

## 2022-04-09 PROCEDURE — 3077F SYST BP >= 140 MM HG: CPT | Mod: CPTII,S$GLB,, | Performed by: FAMILY MEDICINE

## 2022-04-09 PROCEDURE — 1159F MED LIST DOCD IN RCRD: CPT | Mod: CPTII,S$GLB,, | Performed by: FAMILY MEDICINE

## 2022-04-09 PROCEDURE — 3077F PR MOST RECENT SYSTOLIC BLOOD PRESSURE >= 140 MM HG: ICD-10-PCS | Mod: CPTII,S$GLB,, | Performed by: FAMILY MEDICINE

## 2022-04-09 PROCEDURE — 99213 OFFICE O/P EST LOW 20 MIN: CPT | Mod: S$GLB,,, | Performed by: FAMILY MEDICINE

## 2022-04-09 PROCEDURE — 1125F PR PAIN SEVERITY QUANTIFIED, PAIN PRESENT: ICD-10-PCS | Mod: CPTII,S$GLB,, | Performed by: FAMILY MEDICINE

## 2022-04-09 PROCEDURE — 1160F RVW MEDS BY RX/DR IN RCRD: CPT | Mod: CPTII,S$GLB,, | Performed by: FAMILY MEDICINE

## 2022-04-09 PROCEDURE — 3078F DIAST BP <80 MM HG: CPT | Mod: CPTII,S$GLB,, | Performed by: FAMILY MEDICINE

## 2022-04-09 PROCEDURE — 1125F AMNT PAIN NOTED PAIN PRSNT: CPT | Mod: CPTII,S$GLB,, | Performed by: FAMILY MEDICINE

## 2022-04-09 PROCEDURE — 99213 PR OFFICE/OUTPT VISIT, EST, LEVL III, 20-29 MIN: ICD-10-PCS | Mod: S$GLB,,, | Performed by: FAMILY MEDICINE

## 2022-04-09 PROCEDURE — 3008F PR BODY MASS INDEX (BMI) DOCUMENTED: ICD-10-PCS | Mod: CPTII,S$GLB,, | Performed by: FAMILY MEDICINE

## 2022-04-09 PROCEDURE — 1160F PR REVIEW ALL MEDS BY PRESCRIBER/CLIN PHARMACIST DOCUMENTED: ICD-10-PCS | Mod: CPTII,S$GLB,, | Performed by: FAMILY MEDICINE

## 2022-04-09 PROCEDURE — 3078F PR MOST RECENT DIASTOLIC BLOOD PRESSURE < 80 MM HG: ICD-10-PCS | Mod: CPTII,S$GLB,, | Performed by: FAMILY MEDICINE

## 2022-04-09 PROCEDURE — 1159F PR MEDICATION LIST DOCUMENTED IN MEDICAL RECORD: ICD-10-PCS | Mod: CPTII,S$GLB,, | Performed by: FAMILY MEDICINE

## 2022-04-09 RX ORDER — ERYTHROMYCIN 5 MG/G
OINTMENT OPHTHALMIC NIGHTLY
Qty: 1 G | Refills: 0 | Status: SHIPPED | OUTPATIENT
Start: 2022-04-09 | End: 2022-04-14

## 2022-04-09 RX ORDER — AMOXICILLIN AND CLAVULANATE POTASSIUM 875; 125 MG/1; MG/1
1 TABLET, FILM COATED ORAL EVERY 12 HOURS
Qty: 14 TABLET | Refills: 0 | Status: SHIPPED | OUTPATIENT
Start: 2022-04-09 | End: 2022-04-16

## 2022-04-09 NOTE — PATIENT INSTRUCTIONS
General Discharge Instructions   PLEASE READ YOUR DISCHARGE INSTRUCTIONS ENTIRELY AS IT CONTAINS IMPORTANT INFORMATION.  If you were prescribed a narcotic or controlled medication, do not drive or operate heavy equipment or machinery while taking these medications.  If you were prescribed antibiotics, please take them to completion.  You must understand that you've received an Urgent Care treatment only and that you may be released before all your medical problems are known or treated. You, the patient, will arrange for follow up care as instructed.    OVER THE COUNTER RECOMMENDATIONS/SUGGESTIONS.    Make sure to stay well hydrated.    Use Nasal Saline to mechanically move any post nasal drip from your eustachian tube or from the back of your throat.    Use warm salt water gargles to ease your throat pain. Warm salt water gargles as needed for sore throat- 1/2 tsp salt to 1 cup warm water, gargle as desired.    Use an antihistamine such as Claritin, Zyrtec or Allegra to dry you out.    Use pseudoephedrine (behind the counter) to decongest. Pseudoephedrine 30 mg up to 240 mg /day. It can raise your blood pressure and give you palpitations.    Use mucinex (guaifenesin) to break up mucous up to 2400mg/day to loosen any mucous.    The mucinex DM pill has a cough suppressant that can be sedating. It can be used at night to stop the tickle at the back of your throat.    You can use Mucinex D (it has guaifenesin and a high dose of pseudoephedrine) in the mornings to help decongest.    Use Afrin in each nare for no longer than 3 days, as it is addictive. It can also dry out your mucous membranes and cause elevated blood pressure. This is especially useful if you are flying.    Use Flonase 1-2 sprays/nostril per day. It is a local acting steroid nasal spray, if you develop a bloody nose, stop using the medication immediately.    Sometimes Nyquil at night is beneficial to help you get some rest, however it is sedating and it  does have an antihistamine, and tylenol.    Honey is a natural cough suppressant that can be used.    Tylenol up to 4,000 mg a day is safe for short periods and can be used for body aches, pain, and fever. However in high doses and prolonged use it can cause liver irritation.    Ibuprofen is a non-steroidal anti-inflammatory that can be used for body aches, pain, and fever.However it can also cause stomach irritation if over used.     Follow up with your PCP or specialty clinic as instructed in the next 2-3 days if not improved or as needed. You can call [phone] to schedule an appointment with appropriate provider.      If you condition worsens, we recommend that you receive another evaluation at the emergency room immediately or contact your primary medical clinic's after hours call service to discuss your concerns.      Please return here or go to the Emergency Department for any concerns or worsening condition.   You can also call [phone] to schedule an appointment with the appropriate provider.    Please return here or go to the Emergency Department for any concerns or worsening of condition.    Thank you for choosing [facility] [place]!    Our goal in the Urgent Care is to always provide outstanding medical care. You may receive a survey by mail or e-mail in the next week regarding your experience today. We would greatly appreciate you completing and returning the survey. Your feedback provides us with a way to recognize our staff who provide very good care, and it helps us learn how to improve when your experience was below our aspiration of excellence.      We appreciate you trusting us with your medical care. We hope you feel better soon. We will be happy to take care of you for all of your future medical needs.    Sincerely,    CHIO Townsend                                              EYE   If your condition worsens or fails to improve we recommend that you receive another evaluation  at the ER immediately or contact your PCP to discuss your concerns or return here. You must understand that you've received an urgent care treatment only and that you may be released before all your medical problems are known or treated. You the patient will arrange for followup care as instructed.   Use the eye drops every 2 hours while awake initially. Once the eye redness decreases after a few days then decrease the frequency to three times a day. Use the eye drops for 24 hours after the last day of eye symptoms.   Do not wear your contact lens ( if you use them) for at least 5 days after you stop having symptoms and are rechecked by your doctor. Throw away the contacts, contact solution and carrying case you were using and start with new material.   If you develop increase eye symptoms or change in your vision seek medical care immediately either with your ophthalomologist or the ER or return here.

## 2022-04-09 NOTE — PROGRESS NOTES
"Subjective:       Patient ID: Aleksandra Santana is a 71 y.o. female.    Vitals:  height is 5' 1" (1.549 m) and weight is 59 kg (130 lb). Her temperature is 97.9 °F (36.6 °C). Her blood pressure is 157/73 (abnormal) and her pulse is 69. Her respiration is 16 and oxygen saturation is 98%.     Chief Complaint: Stye    Patient stated she thinks she has a left stye infection.  She has crust and discharge coming from her eye.  She has left eye redness.  She denies any blurred vision or fever.  She denies any history of styes.  She noticed the stye starting on Wednesday.  The redness has improved since yesterday.    Eye Pain   The left eye is affected. This is a new problem. The current episode started in the past 7 days. The problem has been unchanged. There was no injury mechanism. The pain is at a severity of 7/10. The pain is moderate. There is no known exposure to pink eye. She does not wear contacts. Associated symptoms include an eye discharge, eye redness and itching. Pertinent negatives include no blurred vision, double vision, fever, foreign body sensation, nausea, photophobia, recent URI or vomiting. She has tried water for the symptoms. The treatment provided mild relief.       Constitution: Negative for activity change, appetite change, chills, sweating, fatigue, fever, unexpected weight change and generalized weakness.   Eyes: Positive for eye discharge, eye itching, eye pain and eye redness. Negative for photophobia, double vision and blurred vision.   Gastrointestinal: Negative for nausea and vomiting.       Objective:      Physical Exam   Constitutional: She is oriented to person, place, and time. She appears well-developed.  Non-toxic appearance. She does not appear ill. No distress.   HENT:   Head: Normocephalic and atraumatic.   Ears:   Right Ear: External ear normal.   Left Ear: External ear normal.   Nose: Nose normal.   Mouth/Throat: Oropharynx is clear and moist.   Eyes: Conjunctivae, EOM and lids are " "normal. Pupils are equal, round, and reactive to light. Right eye exhibits no discharge and no hordeolum. No foreign body present in the right eye. Left eye exhibits discharge and hordeolum. No foreign body present in the left eye. Right eye exhibits normal extraocular motion and no nystagmus. Left eye exhibits normal extraocular motion and no nystagmus. Pupils are equal.      extraocular movement intact gaze aligned appropriately      Comments: Erythema and warmth extending beyond the stye on the left.  She does note that the redness has improved since yesterday. No pain with EOM.    Neck: Trachea normal and phonation normal. Neck supple.   Musculoskeletal: Normal range of motion.         General: Normal range of motion.   Neurological: She is alert and oriented to person, place, and time.   Skin: Skin is warm, dry, intact and not diaphoretic.   Psychiatric: Her speech is normal and behavior is normal. Judgment and thought content normal.   Nursing note and vitals reviewed.              Assessment:       1. Hordeolum externum of left lower eyelid    2. Cellulitis of face         Hearing Screening    125Hz 250Hz 500Hz 1000Hz 2000Hz 3000Hz 4000Hz 6000Hz 8000Hz   Right ear:            Left ear:               Visual Acuity Screening    Right eye Left eye Both eyes   Without correction: 20/40 20/40 20/40   With correction:         Plan:       Patient wears glasses no contacts.  Concern for possible preseptal cellulitis will cover with oral antibiotics. Per uptodate :  "?Initial treatment - We suggest empiric monotherapy with amoxicillin-clavulanic acid. Although amoxicillin-clavulanic acid is not active against methicillin-resistant S. aureus (MRSA), MRSA is uncommon in this setting, so initial empiric therapy against MRSA may not be indicated. If no clinical improvement - If there is minimal or no clinical improvement within 24 to 48 hours, therapy against MRSA should be added."  Have placed a referral for " Ophthalmology, she reports she is out of date and does need a routine eye exam.  Last eye exam over year ago.  Place referral to Ophthalmology for close follow-up, advise if no better in the next 24-24 h, pain with extraocular movement, increased redness must seek follow-up, fever, body aches, chills seek fu. Encouraged probiotics while taking abx.    Discussed results/diagnosis/plan with patient in clinic. Strict precautions given to patient to monitor for worsening signs and symptoms. Advised to follow up with PCP or specialist.    Explained side effects of medications prescribed with patient and informed him/her to discontinue use if he/she has any side effects and to inform UC or PCP if this occurs. All questions answered. Strict ED verses clinic return precautions stressed and given in depth. Advised if symptoms worsens of fail to improve he/she should go to the Emergency Room. Discharge and follow-up instructions given verbally/printed with the patient who expressed understanding and willingness to comply with my recommendations. Patient voiced understanding and in agreement with current treatment plan. Patient exits the exam room in no acute distress. Conversant and engaged during discharge discussion, verbalized understanding.      Hordeolum externum of left lower eyelid  -     erythromycin (ROMYCIN) ophthalmic ointment; Place into the left eye every evening. for 5 days  Dispense: 1 g; Refill: 0  -     Ambulatory referral/consult to Ophthalmology    Cellulitis of face  -     amoxicillin-clavulanate 875-125mg (AUGMENTIN) 875-125 mg per tablet; Take 1 tablet by mouth every 12 (twelve) hours. for 7 days  Dispense: 14 tablet; Refill: 0                 Patient Instructions   General Discharge Instructions   PLEASE READ YOUR DISCHARGE INSTRUCTIONS ENTIRELY AS IT CONTAINS IMPORTANT INFORMATION.  If you were prescribed a narcotic or controlled medication, do not drive or operate heavy equipment or machinery while  taking these medications.  If you were prescribed antibiotics, please take them to completion.  You must understand that you've received an Urgent Care treatment only and that you may be released before all your medical problems are known or treated. You, the patient, will arrange for follow up care as instructed.    OVER THE COUNTER RECOMMENDATIONS/SUGGESTIONS.    Make sure to stay well hydrated.    Use Nasal Saline to mechanically move any post nasal drip from your eustachian tube or from the back of your throat.    Use warm salt water gargles to ease your throat pain. Warm salt water gargles as needed for sore throat- 1/2 tsp salt to 1 cup warm water, gargle as desired.    Use an antihistamine such as Claritin, Zyrtec or Allegra to dry you out.    Use pseudoephedrine (behind the counter) to decongest. Pseudoephedrine 30 mg up to 240 mg /day. It can raise your blood pressure and give you palpitations.    Use mucinex (guaifenesin) to break up mucous up to 2400mg/day to loosen any mucous.    The mucinex DM pill has a cough suppressant that can be sedating. It can be used at night to stop the tickle at the back of your throat.    You can use Mucinex D (it has guaifenesin and a high dose of pseudoephedrine) in the mornings to help decongest.    Use Afrin in each nare for no longer than 3 days, as it is addictive. It can also dry out your mucous membranes and cause elevated blood pressure. This is especially useful if you are flying.    Use Flonase 1-2 sprays/nostril per day. It is a local acting steroid nasal spray, if you develop a bloody nose, stop using the medication immediately.    Sometimes Nyquil at night is beneficial to help you get some rest, however it is sedating and it does have an antihistamine, and tylenol.    Honey is a natural cough suppressant that can be used.    Tylenol up to 4,000 mg a day is safe for short periods and can be used for body aches, pain, and fever. However in high doses and  prolonged use it can cause liver irritation.    Ibuprofen is a non-steroidal anti-inflammatory that can be used for body aches, pain, and fever.However it can also cause stomach irritation if over used.     Follow up with your PCP or specialty clinic as instructed in the next 2-3 days if not improved or as needed. You can call [phone] to schedule an appointment with appropriate provider.      If you condition worsens, we recommend that you receive another evaluation at the emergency room immediately or contact your primary medical clinic's after hours call service to discuss your concerns.      Please return here or go to the Emergency Department for any concerns or worsening condition.   You can also call [phone] to schedule an appointment with the appropriate provider.    Please return here or go to the Emergency Department for any concerns or worsening of condition.    Thank you for choosing [facility]!    Our goal in the Urgent Care is to always provide outstanding medical care. You may receive a survey by mail or e-mail in the next week regarding your experience today. We would greatly appreciate you completing and returning the survey. Your feedback provides us with a way to recognize our staff who provide very good care, and it helps us learn how to improve when your experience was below our aspiration of excellence.      We appreciate you trusting us with your medical care. We hope you feel better soon. We will be happy to take care of you for all of your future medical needs.    Sincerely,    CHIO Townsend                                              EYE   If your condition worsens or fails to improve we recommend that you receive another evaluation at the ER immediately or contact your PCP to discuss your concerns or return here. You must understand that you've received an urgent care treatment only and that you may be released before all your medical problems are known or  treated. You the patient will arrange for followup care as instructed.   Use the eye drops every 2 hours while awake initially. Once the eye redness decreases after a few days then decrease the frequency to three times a day. Use the eye drops for 24 hours after the last day of eye symptoms.   Do not wear your contact lens ( if you use them) for at least 5 days after you stop having symptoms and are rechecked by your doctor. Throw away the contacts, contact solution and carrying case you were using and start with new material.   If you develop increase eye symptoms or change in your vision seek medical care immediately either with your ophthalomologist or the ER or return here.

## 2022-04-18 ENCOUNTER — TELEPHONE (OUTPATIENT)
Dept: UROLOGY | Facility: CLINIC | Age: 72
End: 2022-04-18
Payer: MEDICARE

## 2022-05-31 ENCOUNTER — PATIENT MESSAGE (OUTPATIENT)
Dept: ADMINISTRATIVE | Facility: HOSPITAL | Age: 72
End: 2022-05-31
Payer: MEDICARE

## 2022-06-01 RX ORDER — FOLIC ACID 1 MG/1
TABLET ORAL
Qty: 90 TABLET | Refills: 12 | Status: SHIPPED | OUTPATIENT
Start: 2022-06-01 | End: 2023-06-29

## 2022-06-10 ENCOUNTER — HOSPITAL ENCOUNTER (EMERGENCY)
Facility: HOSPITAL | Age: 72
Discharge: HOME | End: 2022-06-10
Attending: EMERGENCY MEDICINE
Payer: MEDICARE

## 2022-06-10 VITALS
HEIGHT: 61 IN | RESPIRATION RATE: 18 BRPM | SYSTOLIC BLOOD PRESSURE: 141 MMHG | BODY MASS INDEX: 24.55 KG/M2 | HEART RATE: 67 BPM | TEMPERATURE: 98 F | OXYGEN SATURATION: 100 % | WEIGHT: 130 LBS | DIASTOLIC BLOOD PRESSURE: 70 MMHG
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
## 2022-06-10 PROCEDURE — 90675 RABIES VACCINE IM: CPT | Mod: JG,HCNC | Performed by: PHYSICIAN ASSISTANT

## 2022-06-10 PROCEDURE — 90375 RABIES IG IM/SC: CPT | Mod: JG,HCNC | Performed by: PHYSICIAN ASSISTANT

## 2022-06-10 PROCEDURE — 63600175 PHARM REV CODE 636 W HCPCS: Mod: JG,HCNC | Performed by: PHYSICIAN ASSISTANT

## 2022-06-10 PROCEDURE — 90471 IMMUNIZATION ADMIN: CPT | Mod: HCNC | Performed by: PHYSICIAN ASSISTANT

## 2022-06-10 PROCEDURE — 99284 EMERGENCY DEPT VISIT MOD MDM: CPT | Mod: 25

## 2022-06-10 PROCEDURE — 25000003 PHARM REV CODE 250: Mod: HCNC | Performed by: PHYSICIAN ASSISTANT

## 2022-06-10 RX ORDER — AMOXICILLIN AND CLAVULANATE POTASSIUM 875; 125 MG/1; MG/1
1 TABLET, FILM COATED ORAL 2 TIMES DAILY
Qty: 14 TABLET | Refills: 0 | Status: SHIPPED | OUTPATIENT
Start: 2022-06-10 | End: 2022-06-17

## 2022-06-10 RX ORDER — ACETAMINOPHEN 325 MG/1
650 TABLET ORAL
Status: COMPLETED | OUTPATIENT
Start: 2022-06-10 | End: 2022-06-10

## 2022-06-10 RX ORDER — AMOXICILLIN AND CLAVULANATE POTASSIUM 875; 125 MG/1; MG/1
1 TABLET, FILM COATED ORAL
Status: COMPLETED | OUTPATIENT
Start: 2022-06-10 | End: 2022-06-10

## 2022-06-10 RX ADMIN — AMOXICILLIN AND CLAVULANATE POTASSIUM 1 TABLET: 875; 125 TABLET, FILM COATED ORAL at 04:06

## 2022-06-10 RX ADMIN — RABIES IMMUNE GLOBULIN (HUMAN) 1170 UNITS: 300 INJECTION, SOLUTION INFILTRATION; INTRAMUSCULAR at 04:06

## 2022-06-10 RX ADMIN — ACETAMINOPHEN 650 MG: 325 TABLET ORAL at 04:06

## 2022-06-10 RX ADMIN — Medication 2.5 UNITS: at 04:06

## 2022-06-10 NOTE — DISCHARGE INSTRUCTIONS
Take antibiotics as prescribed, try to take with meals to limit nausea.  Tylenol or ibuprofen as needed for pain.  Ice to the area of the bite may help with swelling and discomfort.    Return to this ED on 6/13, 6/17, 6/24 for remainder of rabies series.    Return to this ED if wound becomes red and warm or begins to drain foul-smelling fluid or pain increases despite 48 hours of antibiotics, if you begin with fever, if any other problems occur.

## 2022-06-10 NOTE — ED TRIAGE NOTES
Pt reports being bit by stray cat. Pt states she does not know if the cat was rabies vaccinated. Pt also states that her tetanus is current and up to date.

## 2022-06-10 NOTE — ED PROVIDER NOTES
Encounter Date: 6/10/2022       History     Chief Complaint   Patient presents with    Animal Bite     Pt presents to ED c/o cat bite right lower leg occurred around 2230 last night.  Denies any other symptoms.  Updated on tetanus.  Pain 5/10.       72-year-old female presents to ED complaining of right calf cat bite, cat scratches to right lower leg sustained this evening.    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 She states one of the cats scratched her, bit her R calf this evening.  This particular feline is not immunized to her knowledge.  She is unable to confidently monitor the cat for the next 10d.  She denies any drainage from the wound, however states area is very tender.  No joint involvement.  No fever, chills, myalgias.        Review of patient's allergies indicates:   Allergen Reactions    Codeine     Iodine and iodide containing products     Iodinated contrast media      Rash (skin)^  Rash (skin)^    Mobic [meloxicam]     Doxycycline Rash     Past Medical History:   Diagnosis Date    Abnormal EKG     Allergy     seasonal    Anxiety     Breast cyst     Cataract     Fibrocystic breast     GERD (gastroesophageal reflux disease)     History of colonic polyps     1 polyp  --5 yrs.    Hypertension     Hypothyroidism     IBS (irritable bowel syndrome)     Keloid cicatrix     Migraine syndrome     OA (osteoarthritis)     Osteopenia     BMD  --no tx -repeat 2 yrs    Osteoporosis, postmenopausal     Palpitations     SOB (shortness of breath)      Past Surgical History:   Procedure Laterality Date    APPENDECTOMY       SECTION      x2    HYSTERECTOMY  age 33    COLEEN/BSO for endometriosis    OOPHORECTOMY      TONSILLECTOMY       Family History   Problem Relation Age of Onset    Cataracts Mother     No Known Problems Father     Ovarian cancer Sister     No Known Problems Brother     No Known Problems Maternal Aunt      No Known Problems Maternal Uncle     No Known Problems Paternal Aunt     No Known Problems Paternal Uncle     No Known Problems Maternal Grandmother     No Known Problems Maternal Grandfather     No Known Problems Paternal Grandmother     No Known Problems Paternal Grandfather     Colon cancer Neg Hx     Breast cancer Neg Hx     Amblyopia Neg Hx     Blindness Neg Hx     Cancer Neg Hx     Diabetes Neg Hx     Glaucoma Neg Hx     Hypertension Neg Hx     Macular degeneration Neg Hx     Retinal detachment Neg Hx     Strabismus Neg Hx     Stroke Neg Hx     Thyroid disease Neg Hx     Melanoma Neg Hx      Social History     Tobacco Use    Smoking status: Never Smoker    Smokeless tobacco: Never Used   Substance Use Topics    Alcohol use: Yes     Alcohol/week: 1.0 standard drink     Types: 1 Glasses of wine per week     Comment: ocasionally    Drug use: No     Review of Systems   Constitutional: Negative for chills and fever.   Musculoskeletal: Negative for arthralgias, joint swelling and myalgias.   Skin: Positive for wound.       Physical Exam     Initial Vitals [06/10/22 0138]   BP Pulse Resp Temp SpO2   (!) 159/70 77 18 97.9 °F (36.6 °C) 98 %      MAP       --         Physical Exam    Nursing note and vitals reviewed.  Constitutional: She appears well-developed and well-nourished. She is not diaphoretic. No distress.   HENT:   Head: Normocephalic and atraumatic.   Neck: Neck supple.   Musculoskeletal:      Cervical back: Neck supple.      Comments: Right calf with for superficial puncture wounds, underlying ecchymosis, associated tenderness.  No erythema.  No drainage.  Scant, scattered superficial wounds to her right anterior lower leg.     Neurological: She is alert and oriented to person, place, and time. GCS score is 15. GCS eye subscore is 4. GCS verbal subscore is 5. GCS motor subscore is 6.   Skin: Skin is warm. Capillary refill takes less than 2 seconds.   Psychiatric: She has a normal  mood and affect. Thought content normal.         ED Course   Procedures  Labs Reviewed - No data to display       Imaging Results    None          Medications   amoxicillin-clavulanate 875-125mg per tablet 1 tablet (1 tablet Oral Given 6/10/22 0440)   acetaminophen tablet 650 mg (650 mg Oral Given 6/10/22 0440)   rabies vaccine, PCEC injection 2.5 Units (2.5 Units Intramuscular Given 6/10/22 0438)   rabies immune globulin (PF) (HYPERRAB) injection 1,170 Units (1,170 Units Other Given 6/10/22 0439)     Medical Decision Making:   Differential Diagnosis:   Infected wound, cellulitis, retained foreign body  ED Management:  Will place on Augmentin for coverage of cat bite.  After discussion, given that she is unable to monitor the animal for the next 10 days, she will proceed with rabies immunization series.                      Clinical Impression:   Final diagnoses:  [W55.01XA] Cat bite, initial encounter (Primary)          ED Disposition Condition    Discharge Stable        ED Prescriptions     Medication Sig Dispense Start Date End Date Auth. Provider    amoxicillin-clavulanate 875-125mg (AUGMENTIN) 875-125 mg per tablet Take 1 tablet by mouth 2 (two) times daily. for 7 days 14 tablet 6/10/2022 6/17/2022 Bishop Ang PA-C        Follow-up Information     Follow up With Specialties Details Why Contact Info    Julian Lee MD Internal Medicine Schedule an appointment as soon as possible for a visit  For wound re-check, For reevaluation, If symptoms persist [address] [place] [address]  [phone]             Bishop Ang PA-C  06/10/22 4572

## 2022-06-11 NOTE — TELEPHONE ENCOUNTER
Refill Routing Note   Medication(s) are not appropriate for processing by [facility] for the following reason(s):      - Patient has not been seen in over 15 months by PCP  - Required laboratory values are outdated  - Patient has been seen in the ED/Hospital since the last PCP visit    [facility] action(s):  Defer Medication-related problems identified:   Requires appointment  Requires labs        Medication reconciliation completed: No     Appointments  past 12m or future 3m with PCP    Date Provider   Last Visit   10/15/2020 Julian Lee MD   Next Visit   Visit date not found Julian Lee MD   ED visits in past 90 days: 1        Note composed:7:27 PM 06/10/2022

## 2022-06-11 NOTE — TELEPHONE ENCOUNTER
Care Due:                  Date            Visit Type   Department     Provider  --------------------------------------------------------------------------------                                [facility]                              PRIMARY      MED/ INTERNAL  Julian Schulz  Last Visit: 10-      CARE ([facility])   MED/ PEDS      Ehrensing  Next Visit: None Scheduled  None         None Found                                                            Last  Test          Frequency    Reason                     Performed    Due Date  --------------------------------------------------------------------------------    Office Visit  12 months..  hydroCHLOROthiazide,       10-   10-                             olmesartan...............    CMP.........  12 months..  hydroCHLOROthiazide,       10-   10-                             olmesartan...............    [employer] Embedded Care Gaps. Reference number: [id number]. 6/10/2022   7:17:13 PM CDT

## 2022-06-13 ENCOUNTER — HOSPITAL ENCOUNTER (EMERGENCY)
Facility: HOSPITAL | Age: 72
Discharge: HOME | End: 2022-06-13
Attending: INTERNAL MEDICINE
Payer: MEDICARE

## 2022-06-13 VITALS
HEIGHT: 61 IN | HEART RATE: 70 BPM | OXYGEN SATURATION: 98 % | SYSTOLIC BLOOD PRESSURE: 124 MMHG | TEMPERATURE: 98 F | DIASTOLIC BLOOD PRESSURE: 61 MMHG | BODY MASS INDEX: 24.73 KG/M2 | RESPIRATION RATE: 19 BRPM | WEIGHT: 131 LBS

## 2022-06-13 DIAGNOSIS — Z23 ENCOUNTER FOR REPEAT ADMINISTRATION OF RABIES VACCINATION: Primary | ICD-10-CM

## 2022-06-13 PROCEDURE — 90471 IMMUNIZATION ADMIN: CPT | Mod: HCNC | Performed by: PHYSICIAN ASSISTANT

## 2022-06-13 PROCEDURE — 63600175 PHARM REV CODE 636 W HCPCS: Mod: JG,HCNC | Performed by: PHYSICIAN ASSISTANT

## 2022-06-13 PROCEDURE — 90675 RABIES VACCINE IM: CPT | Mod: JG,HCNC | Performed by: PHYSICIAN ASSISTANT

## 2022-06-13 PROCEDURE — 99900062 HC AFTER HR RABIES VACCINE ED REGISTRATION

## 2022-06-13 RX ORDER — AMLODIPINE BESYLATE 5 MG/1
TABLET ORAL
Qty: 90 TABLET | Refills: 11 | Status: SHIPPED | OUTPATIENT
Start: 2022-06-13 | End: 2022-09-06 | Stop reason: SDUPTHER

## 2022-06-13 RX ADMIN — RABIES VACCINE 2.5 UNITS: KIT at 09:06

## 2022-06-14 NOTE — ED PROVIDER NOTES
Encounter Date: 2022       History     Chief Complaint   Patient presents with    Rabies Injection     Presents for follow-up rabies series injection     Chief Complaint:  Rabies vaccination  History of  Present Illness: History obtained from patient. This 72 y.o. female presents to the ED complaining of for repeat rabies vaccination after being bitten by stray cat 3 days ago to the right calf.  Patient reports compliance with prescribed antibiotics.  Denies fever.          Review of patient's allergies indicates:   Allergen Reactions    Codeine     Iodine and iodide containing products     Iodinated contrast media      Rash (skin)^  Rash (skin)^    Mobic [meloxicam]     Doxycycline Rash     Past Medical History:   Diagnosis Date    Abnormal EKG     Allergy     seasonal    Anxiety     Breast cyst     Cataract     Fibrocystic breast     GERD (gastroesophageal reflux disease)     History of colonic polyps     1 polyp  --5 yrs.    Hypertension     Hypothyroidism     IBS (irritable bowel syndrome)     Keloid cicatrix     Migraine syndrome     OA (osteoarthritis)     Osteopenia     BMD  --no tx -repeat 2 yrs    Osteoporosis, postmenopausal     Palpitations     SOB (shortness of breath)      Past Surgical History:   Procedure Laterality Date    APPENDECTOMY       SECTION      x2    HYSTERECTOMY  age 33    COLEEN/BSO for endometriosis    OOPHORECTOMY      TONSILLECTOMY       Family History   Problem Relation Age of Onset    Cataracts Mother     No Known Problems Father     Ovarian cancer Sister     No Known Problems Brother     No Known Problems Maternal Aunt     No Known Problems Maternal Uncle     No Known Problems Paternal Aunt     No Known Problems Paternal Uncle     No Known Problems Maternal Grandmother     No Known Problems Maternal Grandfather     No Known Problems Paternal Grandmother     No Known Problems Paternal Grandfather     Colon cancer Neg Hx      Breast cancer Neg Hx     Amblyopia Neg Hx     Blindness Neg Hx     Cancer Neg Hx     Diabetes Neg Hx     Glaucoma Neg Hx     Hypertension Neg Hx     Macular degeneration Neg Hx     Retinal detachment Neg Hx     Strabismus Neg Hx     Stroke Neg Hx     Thyroid disease Neg Hx     Melanoma Neg Hx      Social History     Tobacco Use    Smoking status: Never Smoker    Smokeless tobacco: Never Used   Substance Use Topics    Alcohol use: Yes     Alcohol/week: 1.0 standard drink     Types: 1 Glasses of wine per week     Comment: ocasionally    Drug use: No     Review of Systems   Constitutional: Negative for fever.   Gastrointestinal: Negative for nausea and vomiting.   Skin: Positive for wound. Negative for color change.       Physical Exam     Initial Vitals [06/13/22 2118]   BP Pulse Resp Temp SpO2   124/61 70 19 98 °F (36.7 °C) 98 %      MAP       --         Physical Exam    Nursing note and vitals reviewed.  Constitutional: She appears well-developed and well-nourished. She is active.  Non-toxic appearance. She does not have a sickly appearance. She does not appear ill.   HENT:   Head: Normocephalic and atraumatic.   Nose: Nose normal.   Mouth/Throat: Uvula is midline, oropharynx is clear and moist and mucous membranes are normal.   Eyes: Conjunctivae, EOM and lids are normal. Pupils are equal, round, and reactive to light.   Neck: Neck supple.   Normal range of motion.   Full passive range of motion without pain.     Cardiovascular: Normal rate and regular rhythm.   Pulses:       Dorsalis pedis pulses are 2+ on the right side and 2+ on the left side.        Posterior tibial pulses are 2+ on the right side and 2+ on the left side.   Musculoskeletal:      Cervical back: Full passive range of motion without pain, normal range of motion and neck supple.      Comments: There is full range of motion the right lower extremity against resistance.  All compartments are soft.     Neurological: She is alert  and oriented to person, place, and time.   Skin: Skin is warm. Capillary refill takes less than 2 seconds.   There are bite marks present to the right calf with surrounding erythema and induration.  No active drainage.  No ascending erythema.         ED Course   Procedures  Labs Reviewed - No data to display       Imaging Results    None          Medications   rabies vaccine, PCEC injection 2.5 Units (has no administration in time range)     Medical Decision Making:   ED Management:  This is an evaluation of a 72 y.o. female who presents to the ED for repeat rabies vaccination.  Vital signs are stable.   Afebrile.  Patient is nontoxic appearing and in no acute distress.     Will administer repeat rabies vaccination.  Patient instructed to return for serial rabies vaccines.    Patient given return precautions and instructed to return to the emergency department for any new or worsening symptoms. Patient verbalized understanding and agreed with plan. Encouraged follow-up with PCP.                        Clinical Impression:   Final diagnoses:  [Z23] Encounter for repeat administration of rabies vaccination (Primary)          ED Disposition Condition    Discharge Stable        ED Prescriptions     None        Follow-up Information     Follow up With Specialties Details Why Contact Info    Julian Lee MD Internal Medicine   [address] [place] [address]  [phone]      [facility] Go in 1 day If symptoms worsen [address]  [facility] [id number] [phone]           Reji Dao PA-C  06/13/22 9154

## 2022-06-17 PROCEDURE — 99283 EMERGENCY DEPT VISIT LOW MDM: CPT

## 2022-06-18 ENCOUNTER — HOSPITAL ENCOUNTER (EMERGENCY)
Facility: HOSPITAL | Age: 72
Discharge: HOME | End: 2022-06-18
Attending: EMERGENCY MEDICINE
Payer: MEDICARE

## 2022-06-18 VITALS
WEIGHT: 131 LBS | RESPIRATION RATE: 18 BRPM | DIASTOLIC BLOOD PRESSURE: 63 MMHG | OXYGEN SATURATION: 99 % | SYSTOLIC BLOOD PRESSURE: 118 MMHG | HEIGHT: 61 IN | TEMPERATURE: 98 F | HEART RATE: 68 BPM | BODY MASS INDEX: 24.73 KG/M2

## 2022-06-18 DIAGNOSIS — Z23 ENCOUNTER FOR REPEAT ADMINISTRATION OF RABIES VACCINATION: Primary | ICD-10-CM

## 2022-06-18 PROCEDURE — 90471 IMMUNIZATION ADMIN: CPT | Performed by: PHYSICIAN ASSISTANT

## 2022-06-18 PROCEDURE — 63600175 PHARM REV CODE 636 W HCPCS: Mod: JG | Performed by: PHYSICIAN ASSISTANT

## 2022-06-18 PROCEDURE — 90675 RABIES VACCINE IM: CPT | Mod: JG | Performed by: PHYSICIAN ASSISTANT

## 2022-06-18 PROCEDURE — 25000003 PHARM REV CODE 250: Performed by: PHYSICIAN ASSISTANT

## 2022-06-18 RX ORDER — MUPIROCIN 20 MG/G
1 OINTMENT TOPICAL
Status: COMPLETED | OUTPATIENT
Start: 2022-06-18 | End: 2022-06-18

## 2022-06-18 RX ORDER — AMOXICILLIN AND CLAVULANATE POTASSIUM 875; 125 MG/1; MG/1
1 TABLET, FILM COATED ORAL 2 TIMES DAILY
Qty: 10 TABLET | Refills: 0 | Status: SHIPPED | OUTPATIENT
Start: 2022-06-18 | End: 2022-06-23

## 2022-06-18 RX ADMIN — RABIES VACCINE 2.5 UNITS: KIT at 01:06

## 2022-06-18 RX ADMIN — MUPIROCIN 22 G: 20 OINTMENT TOPICAL at 01:06

## 2022-06-18 NOTE — ED PROVIDER NOTES
"Encounter Date: 2022       History     Chief Complaint   Patient presents with    vaccine     Pt reports to ED with request of " 3rd vaccine shot " for rabies     Chief Complaint:  Repeat rabies vaccine  History of  Present Illness: History obtained from patient. This 72 y.o. female presents to the ED for repeat rabies vaccination.  Patient also requesting additional antibiotics for the wound to the right leg.  Patient reports improvement of wound since starting antibiotic therapy.  Denies fever.        Review of patient's allergies indicates:   Allergen Reactions    Codeine     Iodine and iodide containing products     Iodinated contrast media      Rash (skin)^  Rash (skin)^    Mobic [meloxicam]     Doxycycline Rash     Past Medical History:   Diagnosis Date    Abnormal EKG     Allergy     seasonal    Anxiety     Breast cyst     Cataract     Fibrocystic breast     GERD (gastroesophageal reflux disease)     History of colonic polyps     1 polyp  --5 yrs.    Hypertension     Hypothyroidism     IBS (irritable bowel syndrome)     Keloid cicatrix     Migraine syndrome     OA (osteoarthritis)     Osteopenia     BMD  --no tx -repeat 2 yrs    Osteoporosis, postmenopausal     Palpitations     SOB (shortness of breath)      Past Surgical History:   Procedure Laterality Date    APPENDECTOMY       SECTION      x2    HYSTERECTOMY  age 33    COLEEN/BSO for endometriosis    OOPHORECTOMY      TONSILLECTOMY       Family History   Problem Relation Age of Onset    Cataracts Mother     No Known Problems Father     Ovarian cancer Sister     No Known Problems Brother     No Known Problems Maternal Aunt     No Known Problems Maternal Uncle     No Known Problems Paternal Aunt     No Known Problems Paternal Uncle     No Known Problems Maternal Grandmother     No Known Problems Maternal Grandfather     No Known Problems Paternal Grandmother     No Known Problems Paternal " Grandfather     Colon cancer Neg Hx     Breast cancer Neg Hx     Amblyopia Neg Hx     Blindness Neg Hx     Cancer Neg Hx     Diabetes Neg Hx     Glaucoma Neg Hx     Hypertension Neg Hx     Macular degeneration Neg Hx     Retinal detachment Neg Hx     Strabismus Neg Hx     Stroke Neg Hx     Thyroid disease Neg Hx     Melanoma Neg Hx      Social History     Tobacco Use    Smoking status: Never Smoker    Smokeless tobacco: Never Used   Substance Use Topics    Alcohol use: Yes     Alcohol/week: 1.0 standard drink     Types: 1 Glasses of wine per week     Comment: ocasionally    Drug use: No     Review of Systems   Constitutional: Negative for fever.   Skin: Positive for wound.   Neurological: Negative for weakness and numbness.       Physical Exam     Initial Vitals [06/17/22 2236]   BP Pulse Resp Temp SpO2   (!) 114/56 65 20 97.6 °F (36.4 °C) 99 %      MAP       --         Physical Exam    Nursing note and vitals reviewed.  Constitutional: She appears well-developed and well-nourished. She is active.  Non-toxic appearance. She does not have a sickly appearance. She does not appear ill.   HENT:   Head: Normocephalic and atraumatic.   Nose: Nose normal.   Mouth/Throat: Uvula is midline, oropharynx is clear and moist and mucous membranes are normal.   Eyes: Conjunctivae, EOM and lids are normal. Pupils are equal, round, and reactive to light.   Neck: Neck supple.   Normal range of motion.   Full passive range of motion without pain.     Cardiovascular: Normal rate and regular rhythm.   Pulses:       Dorsalis pedis pulses are 2+ on the right side and 2+ on the left side.   Musculoskeletal:      Cervical back: Full passive range of motion without pain, normal range of motion and neck supple.     Neurological: She is alert and oriented to person, place, and time.   Skin: Skin is warm. Capillary refill takes less than 2 seconds.   There is a healing wound with 2 puncture marks to the right lower leg with  minimal surrounding erythema.  There appears to be scant purulent drainage.         ED Course   Procedures  Labs Reviewed - No data to display       Imaging Results    None          Medications   rabies vaccine, PCEC injection 2.5 Units (2.5 Units Intramuscular Given 6/18/22 0122)   mupirocin 2 % ointment 22 g (22 g Topical (Top) Given 6/18/22 0121)     Medical Decision Making:   ED Management:  This is an evaluation of a 72 y.o. female who presents to the ED for repeat rabies vaccination.  Vital signs are stable.   Afebrile.  Patient is nontoxic appearing and in no acute distress.     HPI for rabies. and  physical exam as above.  Will provide patient with additional Augmentin for wound to right lower leg.  Was struck patient return 1 week for final vaccination.  No indication for extensive workup here in the emergency department.    Patient given return precautions and instructed to return to the emergency department for any new or worsening symptoms. Patient verbalized understanding and agreed with plan. Encouraged follow-up with PCP.                        Clinical Impression:   Final diagnoses:  [Z23] Encounter for repeat administration of rabies vaccination (Primary)          ED Disposition Condition    Discharge Stable        ED Prescriptions     Medication Sig Dispense Start Date End Date Auth. Provider    amoxicillin-clavulanate 875-125mg (AUGMENTIN) 875-125 mg per tablet Take 1 tablet by mouth 2 (two) times daily. for 5 days 10 tablet 6/18/2022 6/23/2022 Reji Dao PA-C        Follow-up Information     Follow up With Specialties Details Why Contact Info    Julian Lee MD Internal Medicine   [address] [facility] [address] [phone]      [facility] Emergency Dept Emergency Medicine Go in 1 day If symptoms worsen [address]  [facility] [address] [phone]           Reji Dao PA-C  06/18/22 9160

## 2022-06-18 NOTE — FIRST PROVIDER EVALUATION
"Medical screening exam completed.  I have conducted a focused provider triage encounter, findings are as follows:    Brief history of present illness:  72 y.o. female presents to the ED for repeat rabies vaccination    Vitals:    06/17/22 2236   BP: (!) 114/56   BP Location: Right arm   Patient Position: Sitting   Pulse: 65   Resp: 20   Temp: 97.6 °F (36.4 °C)   TempSrc: Oral   SpO2: 99%   Weight: 59.4 kg (131 lb)   Height: 5' 1" (1.549 m)       Pertinent physical exam:  Patient is nontoxic appearing and in no acute distress.      Brief workup plan:  Rabies vaccine ordered    Preliminary workup initiated; this workup will be continued and followed by the physician or advanced practice provider that is assigned to the patient when roomed.  "

## 2022-06-24 PROCEDURE — 99900062 HC AFTER HR RABIES VACCINE ED REGISTRATION

## 2022-06-25 ENCOUNTER — HOSPITAL ENCOUNTER (EMERGENCY)
Facility: HOSPITAL | Age: 72
Discharge: HOME | End: 2022-06-25
Attending: EMERGENCY MEDICINE
Payer: MEDICARE

## 2022-06-25 VITALS
HEART RATE: 63 BPM | SYSTOLIC BLOOD PRESSURE: 108 MMHG | TEMPERATURE: 98 F | RESPIRATION RATE: 17 BRPM | DIASTOLIC BLOOD PRESSURE: 68 MMHG | OXYGEN SATURATION: 97 % | HEIGHT: 61 IN | WEIGHT: 132 LBS | BODY MASS INDEX: 24.92 KG/M2

## 2022-06-25 DIAGNOSIS — Z23 ENCOUNTER FOR REPEAT ADMINISTRATION OF RABIES VACCINATION: Primary | ICD-10-CM

## 2022-06-25 PROCEDURE — 90471 IMMUNIZATION ADMIN: CPT | Performed by: PHYSICIAN ASSISTANT

## 2022-06-25 PROCEDURE — 63600175 PHARM REV CODE 636 W HCPCS: Mod: JG | Performed by: PHYSICIAN ASSISTANT

## 2022-06-25 PROCEDURE — 90675 RABIES VACCINE IM: CPT | Mod: JG | Performed by: PHYSICIAN ASSISTANT

## 2022-06-25 RX ADMIN — Medication 2.5 UNITS: at 12:06

## 2022-06-25 NOTE — ED PROVIDER NOTES
Encounter Date: 2022       History     Chief Complaint   Patient presents with    Follow-up     Presents for 4th rabies injection as directed     Chief Complaint:  Rabies vaccination  History of  Present Illness: History obtained from patient. This 72 y.o. female presents to the ED for rabies vaccination.  Patient has no complaints at this time.          Review of patient's allergies indicates:   Allergen Reactions    Codeine     Iodine and iodide containing products     Iodinated contrast media      Rash (skin)^  Rash (skin)^    Mobic [meloxicam]     Doxycycline Rash     Past Medical History:   Diagnosis Date    Abnormal EKG     Allergy     seasonal    Anxiety     Breast cyst     Cataract     Fibrocystic breast     GERD (gastroesophageal reflux disease)     History of colonic polyps     1 polyp  --5 yrs.    Hypertension     Hypothyroidism     IBS (irritable bowel syndrome)     Keloid cicatrix     Migraine syndrome     OA (osteoarthritis)     Osteopenia     BMD  --no tx -repeat 2 yrs    Osteoporosis, postmenopausal     Palpitations     SOB (shortness of breath)      Past Surgical History:   Procedure Laterality Date    APPENDECTOMY       SECTION      x2    HYSTERECTOMY  age 33    COLEEN/BSO for endometriosis    OOPHORECTOMY      TONSILLECTOMY       Family History   Problem Relation Age of Onset    Cataracts Mother     No Known Problems Father     Ovarian cancer Sister     No Known Problems Brother     No Known Problems Maternal Aunt     No Known Problems Maternal Uncle     No Known Problems Paternal Aunt     No Known Problems Paternal Uncle     No Known Problems Maternal Grandmother     No Known Problems Maternal Grandfather     No Known Problems Paternal Grandmother     No Known Problems Paternal Grandfather     Colon cancer Neg Hx     Breast cancer Neg Hx     Amblyopia Neg Hx     Blindness Neg Hx     Cancer Neg Hx     Diabetes Neg Hx     Glaucoma  Neg Hx     Hypertension Neg Hx     Macular degeneration Neg Hx     Retinal detachment Neg Hx     Strabismus Neg Hx     Stroke Neg Hx     Thyroid disease Neg Hx     Melanoma Neg Hx      Social History     Tobacco Use    Smoking status: Never Smoker    Smokeless tobacco: Never Used   Substance Use Topics    Alcohol use: Yes     Alcohol/week: 1.0 standard drink     Types: 1 Glasses of wine per week     Comment: ocasionally    Drug use: No     Review of Systems   Constitutional: Negative for fever.   Gastrointestinal: Negative for nausea and vomiting.       Physical Exam     Initial Vitals [06/24/22 2358]   BP Pulse Resp Temp SpO2   108/68 63 17 97.7 °F (36.5 °C) 97 %      MAP       --         Physical Exam    Constitutional: She appears well-developed and well-nourished. She is active.  Non-toxic appearance. She does not have a sickly appearance. She does not appear ill.   HENT:   Head: Normocephalic and atraumatic.   Nose: Nose normal.   Mouth/Throat: Uvula is midline, oropharynx is clear and moist and mucous membranes are normal.   Eyes: Conjunctivae, EOM and lids are normal. Pupils are equal, round, and reactive to light.   Neck: Neck supple.   Normal range of motion.   Full passive range of motion without pain.     Cardiovascular: Normal rate and regular rhythm.   Musculoskeletal:      Cervical back: Full passive range of motion without pain, normal range of motion and neck supple.     Neurological: She is alert and oriented to person, place, and time.   Skin: Skin is warm. Capillary refill takes less than 2 seconds.   There is a healing wound to the right lower extremity without surrounding erythema, induration, fluctuance or drainage.         ED Course   Procedures  Labs Reviewed - No data to display       Imaging Results    None          Medications   rabies vaccine, PCEC injection 2.5 Units (has no administration in time range)     Medical Decision Making:   ED Management:  This is an evaluation of a  72 y.o. female who presents to the ED for repeat rabies vaccination.  Vital signs are stable.   Afebrile.  Patient is nontoxic appearing and in no acute distress.     Rabies vaccination administered.    Patient given return precautions and instructed to return to the emergency department for any new or worsening symptoms. Patient verbalized understanding and agreed with plan. Encouraged follow-up with PCP.                        Clinical Impression:   Final diagnoses:  [Z23] Encounter for repeat administration of rabies vaccination (Primary)          ED Disposition Condition    Discharge Stable        ED Prescriptions     None        Follow-up Information     Follow up With Specialties Details Why Contact Info    Julian Lee MD Internal Medicine   [address] [place] [id number]  [phone]      [facility] Emergency Dept Emergency Medicine Go in 1 day If symptoms worsen [address] [facility] [address] [phone]           Reji Dao PA-C  06/25/22 0024

## 2022-07-02 DIAGNOSIS — E03.9 HYPOTHYROIDISM, UNSPECIFIED TYPE: ICD-10-CM

## 2022-07-02 NOTE — TELEPHONE ENCOUNTER
No new care gaps identified.  [facility] Embedded Care Gaps. Reference number: [id number]. 7/02/2022   1:58:02 PM CDT

## 2022-07-04 NOTE — TELEPHONE ENCOUNTER
No new care gaps identified.  [facility] Embedded Care Gaps. Reference number: [id number]. 7/04/2022   12:37:33 AM CDT

## 2022-07-08 RX ORDER — OLMESARTAN MEDOXOMIL 40 MG/1
40 TABLET ORAL DAILY
Qty: 30 TABLET | Refills: 1 | Status: SHIPPED | OUTPATIENT
Start: 2022-07-08 | End: 2022-08-02

## 2022-07-11 NOTE — TELEPHONE ENCOUNTER
Left a voicemail message to inform the Patient, Rx refill has been approved and sent to preferred pharmacy.  Sent a message thru [facility] as well.

## 2022-07-13 RX ORDER — LEVOTHYROXINE SODIUM 88 UG/1
TABLET ORAL
Qty: 30 TABLET | Refills: 1 | Status: SHIPPED | OUTPATIENT
Start: 2022-07-13 | End: 2022-08-21

## 2022-07-13 RX ORDER — FLUTICASONE PROPIONATE 50 MCG
SPRAY, SUSPENSION (ML) NASAL
Qty: 48 ML | Refills: 12 | Status: SHIPPED | OUTPATIENT
Start: 2022-07-13

## 2022-08-02 RX ORDER — OLMESARTAN MEDOXOMIL 40 MG/1
TABLET ORAL
Qty: 30 TABLET | Refills: 0 | Status: SHIPPED | OUTPATIENT
Start: 2022-08-02 | End: 2022-08-30

## 2022-08-02 NOTE — TELEPHONE ENCOUNTER
Refill Routing Note   Medication(s) are not appropriate for processing by [facility] for the following reason(s):      - Patient has not been seen in over 15 months by PCP  - Required laboratory values are outdated    [facility] action(s):  Defer          Medication reconciliation completed: No     Appointments  past 12m or future 3m with PCP    Date Provider   Last Visit   10/15/2020 Julian Lee MD   Next Visit   Visit date not found Julian Lee MD   ED visits in past 90 days: 4        Note composed:11:44 AM 08/02/2022

## 2022-08-02 NOTE — TELEPHONE ENCOUNTER
No new care gaps identified.  [facility] Embedded Care Gaps. Reference number: [id number]. 8/02/2022   10:32:39 AM BENITAT

## 2022-09-01 ENCOUNTER — PATIENT MESSAGE (OUTPATIENT)
Dept: FAMILY MEDICINE | Facility: CLINIC | Age: 72
End: 2022-09-01
Payer: MEDICARE

## 2022-09-01 NOTE — TELEPHONE ENCOUNTER
No new care gaps identified.  [facility] Embedded Care Gaps. Reference number: [id number]. 9/01/2022   2:13:18 PM CDT

## 2022-09-05 RX ORDER — BUTALBITAL, ACETAMINOPHEN AND CAFFEINE 50; 325; 40 MG/1; MG/1; MG/1
1 TABLET ORAL EVERY 6 HOURS PRN
Qty: 30 TABLET | Refills: 0 | Status: SHIPPED | OUTPATIENT
Start: 2022-09-05 | End: 2022-09-06 | Stop reason: SDUPTHER

## 2022-09-06 ENCOUNTER — OFFICE VISIT (OUTPATIENT)
Dept: FAMILY MEDICINE | Facility: CLINIC | Age: 72
End: 2022-09-06
Payer: MEDICARE

## 2022-09-06 ENCOUNTER — TELEPHONE (OUTPATIENT)
Dept: FAMILY MEDICINE | Facility: CLINIC | Age: 72
End: 2022-09-06

## 2022-09-06 ENCOUNTER — TELEPHONE (OUTPATIENT)
Dept: FAMILY MEDICINE | Facility: CLINIC | Age: 72
End: 2022-09-06
Payer: MEDICARE

## 2022-09-06 VITALS
DIASTOLIC BLOOD PRESSURE: 60 MMHG | SYSTOLIC BLOOD PRESSURE: 122 MMHG | HEIGHT: 61 IN | OXYGEN SATURATION: 98 % | BODY MASS INDEX: 25.18 KG/M2 | TEMPERATURE: 98 F | HEART RATE: 61 BPM | WEIGHT: 133.38 LBS

## 2022-09-06 DIAGNOSIS — R42 VERTIGO: ICD-10-CM

## 2022-09-06 DIAGNOSIS — M85.80 OSTEOPENIA, UNSPECIFIED LOCATION: ICD-10-CM

## 2022-09-06 DIAGNOSIS — Z00.00 ROUTINE MEDICAL EXAM: Primary | ICD-10-CM

## 2022-09-06 DIAGNOSIS — K59.09 CHRONIC CONSTIPATION: ICD-10-CM

## 2022-09-06 DIAGNOSIS — I10 ESSENTIAL HYPERTENSION: ICD-10-CM

## 2022-09-06 DIAGNOSIS — Z86.010 HISTORY OF COLONIC POLYPS: ICD-10-CM

## 2022-09-06 DIAGNOSIS — G43.009 MIGRAINE WITHOUT AURA AND WITHOUT STATUS MIGRAINOSUS, NOT INTRACTABLE: ICD-10-CM

## 2022-09-06 DIAGNOSIS — E03.9 HYPOTHYROIDISM, UNSPECIFIED TYPE: ICD-10-CM

## 2022-09-06 DIAGNOSIS — Z12.31 ENCOUNTER FOR SCREENING MAMMOGRAM FOR BREAST CANCER: ICD-10-CM

## 2022-09-06 DIAGNOSIS — K58.1 IRRITABLE BOWEL SYNDROME WITH CONSTIPATION: ICD-10-CM

## 2022-09-06 DIAGNOSIS — E55.9 VITAMIN D DEFICIENCY DISEASE: ICD-10-CM

## 2022-09-06 DIAGNOSIS — H66.90 EAR INFECTION: ICD-10-CM

## 2022-09-06 DIAGNOSIS — D64.9 ANEMIA, UNSPECIFIED TYPE: ICD-10-CM

## 2022-09-06 PROCEDURE — 3074F SYST BP LT 130 MM HG: CPT | Mod: CPTII,S$GLB,, | Performed by: INTERNAL MEDICINE

## 2022-09-06 PROCEDURE — 99397 PER PM REEVAL EST PAT 65+ YR: CPT | Mod: S$GLB,,, | Performed by: INTERNAL MEDICINE

## 2022-09-06 PROCEDURE — 4010F PR ACE/ARB THEARPY RXD/TAKEN: ICD-10-PCS | Mod: CPTII,S$GLB,, | Performed by: INTERNAL MEDICINE

## 2022-09-06 PROCEDURE — 3008F PR BODY MASS INDEX (BMI) DOCUMENTED: ICD-10-PCS | Mod: CPTII,S$GLB,, | Performed by: INTERNAL MEDICINE

## 2022-09-06 PROCEDURE — 3288F PR FALLS RISK ASSESSMENT DOCUMENTED: ICD-10-PCS | Mod: CPTII,S$GLB,, | Performed by: INTERNAL MEDICINE

## 2022-09-06 PROCEDURE — 3078F PR MOST RECENT DIASTOLIC BLOOD PRESSURE < 80 MM HG: ICD-10-PCS | Mod: CPTII,S$GLB,, | Performed by: INTERNAL MEDICINE

## 2022-09-06 PROCEDURE — 99215 OFFICE O/P EST HI 40 MIN: CPT | Mod: 25,S$GLB,, | Performed by: INTERNAL MEDICINE

## 2022-09-06 PROCEDURE — 3074F PR MOST RECENT SYSTOLIC BLOOD PRESSURE < 130 MM HG: ICD-10-PCS | Mod: CPTII,S$GLB,, | Performed by: INTERNAL MEDICINE

## 2022-09-06 PROCEDURE — 99999 PR PBB SHADOW E&M-EST. PATIENT-LVL IV: CPT | Mod: PBBFAC,,, | Performed by: INTERNAL MEDICINE

## 2022-09-06 PROCEDURE — 1159F PR MEDICATION LIST DOCUMENTED IN MEDICAL RECORD: ICD-10-PCS | Mod: CPTII,S$GLB,, | Performed by: INTERNAL MEDICINE

## 2022-09-06 PROCEDURE — 4010F ACE/ARB THERAPY RXD/TAKEN: CPT | Mod: CPTII,S$GLB,, | Performed by: INTERNAL MEDICINE

## 2022-09-06 PROCEDURE — 99397 PR PREVENTIVE VISIT,EST,65 & OVER: ICD-10-PCS | Mod: S$GLB,,, | Performed by: INTERNAL MEDICINE

## 2022-09-06 PROCEDURE — 99999 PR PBB SHADOW E&M-EST. PATIENT-LVL IV: ICD-10-PCS | Mod: PBBFAC,,, | Performed by: INTERNAL MEDICINE

## 2022-09-06 PROCEDURE — 3288F FALL RISK ASSESSMENT DOCD: CPT | Mod: CPTII,S$GLB,, | Performed by: INTERNAL MEDICINE

## 2022-09-06 PROCEDURE — 99215 PR OFFICE/OUTPT VISIT, EST, LEVL V, 40-54 MIN: ICD-10-PCS | Mod: 25,S$GLB,, | Performed by: INTERNAL MEDICINE

## 2022-09-06 PROCEDURE — 1101F PT FALLS ASSESS-DOCD LE1/YR: CPT | Mod: CPTII,S$GLB,, | Performed by: INTERNAL MEDICINE

## 2022-09-06 PROCEDURE — 1159F MED LIST DOCD IN RCRD: CPT | Mod: CPTII,S$GLB,, | Performed by: INTERNAL MEDICINE

## 2022-09-06 PROCEDURE — 3078F DIAST BP <80 MM HG: CPT | Mod: CPTII,S$GLB,, | Performed by: INTERNAL MEDICINE

## 2022-09-06 PROCEDURE — 1101F PR PT FALLS ASSESS DOC 0-1 FALLS W/OUT INJ PAST YR: ICD-10-PCS | Mod: CPTII,S$GLB,, | Performed by: INTERNAL MEDICINE

## 2022-09-06 PROCEDURE — 3008F BODY MASS INDEX DOCD: CPT | Mod: CPTII,S$GLB,, | Performed by: INTERNAL MEDICINE

## 2022-09-06 RX ORDER — AMLODIPINE BESYLATE 5 MG/1
5 TABLET ORAL DAILY
Qty: 90 TABLET | Refills: 11 | Status: SHIPPED | OUTPATIENT
Start: 2022-09-06 | End: 2023-10-19 | Stop reason: SDUPTHER

## 2022-09-06 RX ORDER — HYDROCHLOROTHIAZIDE 25 MG/1
25 TABLET ORAL DAILY
Qty: 90 TABLET | Refills: 12 | Status: SHIPPED | OUTPATIENT
Start: 2022-09-06 | End: 2023-10-19 | Stop reason: SDUPTHER

## 2022-09-06 RX ORDER — OLMESARTAN MEDOXOMIL 40 MG/1
40 TABLET ORAL DAILY
Qty: 90 TABLET | Refills: 12 | Status: SHIPPED | OUTPATIENT
Start: 2022-09-06 | End: 2023-11-16 | Stop reason: SDUPTHER

## 2022-09-06 RX ORDER — BUTALBITAL, ACETAMINOPHEN AND CAFFEINE 50; 325; 40 MG/1; MG/1; MG/1
1 TABLET ORAL EVERY 4 HOURS PRN
Qty: 30 TABLET | Refills: 3 | Status: SHIPPED | OUTPATIENT
Start: 2022-09-06 | End: 2023-08-07 | Stop reason: SDUPTHER

## 2022-09-06 RX ORDER — MECLIZINE HYDROCHLORIDE 25 MG/1
25 TABLET ORAL 3 TIMES DAILY PRN
Qty: 30 TABLET | Refills: 12 | Status: SHIPPED | OUTPATIENT
Start: 2022-09-06

## 2022-09-06 RX ORDER — ONDANSETRON 4 MG/1
4 TABLET, FILM COATED ORAL EVERY 6 HOURS PRN
Qty: 30 TABLET | Refills: 6 | Status: SHIPPED | OUTPATIENT
Start: 2022-09-06

## 2022-09-06 RX ORDER — CARVEDILOL 25 MG/1
TABLET ORAL
Qty: 90 TABLET | Refills: 11 | Status: SHIPPED | OUTPATIENT
Start: 2022-09-06 | End: 2023-09-01 | Stop reason: SDUPTHER

## 2022-09-06 RX ORDER — SUMATRIPTAN SUCCINATE 25 MG/1
25 TABLET ORAL
Qty: 9 TABLET | Refills: 12 | Status: SHIPPED | OUTPATIENT
Start: 2022-09-06 | End: 2023-12-06

## 2022-09-06 RX ORDER — FAMOTIDINE 20 MG/1
20 TABLET, FILM COATED ORAL 2 TIMES DAILY
Qty: 60 TABLET | Refills: 11 | Status: SHIPPED | OUTPATIENT
Start: 2022-09-06 | End: 2023-12-06

## 2022-09-06 NOTE — TELEPHONE ENCOUNTER
Pt stated rx had not been sent to pharmacy. I called pharmacy to confirm rx had actually been sent in.

## 2022-09-06 NOTE — TELEPHONE ENCOUNTER
----- Message from Jared Dodge sent at 9/6/2022  4:22 PM CDT -----      Name of Who is Calling: MACK CLARK [[id number]]      What is the request in detail: [facility] called regarding script butalbital-acetaminophen-caffeine -40 mg (FIORICET, ESGIC) -40 mg per tablet.Please contact to further discuss and advise.          Can the clinic reply by ALLANER: N      What Number to Call Back if not in [facility]: [facility]/PHARMACY #[id number] - [place] - [address]

## 2022-09-06 NOTE — PROGRESS NOTES
Chief complaint, physical exam,    72-year-old white female who admittedly does not like going to the doctor. Last seen 2018 then .  She is here for her physical.  Regarding health maintenance it's been about 1 year since she had lab work.   mammogram.  She is overdue on her colonoscopy -due .  She would like to get it done at the [facility] but okay with the [facility] since it is closer to her house.  Due to general orthopedic issues she has been exercising less and think she is getting deconditioned.        ROS:   CONST: weight stable. EYES: no vision change. ENT: no sore throat. CV: no chest pain w/ exertion. RESP:No orthopnea PND or cough. GI: no nausea, vomiting, diarrhea. No dysphagia. : no urinary issues. MUSCULOSKELETAL: no new myalgias or arthralgias except for some issues with the left shoulder blade and left medial elbow SKIN: no new changes. NEURO: no focal deficits. PSYCH: no new issues. ENDOCRINE: no polyuria. HEME: no lymph nodes. ALLERGY: no general pruritis.         PAST MEDICAL HISTORY:    1. Endometriosis,   2. Hypertension -was followed by Nephrology in the past, Onset age 24          3. Osteoarthritis,Hands back and neck  4. Migraine.  5. IBS with constipation- was seen by [facility]  6. H/o palpitations- neg w/u by Cards  7. Anxiety with h/o panic attacks  8. GERD - Esophagitis/gastritis by EGD , hiatal hernia on EGD     9. HYPOthyroidism -saw Endo in the past.  10. Osteoporosis by BMD  by Dr Garcia. Osteopenia , intolerant to Fosamax orally in the past  11. Colonoscopy with one polyp , 5 years  12.  Left medial epicondylitis                                                                                                                         PAST SURGICAL HISTORY:  Hysterectomy, a , surgeries for             endometriosis, and tonsillectomy and appendectomy.                                                                                                         SOCIAL HISTORY:  No tobacco use.  Drinks 2-3 glasses of wine every day or    every other day.      Vital signs as above  Gen: no distress  EYES: conjunctiva clear, non-icteric, PERRL  ENT: nose clear, nasal mucosa normal, oropharynx clear and moist, teeth good, right ear canal is nontender and I can wiggle year there is no discharge.  She has a narrow ear canal think I cannot see the tympanic membrane due to lack which she says she has had deeper before and had it removed by ENT before.  The left ear canals also similarly slightly now but less so and I can see the tympanic membrane.  NECK:supple, thyroid non-palpable  RESP: effort is good, lungs clear  CV: heart RRR w/o murmur, gallops or rubs; no carotid bruits, no edema  GI: abdomen soft, non-distended, non-tender, no hepatosplenomegaly  MS: gait normal, no clubbing or cyanosis of the digits,  Skin no rashes, warm to touch    Aleksandra was seen today for annual exam.    Diagnoses and all orders for this visit:    Routine medical exam, overdue for colonoscopy, due mammogram, discussed vaccines at length, she is hesitant and discussed the new upcoming COVID booster which I would encourage her to take.      -     Comprehensive Metabolic Panel; Future  -     Vitamin D; Future  -     TSH; Future  -     Lipid Panel; Future  -     CBC Auto Differential; Future    Encounter for screening mammogram for breast cancer, overdue  -     Mammo Digital Screening Bilat; Future    History of colonic polyps, very overdue, discussed scope and prep issues  -     Ambulatory referral/consult to Endo Procedure ; Future                                        Additional evaluation and management issues:    Additionally, patient has numerous other medical issues and complaints to address today separate from her physical.  Patient never did take the Boniva.  She did see local Endocrine [facility] encouraged her to take it again looking for GI side effects that she had on daily  Fosamax.  Hold off on getting  density.  If indeed she tries parental therapy am sure will get a baseline prior to treatment.    Also recently about three weeks ago went to an ENT or urgent care.  She was told the right ear was swollen.  It was painful.  At the same time she had onset of some vertigo about a week later she started having a daily headache a week after year symptoms started.  She was given some Cipro and steroid drops and that Cipro pills for five days which was then switched to Augmentin.  It upsets her stomach so she just takes a half a pill at a time.  Infection symptom seems to be resolving.  Today her ear canal is narrow but I really do not suspect any ongoing otitis externa with no pain or discharge and is not quite clear she had that before.  It is possible that she better pre-existing narrow ear canal but at some point when she went back a 2nd time apparently she was told there is fluid behind her eardrum and that may be when she was given some antibiotics.  Next pressure does get some positional dizziness and needs a refill of meclizine which she takes 12.5     Just has history of headaches.  There daily.  She does not have him all the time.  It looks like she would intermittently get some tension-type headaches with migraine features.  Her neck muscles do hurt and she sometimes has to assist lifting her head up further probably is a muscle component and the headache does go from back to front.  It then also becomes a bitemporal throbbing headache with photophobia and nausea to the point of vomiting.  I reviewed the prior neurology notes and she did respond to Imitrex 25 mg in the past so will prescribe that again along with the Fioricet which she does respond to as well and takes it rarely.  I think she has a component of migraine with possible vertigo association and definitely a tension-type headache it could be the tension type headache that is triggering her migraines.  We discussed  application of heat.  She does not like to take medications.  She was prescribed Robaxin in the past but may be never took it.  Her son has some at home and he does well with it and encouraged her to possibly even take one at night and assess if she gets a little bit of improvement and neck muscle stiffness in the morning and then to apply heat.    She does have history of gastritis but no current symptoms and no symptoms which would suggest the need for an EGD with the upcoming colonoscopy we discussed that.  Her GI symptoms and reflux come and go.  She was on Prilosec we discussed potential long-term issues and would be better to control it with lifestyle modification and Pepcid 20 mg twice a day so will send that to the pharmacy.    She has some varicose veins enlargement in the left leg.  We did refer to vascular and apparently she did have ultrasound and varicose veins but apparently her insurance did not cover treatment unless she had ulcers to the lower extremities.  She says the varicose veins or bigger and I would encourage her to make follow-up with vascular so documentation may eventually lead to coverage.  She needs to update all her labs which we reviewed.  We had a long conversation regarding all these issues. Over 70 min  minutes of total time spent on the encounter, which includes face to face time and non-face to face time preparing to see the patient (eg, review of tests), Obtaining and/or reviewing separately obtained history, Documenting clinical information in the electronic or other health record, Independently interpreting results (not separately reported) and communicating results to the patient/family/caregiver, or Care coordination (not separately reported).      All these various issues reviewed and patient counseled and her evaluation and management today will be based upon time MDM Kalyani time spent with patient.          Assessment and plan:          Hypothyroidism, unspecified type,  overdue for assessment  -     Comprehensive Metabolic Panel; Future  -     Vitamin D; Future  -     TSH; Future  -     Lipid Panel; Future  -     CBC Auto Differential; Future    Vitamin D deficiency disease, reassess  -     Vitamin D; Future    Essential hypertension, chronic and stable, check labs, looks like she is only on carvedilol 25 mg once a day and not the twice a day prescribe some made the adjustment.  She is on four medications.  Sometimes she says it is a little bit elevated in the morning if she discovered that she may need to move one of her morning pills to the evening such as the amlodipine    Anemia, unspecified type  -     CBC Auto Differential; Future    Irritable bowel syndrome with constipation, chronic and stable    Osteopenia, unspecified location    Migraine without aura and without status migrainosus, not intractable, combination of tension headache and clear migraine features.  I think she has both issues.  She needs to probably address the neck issue with heat, stretching, possible muscle relaxers and will restart the Fioricet Imitrex and she responded to and tolerated in the past.    Chronic constipation        Vertigo, recurrence, may be associated with the headache issue    Ear infection, she may just have narrow ear canals with history of cerumen [place] and I recommend she actually follow-up with an actual ENT to assess and possibly use of microscope to assess and possibly remove the wax.  Unclear if recently she had an infection but looks like she was adequately treated with drops and oral antibiotics and I do not think she needs to restart any Ciprodex drops in the ear as there is really no clinical signs of otitis externa.    Other orders  -     butalbital-acetaminophen-caffeine -40 mg (FIORICET, ESGIC) -40 mg per tablet; Take 1 tablet by mouth every 4 (four) hours as needed for Headaches.  -     sumatriptan (IMITREX) 25 MG Tab; Take 1 tablet (25 mg total) by mouth every  2 (two) hours as needed (repeat once in 2 hrs).  -     famotidine (PEPCID) 20 MG tablet; Take 1 tablet (20 mg total) by mouth 2 (two) times daily.  -     meclizine (ANTIVERT) 25 mg tablet; Take 1 tablet (25 mg total) by mouth 3 (three) times daily as needed for Dizziness (or at least one HS for 1 week when vertigo active).  -     ondansetron (ZOFRAN) 4 MG tablet; Take 1 tablet (4 mg total) by mouth every 6 (six) hours as needed for Nausea.  -     olmesartan (BENICAR) 40 MG tablet; Take 1 tablet (40 mg total) by mouth once daily.  -     hydroCHLOROthiazide (HYDRODIURIL) 25 MG tablet; Take 1 tablet (25 mg total) by mouth once daily.  -     carvediloL (COREG) 25 MG tablet; 1 daily  -     amLODIPine (NORVASC) 5 MG tablet; Take 1 tablet (5 mg total) by mouth once daily.

## 2022-09-07 DIAGNOSIS — Z78.0 MENOPAUSE: ICD-10-CM

## 2022-09-08 ENCOUNTER — LAB VISIT (OUTPATIENT)
Dept: LAB | Facility: HOSPITAL | Age: 72
End: 2022-09-08
Attending: INTERNAL MEDICINE
Payer: MEDICARE

## 2022-09-08 DIAGNOSIS — E55.9 VITAMIN D DEFICIENCY DISEASE: ICD-10-CM

## 2022-09-08 DIAGNOSIS — E03.9 HYPOTHYROIDISM, UNSPECIFIED TYPE: ICD-10-CM

## 2022-09-08 DIAGNOSIS — D64.9 ANEMIA, UNSPECIFIED TYPE: ICD-10-CM

## 2022-09-08 DIAGNOSIS — Z00.00 ROUTINE MEDICAL EXAM: ICD-10-CM

## 2022-09-08 LAB
25(OH)D3+25(OH)D2 SERPL-MCNC: 25 NG/ML (ref 30–96)
ALBUMIN SERPL BCP-MCNC: 3.5 G/DL (ref 3.5–5.2)
ALP SERPL-CCNC: 38 U/L (ref 55–135)
ALT SERPL W/O P-5'-P-CCNC: 11 U/L (ref 10–44)
ANION GAP SERPL CALC-SCNC: 9 MMOL/L (ref 8–16)
AST SERPL-CCNC: 13 U/L (ref 10–40)
BASOPHILS # BLD AUTO: 0.06 K/UL (ref 0–0.2)
BASOPHILS NFR BLD: 0.9 % (ref 0–1.9)
BILIRUB SERPL-MCNC: 0.4 MG/DL (ref 0.1–1)
BUN SERPL-MCNC: 27 MG/DL (ref 8–23)
CALCIUM SERPL-MCNC: 9.3 MG/DL (ref 8.7–10.5)
CHLORIDE SERPL-SCNC: 102 MMOL/L (ref 95–110)
CHOLEST SERPL-MCNC: 255 MG/DL (ref 120–199)
CHOLEST/HDLC SERPL: 2.1 {RATIO} (ref 2–5)
CO2 SERPL-SCNC: 27 MMOL/L (ref 23–29)
CREAT SERPL-MCNC: 0.7 MG/DL (ref 0.5–1.4)
DIFFERENTIAL METHOD: ABNORMAL
EOSINOPHIL # BLD AUTO: 0.3 K/UL (ref 0–0.5)
EOSINOPHIL NFR BLD: 4.7 % (ref 0–8)
ERYTHROCYTE [DISTWIDTH] IN BLOOD BY AUTOMATED COUNT: 13.1 % (ref 11.5–14.5)
EST. GFR  (NO RACE VARIABLE): >60 ML/MIN/1.73 M^2
GLUCOSE SERPL-MCNC: 88 MG/DL (ref 70–110)
HCT VFR BLD AUTO: 37.3 % (ref 37–48.5)
HDLC SERPL-MCNC: 119 MG/DL (ref 40–75)
HDLC SERPL: 46.7 % (ref 20–50)
HGB BLD-MCNC: 12.1 G/DL (ref 12–16)
IMM GRANULOCYTES # BLD AUTO: 0.01 K/UL (ref 0–0.04)
IMM GRANULOCYTES NFR BLD AUTO: 0.1 % (ref 0–0.5)
LDLC SERPL CALC-MCNC: 122.6 MG/DL (ref 63–159)
LYMPHOCYTES # BLD AUTO: 2.3 K/UL (ref 1–4.8)
LYMPHOCYTES NFR BLD: 32.8 % (ref 18–48)
MCH RBC QN AUTO: 31.9 PG (ref 27–31)
MCHC RBC AUTO-ENTMCNC: 32.4 G/DL (ref 32–36)
MCV RBC AUTO: 98 FL (ref 82–98)
MONOCYTES # BLD AUTO: 0.5 K/UL (ref 0.3–1)
MONOCYTES NFR BLD: 7.9 % (ref 4–15)
NEUTROPHILS # BLD AUTO: 3.7 K/UL (ref 1.8–7.7)
NEUTROPHILS NFR BLD: 53.6 % (ref 38–73)
NONHDLC SERPL-MCNC: 136 MG/DL
NRBC BLD-RTO: 0 /100 WBC
PLATELET # BLD AUTO: 270 K/UL (ref 150–450)
PMV BLD AUTO: 10.7 FL (ref 9.2–12.9)
POTASSIUM SERPL-SCNC: 3.8 MMOL/L (ref 3.5–5.1)
PROT SERPL-MCNC: 6.8 G/DL (ref 6–8.4)
RBC # BLD AUTO: 3.79 M/UL (ref 4–5.4)
SODIUM SERPL-SCNC: 138 MMOL/L (ref 136–145)
T4 FREE SERPL-MCNC: 1.13 NG/DL (ref 0.71–1.51)
TRIGL SERPL-MCNC: 67 MG/DL (ref 30–150)
TSH SERPL DL<=0.005 MIU/L-ACNC: 0.22 UIU/ML (ref 0.4–4)
WBC # BLD AUTO: 6.87 K/UL (ref 3.9–12.7)

## 2022-09-08 PROCEDURE — 80053 COMPREHEN METABOLIC PANEL: CPT | Performed by: INTERNAL MEDICINE

## 2022-09-08 PROCEDURE — 82306 VITAMIN D 25 HYDROXY: CPT | Performed by: INTERNAL MEDICINE

## 2022-09-08 PROCEDURE — 84443 ASSAY THYROID STIM HORMONE: CPT | Performed by: INTERNAL MEDICINE

## 2022-09-08 PROCEDURE — 85025 COMPLETE CBC W/AUTO DIFF WBC: CPT | Performed by: INTERNAL MEDICINE

## 2022-09-08 PROCEDURE — 36415 COLL VENOUS BLD VENIPUNCTURE: CPT | Mod: PO | Performed by: INTERNAL MEDICINE

## 2022-09-08 PROCEDURE — 80061 LIPID PANEL: CPT | Performed by: INTERNAL MEDICINE

## 2022-09-08 PROCEDURE — 84439 ASSAY OF FREE THYROXINE: CPT | Performed by: INTERNAL MEDICINE

## 2022-10-05 DIAGNOSIS — Z78.0 MENOPAUSE: ICD-10-CM

## 2022-10-10 ENCOUNTER — PATIENT MESSAGE (OUTPATIENT)
Dept: ADMINISTRATIVE | Facility: HOSPITAL | Age: 72
End: 2022-10-10
Payer: MEDICARE

## 2023-01-09 ENCOUNTER — PATIENT MESSAGE (OUTPATIENT)
Dept: ADMINISTRATIVE | Facility: HOSPITAL | Age: 73
End: 2023-01-09
Payer: MEDICARE

## 2023-02-07 DIAGNOSIS — Z00.00 ENCOUNTER FOR MEDICARE ANNUAL WELLNESS EXAM: ICD-10-CM

## 2023-02-09 DIAGNOSIS — Z00.00 ENCOUNTER FOR MEDICARE ANNUAL WELLNESS EXAM: ICD-10-CM

## 2023-04-24 ENCOUNTER — HOSPITAL ENCOUNTER (EMERGENCY)
Facility: HOSPITAL | Age: 73
Discharge: HOME | End: 2023-04-24
Attending: EMERGENCY MEDICINE
Payer: MEDICARE

## 2023-04-24 VITALS
RESPIRATION RATE: 18 BRPM | HEART RATE: 74 BPM | HEIGHT: 61 IN | OXYGEN SATURATION: 100 % | WEIGHT: 133 LBS | SYSTOLIC BLOOD PRESSURE: 172 MMHG | BODY MASS INDEX: 25.11 KG/M2 | DIASTOLIC BLOOD PRESSURE: 73 MMHG | TEMPERATURE: 98 F

## 2023-04-24 DIAGNOSIS — L70.0 OPEN COMEDONE: Primary | ICD-10-CM

## 2023-04-24 PROCEDURE — 99283 EMERGENCY DEPT VISIT LOW MDM: CPT | Mod: HCNC,ER

## 2023-04-24 RX ORDER — CLINDAMYCIN HYDROCHLORIDE 150 MG/1
150 CAPSULE ORAL 4 TIMES DAILY
Qty: 28 CAPSULE | Refills: 0 | Status: SHIPPED | OUTPATIENT
Start: 2023-04-24 | End: 2023-05-01

## 2023-04-25 NOTE — ED PROVIDER NOTES
Encounter Date: 2023    SCRIBE #1 NOTE: I, Fatemeh Prieto, am scribing for, and in the presence of,  Ilya Rousseau MD. I have scribed the following portions of the note - Other sections scribed: HPI, ROS, PE.     History     Chief Complaint   Patient presents with    Oral Swelling     PT C/O PROGRESSIVE SWELLING STARTING W/ PIMPLE TO UPPER LIP AND NOW SWELLING TO L LOWER JAW AND THROAT. STATES THAT SHE HAS SORENESS W/ SWALLOWING     This is a 72 y.o. female with HTN and others who presents to the ED for oral swelling onset 3 days ago. Pt reports she has a large red bump on her left upper lip that she believes in infected and is causing the left side of her face to swell.  She reports soreness with swallowing.     The history is provided by the patient. No  was used.   Review of patient's allergies indicates:   Allergen Reactions    Codeine     Iodine and iodide containing products     Iodinated contrast media      Rash (skin)^  Rash (skin)^    Mobic [meloxicam]     Doxycycline Rash     Past Medical History:   Diagnosis Date    Abnormal EKG     Allergy     seasonal    Anxiety     Breast cyst     Cataract     Fibrocystic breast     GERD (gastroesophageal reflux disease)     History of colonic polyps     1 polyp  --5 yrs.    Hypertension     Hypothyroidism     IBS (irritable bowel syndrome)     Keloid cicatrix     Migraine syndrome     OA (osteoarthritis)     Osteopenia     BMD  --no tx -repeat 2 yrs    Osteoporosis, postmenopausal     Palpitations     SOB (shortness of breath)      Past Surgical History:   Procedure Laterality Date    APPENDECTOMY       SECTION      x2    HYSTERECTOMY  age 33    COLEEN/BSO for endometriosis    OOPHORECTOMY      TONSILLECTOMY       Family History   Problem Relation Age of Onset    Cataracts Mother     No Known Problems Father     Ovarian cancer Sister     No Known Problems Brother     No Known Problems Maternal Aunt     No Known Problems  Maternal Uncle     No Known Problems Paternal Aunt     No Known Problems Paternal Uncle     No Known Problems Maternal Grandmother     No Known Problems Maternal Grandfather     No Known Problems Paternal Grandmother     No Known Problems Paternal Grandfather     Colon cancer Neg Hx     Breast cancer Neg Hx     Amblyopia Neg Hx     Blindness Neg Hx     Cancer Neg Hx     Diabetes Neg Hx     Glaucoma Neg Hx     Hypertension Neg Hx     Macular degeneration Neg Hx     Retinal detachment Neg Hx     Strabismus Neg Hx     Stroke Neg Hx     Thyroid disease Neg Hx     Melanoma Neg Hx      Social History     Tobacco Use    Smoking status: Never    Smokeless tobacco: Never   Substance Use Topics    Alcohol use: Yes     Alcohol/week: 1.0 standard drink     Types: 1 Glasses of wine per week     Comment: ocasionally    Drug use: No     Review of Systems   Constitutional: Negative.  Negative for fever.   HENT:  Positive for facial swelling and trouble swallowing. Negative for sore throat.    Eyes: Negative.    Respiratory: Negative.  Negative for shortness of breath.    Cardiovascular: Negative.  Negative for chest pain.   Gastrointestinal: Negative.  Negative for nausea and vomiting.   Endocrine: Negative.    Genitourinary: Negative.  Negative for dysuria.   Musculoskeletal: Negative.  Negative for myalgias.   Skin: Negative.  Negative for rash.        (+) bump     Allergic/Immunologic: Negative.    Neurological: Negative.  Negative for headaches.   Hematological: Negative.  Negative for adenopathy.   Psychiatric/Behavioral: Negative.  Negative for behavioral problems.    All other systems reviewed and are negative.    Physical Exam     Initial Vitals [04/24/23 2229]   BP Pulse Resp Temp SpO2   (!) 173/96 76 14 98.5 °F (36.9 °C) 98 %      MAP       --         Physical Exam    Nursing note and vitals reviewed.  Constitutional: She appears well-developed and well-nourished.   HENT:   Head: Normocephalic and atraumatic.    Mouth/Throat: No trismus in the jaw.   Open comedone on left upper lip with swelling at left labial oral border.   Eyes: Conjunctivae and EOM are normal. Pupils are equal, round, and reactive to light.   Neck: Neck supple. No thyroid mass present.   Cardiovascular:  Normal rate, regular rhythm, S1 normal, S2 normal, normal heart sounds and intact distal pulses.           Pulmonary/Chest: Effort normal and breath sounds normal. No respiratory distress.   Abdominal: Abdomen is soft. Bowel sounds are normal.   Musculoskeletal:         General: No tenderness. Normal range of motion.      Cervical back: Neck supple.     Lymphadenopathy:     She has no axillary adenopathy.   Neurological: She is alert and oriented to person, place, and time. GCS eye subscore is 4. GCS verbal subscore is 5. GCS motor subscore is 6.   Skin: Skin is warm, dry and intact.   Psychiatric: She has a normal mood and affect. Her speech is normal. Judgment normal. Cognition and memory are normal.       ED Course   Procedures  Labs Reviewed - No data to display       Imaging Results    None          Medications - No data to display  Medical Decision Making:   History:   Old Medical Records: I decided to obtain old medical records.  ED Management:  Patient presents with an open comedone on the upper left lip with no buccal mucosal involvement.  Patient has no signs or symptoms concerning for upper airway involvement.        Scribe Attestation:   Scribe #1: I performed the above scribed service and the documentation accurately describes the services I performed. I attest to the accuracy of the note.                 This document was produced by a scribe under my direction and in my presence. I agree with the content of the note and have made any necessary edits.     Ilya Rousseau MD    04/25/2023 6:04 AM    Clinical Impression:   Final diagnoses:  [L70.0] Open comedone (Primary)        ED Disposition Condition    Discharge Stable          ED  Prescriptions       Medication Sig Dispense Start Date End Date Auth. Provider    clindamycin (CLEOCIN) 150 MG capsule Take 1 capsule (150 mg total) by mouth 4 (four) times daily. for 7 days 28 capsule 4/24/2023 5/1/2023 Ilya Rousseau MD          Follow-up Information       Follow up With Specialties Details Why Contact Info    Julian Lee MD Internal Medicine Schedule an appointment as soon as possible for a visit in 1 week  [address]  [place] [address]  [phone]               Ilya Rousseau MD  04/25/23 0604

## 2023-04-25 NOTE — ED TRIAGE NOTES
Aleksandra Santana, a 72 y.o. female presents to the ED w/ complaint of an infection to her upper left lip that started Friday and now has a pustule to her lip and there is pain and swelling radiating down left side of jaw and neck.  She denies any fever or other symptoms.     Triage note:  Chief Complaint   Patient presents with    Oral Swelling     PT C/O PROGRESSIVE SWELLING STARTING W/ PIMPLE TO UPPER LIP AND NOW SWELLING TO L LOWER JAW AND THROAT. STATES THAT SHE HAS SORENESS W/ SWALLOWING     Review of patient's allergies indicates:   Allergen Reactions    Codeine     Iodine and iodide containing products     Iodinated contrast media      Rash (skin)^  Rash (skin)^    Mobic [meloxicam]     Doxycycline Rash     Past Medical History:   Diagnosis Date    Abnormal EKG     Allergy     seasonal    Anxiety     Breast cyst     Cataract     Fibrocystic breast     GERD (gastroesophageal reflux disease)     History of colonic polyps     1 polyp 11/14 --5 yrs.    Hypertension     Hypothyroidism     IBS (irritable bowel syndrome)     Keloid cicatrix     Migraine syndrome     OA (osteoarthritis)     Osteopenia     BMD 5/14 --no tx -repeat 2 yrs    Osteoporosis, postmenopausal     Palpitations     SOB (shortness of breath)

## 2023-05-02 ENCOUNTER — PATIENT MESSAGE (OUTPATIENT)
Dept: FAMILY MEDICINE | Facility: CLINIC | Age: 73
End: 2023-05-02
Payer: MEDICARE

## 2023-05-02 RX ORDER — CIPROFLOXACIN 500 MG/1
500 TABLET ORAL 2 TIMES DAILY
Qty: 10 TABLET | Refills: 0 | Status: SHIPPED | OUTPATIENT
Start: 2023-05-02 | End: 2023-05-07

## 2023-05-31 ENCOUNTER — PES CALL (OUTPATIENT)
Dept: ADMINISTRATIVE | Facility: CLINIC | Age: 73
End: 2023-05-31
Payer: MEDICARE

## 2023-06-02 NOTE — TELEPHONE ENCOUNTER
No care due was identified.  [facility] Embedded Care Due Messages. Reference number: [id number].   6/02/2023 4:20:16 PM CDT

## 2023-06-05 RX ORDER — AZELASTINE 1 MG/ML
2 SPRAY, METERED NASAL 2 TIMES DAILY
Qty: 120 ML | Refills: 12 | Status: SHIPPED | OUTPATIENT
Start: 2023-06-05

## 2023-06-21 NOTE — TELEPHONE ENCOUNTER
Care Due:                  Date            Visit Type   Department     Provider  --------------------------------------------------------------------------------                                MYCHART                              ANNUAL       [facility]                              CHECKUP/PHY  MED/ INTERNAL  Julian Schulz  Last Visit: 09-      S            MED/ PEDS      [place]  Next Visit: None Scheduled  None         None Found                                                            Last  Test          Frequency    Reason                     Performed    Due Date  --------------------------------------------------------------------------------    Office Visit  12 months..  amLODIPine, azelastine,    09- 09-                             famotidine,                             hydroCHLOROthiazide,                             olmesartan, sumatriptan..    CMP.........  12 months..  famotidine,                09- 09-                             hydroCHLOROthiazide,                             olmesartan...............    [employer] Embedded Care Due Messages. Reference number: [id number].   6/21/2023 12:02:21 PM CDT

## 2023-06-21 NOTE — TELEPHONE ENCOUNTER
----- Message from Paula Acevedo sent at 6/21/2023 11:52 AM CDT -----  Type: RX Refill Request    Who Called: Self     Have you contacted your pharmacy:Yes     Refill or New Rx: New Rx     RX Name and Strength:levocetirizine (XYZAL) 5 MG tablet 90 tablet     Preferred Pharmacy with phone number:[facility]/pharmacy #[id number] - [place], [place] - [address]  Phone: [phone]  Fax: [phone]      Local or Mail Order:Local     Would the patient rather a call back or a response via [facility]? CALL     Best Call Back Number: [phone] (home)

## 2023-06-24 RX ORDER — LEVOCETIRIZINE DIHYDROCHLORIDE 5 MG/1
5 TABLET, FILM COATED ORAL DAILY
Qty: 90 TABLET | Refills: 12 | Status: SHIPPED | OUTPATIENT
Start: 2023-06-24

## 2023-06-29 RX ORDER — FOLIC ACID 1 MG/1
TABLET ORAL
Qty: 90 TABLET | Refills: 12 | Status: SHIPPED | OUTPATIENT
Start: 2023-06-29

## 2023-07-21 ENCOUNTER — PES CALL (OUTPATIENT)
Dept: ADMINISTRATIVE | Facility: CLINIC | Age: 73
End: 2023-07-21
Payer: MEDICARE

## 2023-08-07 ENCOUNTER — PATIENT MESSAGE (OUTPATIENT)
Dept: FAMILY MEDICINE | Facility: CLINIC | Age: 73
End: 2023-08-07
Payer: MEDICARE

## 2023-08-07 RX ORDER — BUTALBITAL, ACETAMINOPHEN AND CAFFEINE 50; 325; 40 MG/1; MG/1; MG/1
1 TABLET ORAL EVERY 4 HOURS PRN
Qty: 30 TABLET | Refills: 3 | Status: SHIPPED | OUTPATIENT
Start: 2023-08-07 | End: 2023-12-06 | Stop reason: SDUPTHER

## 2023-08-07 NOTE — TELEPHONE ENCOUNTER
Please advise,      Need NEW Rx for Fiorcet faxed to [facility] today please    original prescription was for# 30    pharmacy had 4 tabs  filled that   & when the balance came in erroneously only filled 4 not 26.. it is too old to fill   They faxed your office   I am having serious headaches..I have a yearly coming up   Thanks

## 2023-08-08 ENCOUNTER — TELEPHONE (OUTPATIENT)
Dept: FAMILY MEDICINE | Facility: CLINIC | Age: 73
End: 2023-08-08
Payer: MEDICARE

## 2023-08-08 NOTE — TELEPHONE ENCOUNTER
----- Message from Rabia Maxwell sent at 8/8/2023 12:58 PM CDT -----  .Type: Patient Call Back    Who called:self    What is the request in detail: needs [facility] number sent pharm for butalbital-acetaminophen-caffeine -40 mg (FIORICET, ESGIC) -40 mg per tablet    Can the clinic reply by [facility]? call    Would the patient rather a call back or a response via [facility]? call    Best call back number: .[phone]     Additional Information: ..  [facility]/pharmacy #[id number] - [place] - [address]  [address] [place] [address]  Phone: [phone] Fax: [phone]

## 2023-09-01 ENCOUNTER — TELEPHONE (OUTPATIENT)
Dept: FAMILY MEDICINE | Facility: CLINIC | Age: 73
End: 2023-09-01
Payer: MEDICARE

## 2023-09-01 DIAGNOSIS — Z86.010 HISTORY OF COLONIC POLYPS: ICD-10-CM

## 2023-09-01 DIAGNOSIS — Z00.00 ROUTINE MEDICAL EXAM: Primary | ICD-10-CM

## 2023-09-01 DIAGNOSIS — Z12.31 ENCOUNTER FOR SCREENING MAMMOGRAM FOR BREAST CANCER: ICD-10-CM

## 2023-09-01 DIAGNOSIS — E55.9 VITAMIN D DEFICIENCY DISEASE: ICD-10-CM

## 2023-09-01 DIAGNOSIS — E03.9 HYPOTHYROIDISM, UNSPECIFIED TYPE: ICD-10-CM

## 2023-09-01 DIAGNOSIS — D64.9 ANEMIA, UNSPECIFIED TYPE: ICD-10-CM

## 2023-09-01 DIAGNOSIS — I10 ESSENTIAL HYPERTENSION: ICD-10-CM

## 2023-09-01 NOTE — TELEPHONE ENCOUNTER
----- Message from Maria De Jesus Quan sent at 9/1/2023  3:39 PM CDT -----  Regarding: pt called  Type: Patient Call Back         Who called: MACK CLARK [[id number]]         What is the request in detail: Pt called for order for Colon Cancer Screening. Please Advise          Can the clinic reply by [facility]? No         Would the patient rather a call back or a response via [facility]? Call back         Best call back number:        [phone]                  Thank you.

## 2023-09-05 RX ORDER — CARVEDILOL 25 MG/1
TABLET ORAL
Qty: 90 TABLET | Refills: 11 | Status: SHIPPED | OUTPATIENT
Start: 2023-09-05

## 2023-09-13 ENCOUNTER — HOSPITAL ENCOUNTER (OUTPATIENT)
Dept: RADIOLOGY | Facility: OTHER | Age: 73
Discharge: HOME | End: 2023-09-13
Attending: INTERNAL MEDICINE
Payer: MEDICARE

## 2023-09-13 DIAGNOSIS — Z78.0 MENOPAUSE: ICD-10-CM

## 2023-09-13 PROCEDURE — 77080 DEXA BONE DENSITY SPINE HIP: ICD-10-PCS | Mod: 26,HCNC,, | Performed by: RADIOLOGY

## 2023-09-13 PROCEDURE — 77080 DXA BONE DENSITY AXIAL: CPT | Mod: TC,HCNC

## 2023-09-13 PROCEDURE — 77080 DXA BONE DENSITY AXIAL: CPT | Mod: 26,HCNC,, | Performed by: RADIOLOGY

## 2023-09-14 ENCOUNTER — LAB VISIT (OUTPATIENT)
Dept: LAB | Facility: HOSPITAL | Age: 73
End: 2023-09-14
Attending: INTERNAL MEDICINE
Payer: MEDICARE

## 2023-09-14 DIAGNOSIS — Z86.010 HISTORY OF COLONIC POLYPS: ICD-10-CM

## 2023-09-14 DIAGNOSIS — I10 ESSENTIAL HYPERTENSION: ICD-10-CM

## 2023-09-14 DIAGNOSIS — E55.9 VITAMIN D DEFICIENCY DISEASE: ICD-10-CM

## 2023-09-14 DIAGNOSIS — E03.9 HYPOTHYROIDISM, UNSPECIFIED TYPE: ICD-10-CM

## 2023-09-14 DIAGNOSIS — Z12.31 ENCOUNTER FOR SCREENING MAMMOGRAM FOR BREAST CANCER: ICD-10-CM

## 2023-09-14 DIAGNOSIS — Z00.00 ROUTINE MEDICAL EXAM: ICD-10-CM

## 2023-09-14 DIAGNOSIS — D64.9 ANEMIA, UNSPECIFIED TYPE: ICD-10-CM

## 2023-09-14 LAB
ALBUMIN SERPL BCP-MCNC: 3.6 G/DL (ref 3.5–5.2)
ALP SERPL-CCNC: 41 U/L (ref 55–135)
ALT SERPL W/O P-5'-P-CCNC: 15 U/L (ref 10–44)
ANION GAP SERPL CALC-SCNC: 13 MMOL/L (ref 8–16)
AST SERPL-CCNC: 17 U/L (ref 10–40)
BASOPHILS # BLD AUTO: 0.06 K/UL (ref 0–0.2)
BASOPHILS NFR BLD: 0.9 % (ref 0–1.9)
BILIRUB SERPL-MCNC: 0.4 MG/DL (ref 0.1–1)
BUN SERPL-MCNC: 20 MG/DL (ref 8–23)
CALCIUM SERPL-MCNC: 9.5 MG/DL (ref 8.7–10.5)
CHLORIDE SERPL-SCNC: 103 MMOL/L (ref 95–110)
CHOLEST SERPL-MCNC: 243 MG/DL (ref 120–199)
CHOLEST/HDLC SERPL: 2.3 {RATIO} (ref 2–5)
CO2 SERPL-SCNC: 24 MMOL/L (ref 23–29)
CREAT SERPL-MCNC: 0.7 MG/DL (ref 0.5–1.4)
DIFFERENTIAL METHOD: ABNORMAL
EOSINOPHIL # BLD AUTO: 0.5 K/UL (ref 0–0.5)
EOSINOPHIL NFR BLD: 7 % (ref 0–8)
ERYTHROCYTE [DISTWIDTH] IN BLOOD BY AUTOMATED COUNT: 13 % (ref 11.5–14.5)
EST. GFR  (NO RACE VARIABLE): >60 ML/MIN/1.73 M^2
GLUCOSE SERPL-MCNC: 72 MG/DL (ref 70–110)
HCT VFR BLD AUTO: 39.8 % (ref 37–48.5)
HDLC SERPL-MCNC: 107 MG/DL (ref 40–75)
HDLC SERPL: 44 % (ref 20–50)
HGB BLD-MCNC: 12.7 G/DL (ref 12–16)
IMM GRANULOCYTES # BLD AUTO: 0.01 K/UL (ref 0–0.04)
IMM GRANULOCYTES NFR BLD AUTO: 0.2 % (ref 0–0.5)
LDLC SERPL CALC-MCNC: 124.8 MG/DL (ref 63–159)
LYMPHOCYTES # BLD AUTO: 1.9 K/UL (ref 1–4.8)
LYMPHOCYTES NFR BLD: 29.5 % (ref 18–48)
MCH RBC QN AUTO: 32 PG (ref 27–31)
MCHC RBC AUTO-ENTMCNC: 31.9 G/DL (ref 32–36)
MCV RBC AUTO: 100 FL (ref 82–98)
MONOCYTES # BLD AUTO: 0.5 K/UL (ref 0.3–1)
MONOCYTES NFR BLD: 8.3 % (ref 4–15)
NEUTROPHILS # BLD AUTO: 3.5 K/UL (ref 1.8–7.7)
NEUTROPHILS NFR BLD: 54.1 % (ref 38–73)
NONHDLC SERPL-MCNC: 136 MG/DL
NRBC BLD-RTO: 0 /100 WBC
PLATELET # BLD AUTO: 271 K/UL (ref 150–450)
PMV BLD AUTO: 10.2 FL (ref 9.2–12.9)
POTASSIUM SERPL-SCNC: 3.7 MMOL/L (ref 3.5–5.1)
PROT SERPL-MCNC: 6.8 G/DL (ref 6–8.4)
RBC # BLD AUTO: 3.97 M/UL (ref 4–5.4)
SODIUM SERPL-SCNC: 140 MMOL/L (ref 136–145)
TRIGL SERPL-MCNC: 56 MG/DL (ref 30–150)
WBC # BLD AUTO: 6.54 K/UL (ref 3.9–12.7)

## 2023-09-14 PROCEDURE — 82306 VITAMIN D 25 HYDROXY: CPT | Mod: HCNC | Performed by: INTERNAL MEDICINE

## 2023-09-14 PROCEDURE — 85025 COMPLETE CBC W/AUTO DIFF WBC: CPT | Mod: HCNC | Performed by: INTERNAL MEDICINE

## 2023-09-14 PROCEDURE — 84439 ASSAY OF FREE THYROXINE: CPT | Mod: HCNC | Performed by: INTERNAL MEDICINE

## 2023-09-14 PROCEDURE — 84443 ASSAY THYROID STIM HORMONE: CPT | Mod: HCNC | Performed by: INTERNAL MEDICINE

## 2023-09-14 PROCEDURE — 36415 COLL VENOUS BLD VENIPUNCTURE: CPT | Mod: HCNC,PO | Performed by: INTERNAL MEDICINE

## 2023-09-14 PROCEDURE — 80061 LIPID PANEL: CPT | Mod: HCNC | Performed by: INTERNAL MEDICINE

## 2023-09-14 PROCEDURE — 80053 COMPREHEN METABOLIC PANEL: CPT | Mod: HCNC | Performed by: INTERNAL MEDICINE

## 2023-09-15 LAB
25(OH)D3+25(OH)D2 SERPL-MCNC: 26 NG/ML (ref 30–96)
T4 FREE SERPL-MCNC: 1.07 NG/DL (ref 0.71–1.51)
TSH SERPL DL<=0.005 MIU/L-ACNC: 0.19 UIU/ML (ref 0.4–4)

## 2023-09-26 ENCOUNTER — HOSPITAL ENCOUNTER (OUTPATIENT)
Dept: RADIOLOGY | Facility: HOSPITAL | Age: 73
Discharge: HOME | End: 2023-09-26
Attending: INTERNAL MEDICINE
Payer: MEDICARE

## 2023-09-26 DIAGNOSIS — Z12.31 ENCOUNTER FOR SCREENING MAMMOGRAM FOR BREAST CANCER: ICD-10-CM

## 2023-09-26 PROCEDURE — 77063 MAMMO DIGITAL SCREENING BILAT WITH TOMO: ICD-10-PCS | Mod: 26,HCNC,, | Performed by: RADIOLOGY

## 2023-09-26 PROCEDURE — 77067 MAMMO DIGITAL SCREENING BILAT WITH TOMO: ICD-10-PCS | Mod: 26,HCNC,, | Performed by: RADIOLOGY

## 2023-09-26 PROCEDURE — 77067 SCR MAMMO BI INCL CAD: CPT | Mod: 26,HCNC,, | Performed by: RADIOLOGY

## 2023-09-26 PROCEDURE — 77063 BREAST TOMOSYNTHESIS BI: CPT | Mod: 26,HCNC,, | Performed by: RADIOLOGY

## 2023-09-26 PROCEDURE — 77067 SCR MAMMO BI INCL CAD: CPT | Mod: TC,HCNC,PO

## 2023-09-28 ENCOUNTER — TELEPHONE (OUTPATIENT)
Dept: SURGERY | Facility: CLINIC | Age: 73
End: 2023-09-28
Payer: MEDICARE

## 2023-09-28 NOTE — TELEPHONE ENCOUNTER
----- Message from Letitia Ngo sent at 9/28/2023  1:19 PM CDT -----  Regarding: appt access  Contact: self @ [phone]  Pt is calling to schedule a f/u appt.  She was last seen 4-8-21.  Pls call.

## 2023-09-28 NOTE — TELEPHONE ENCOUNTER
Spoke with patient, she states she will contact Dr. Burnham office at [facility] to schedule and request an US.

## 2023-10-19 ENCOUNTER — PATIENT MESSAGE (OUTPATIENT)
Dept: FAMILY MEDICINE | Facility: CLINIC | Age: 73
End: 2023-10-19
Payer: MEDICARE

## 2023-10-19 DIAGNOSIS — E03.9 HYPOTHYROIDISM, UNSPECIFIED TYPE: ICD-10-CM

## 2023-10-19 NOTE — TELEPHONE ENCOUNTER
No care due was identified.  [facility] Embedded Care Due Messages. Reference number: [id number].   10/19/2023 2:48:33 PM CDT

## 2023-10-20 RX ORDER — HYDROCHLOROTHIAZIDE 25 MG/1
25 TABLET ORAL DAILY
Qty: 90 TABLET | Refills: 12 | Status: SHIPPED | OUTPATIENT
Start: 2023-10-20

## 2023-10-20 RX ORDER — AMLODIPINE BESYLATE 5 MG/1
5 TABLET ORAL DAILY
Qty: 90 TABLET | Refills: 11 | Status: SHIPPED | OUTPATIENT
Start: 2023-10-20

## 2023-10-20 RX ORDER — LEVOTHYROXINE SODIUM 88 UG/1
88 TABLET ORAL DAILY
Qty: 90 TABLET | Refills: 3 | Status: SHIPPED | OUTPATIENT
Start: 2023-10-20

## 2023-10-30 ENCOUNTER — PATIENT OUTREACH (OUTPATIENT)
Dept: ADMINISTRATIVE | Facility: HOSPITAL | Age: 73
End: 2023-10-30
Payer: MEDICARE

## 2023-10-30 ENCOUNTER — PATIENT MESSAGE (OUTPATIENT)
Dept: ADMINISTRATIVE | Facility: HOSPITAL | Age: 73
End: 2023-10-30
Payer: MEDICARE

## 2023-11-10 ENCOUNTER — OFFICE VISIT (OUTPATIENT)
Dept: URGENT CARE | Facility: CLINIC | Age: 73
End: 2023-11-10
Payer: MEDICARE

## 2023-11-10 VITALS
BODY MASS INDEX: 25.49 KG/M2 | TEMPERATURE: 98 F | HEIGHT: 61 IN | SYSTOLIC BLOOD PRESSURE: 126 MMHG | HEART RATE: 66 BPM | OXYGEN SATURATION: 96 % | WEIGHT: 135 LBS | DIASTOLIC BLOOD PRESSURE: 73 MMHG | RESPIRATION RATE: 18 BRPM
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
## 2023-11-10 PROCEDURE — 99213 PR OFFICE/OUTPT VISIT, EST, LEVL III, 20-29 MIN: ICD-10-PCS | Mod: S$GLB,,,

## 2023-11-10 PROCEDURE — 99213 OFFICE O/P EST LOW 20 MIN: CPT | Mod: S$GLB,,,

## 2023-11-10 RX ORDER — MUPIROCIN 20 MG/G
OINTMENT TOPICAL 3 TIMES DAILY
Qty: 15 G | Refills: 0 | Status: SHIPPED | OUTPATIENT
Start: 2023-11-10

## 2023-11-10 RX ORDER — AMOXICILLIN AND CLAVULANATE POTASSIUM 875; 125 MG/1; MG/1
1 TABLET, FILM COATED ORAL EVERY 12 HOURS
Qty: 14 TABLET | Refills: 0 | Status: SHIPPED | OUTPATIENT
Start: 2023-11-10 | End: 2023-11-17

## 2023-11-11 NOTE — PROGRESS NOTES
"Subjective:      Patient ID: Aleksandra Santana is a 73 y.o. female.    Vitals:  height is 5' 1" (1.549 m) and weight is 61.2 kg (135 lb). Her oral temperature is 98.4 °F (36.9 °C). Her blood pressure is 126/73 and her pulse is 66. Her respiration is 18 and oxygen saturation is 96%.     Chief Complaint: Animal Bite    Patient presents to urgent care due to a cat bite from her cat. She states that the incident happened 9 days ago. She states that there is redness and warmth to the area. She denies fever, chills, numbness, tingling, drainage. She states that she has taken some left over Augmentin that she had from a previous incident. Patient is UTD on tetanus, 2020.    Animal Bite   The incident occurred more than 2 days ago. The incident occurred at home. There is an injury to the Right forearm. The patient is experiencing no pain. It is unlikely that a foreign body is present. Pertinent negatives include no chest pain, no fussiness, no numbness, no visual disturbance, no abdominal pain, no bowel incontinence, no nausea, no vomiting, no bladder incontinence, no headaches, no hearing loss, no inability to bear weight, no neck pain, no pain when bearing weight, no focal weakness, no decreased responsiveness, no light-headedness, no loss of consciousness, no seizures, no tingling, no weakness, no cough, no difficulty breathing and no memory loss. There have been no prior injuries to these areas. Her tetanus status is UTD. She has been Behaving normally. There were no sick contacts.       Constitution: Negative for chills and fever.   HENT:  Negative for hearing loss.    Neck: Negative for neck pain.   Cardiovascular:  Negative for chest pain and sob on exertion.   Respiratory:  Negative for cough.    Gastrointestinal:  Negative for abdominal pain, nausea, vomiting, diarrhea and bowel incontinence.   Genitourinary:  Negative for bladder incontinence.   Skin:  Positive for erythema.   Neurological:  Negative for " light-headedness, focal weakness, headaches, loss of consciousness, numbness, tingling and seizures.      Objective:     Physical Exam   Constitutional:  Non-toxic appearance. She does not appear ill. No distress.      Comments:Patient is in no acute distress, patient is non-toxic appearing, patient is ox3, patient is answering question appropriately.   normal  Abdominal: Normal appearance.   Neurological: She is alert.   Skin: Skin is not diaphoretic. Capillary refill takes less than 2 seconds. erythema         Comments: There is an area of erythema appreciated on the right forearm. There is warmth associated with this erythema. There is no area of fluctuance, no area of induration, no discharge appreciated on exam. Radial pulse is 2+. Great  strength. Capillary refill, <2 seconds. Sensation intact. Area outlined with marker for patient to monitor for worsened conditions.   Nursing note and vitals reviewed.    Assessment:     1. Cat bite, initial encounter      Plan:   Previous notes reviewed.  Vital signs reviewed.  Discussed cat bite, home care, tx options, and given follow up precautions.  Patient was briefed on my thought process and diagnosis.   Patient involved with the treatment plan and agreed to the plan.  Patient informed on warning signs, patient understood warning signs and to go to urgent care or ER if warning signs appear.  Please excuse grammatical/spelling errors appreciated throughout this visit encounter for a remote dictation device was used during this encounter.    Patient Instructions   Please return here or go to the Emergency Department for any concerns or worsening of condition.    Please take AUGMENTIN for CAT BITE. Please complete the full course of this antibiotics. Please take this antibiotic with food and a full glass of water.    Please apply MUPIROCIN to the affected area.    Please be advised that you are UP TO DATE on your tetanus vaccination.    Please follow up with your  primary care doctor or specialist as needed.    If you  smoke, please stop smoking.    Cat bite, initial encounter  -     amoxicillin-clavulanate 875-125mg (AUGMENTIN) 875-125 mg per tablet; Take 1 tablet by mouth every 12 (twelve) hours. for 7 days  Dispense: 14 tablet; Refill: 0  -     mupirocin (BACTROBAN) 2 % ointment; Apply topically 3 (three) times daily.  Dispense: 15 g; Refill: 0      Konstantin Felder PA-C

## 2023-11-11 NOTE — PATIENT INSTRUCTIONS
Please return here or go to the Emergency Department for any concerns or worsening of condition.    Please take AUGMENTIN for CAT BITE. Please complete the full course of this antibiotics. Please take this antibiotic with food and a full glass of water.    Please apply MUPIROCIN to the affected area.    Please be advised that you are UP TO DATE on your tetanus vaccination.    Please follow up with your primary care doctor or specialist as needed.    If you  smoke, please stop smoking.

## 2023-11-16 ENCOUNTER — PATIENT MESSAGE (OUTPATIENT)
Dept: FAMILY MEDICINE | Facility: CLINIC | Age: 73
End: 2023-11-16
Payer: MEDICARE

## 2023-11-16 NOTE — TELEPHONE ENCOUNTER
No care due was identified.  [facility] Embedded Care Due Messages. Reference number: [id number].   11/16/2023 2:05:45 PM CST

## 2023-11-18 ENCOUNTER — NURSE TRIAGE (OUTPATIENT)
Dept: ADMINISTRATIVE | Facility: CLINIC | Age: 73
End: 2023-11-18
Payer: MEDICARE

## 2023-11-18 RX ORDER — OLMESARTAN MEDOXOMIL 40 MG/1
40 TABLET ORAL DAILY
Qty: 90 TABLET | Refills: 1 | Status: SHIPPED | OUTPATIENT
Start: 2023-11-18 | End: 2023-12-06

## 2023-11-18 NOTE — TELEPHONE ENCOUNTER
Reason for Disposition   [1] Prescription refill request for ESSENTIAL medicine (i.e., likelihood of harm to patient if not taken) AND [2] triager unable to refill per department policy    Additional Information   Negative: New-onset or worsening symptoms, see that guideline (e.g., diarrhea, runny nose, sore throat)   Negative: Medicine question not related to refill or renewal   Negative: Caller (e.g., patient or pharmacist) requesting information about a new medicine   Negative: Caller requesting information unrelated to medicine    Protocols used: Medication Refill and Renewal Call-A-  Pt states she needs a refill of her Benicar on file. Spoke with Provider on call Dr. Vora. She approved a refill for the order on file:  olmesartan (BENICAR) 40 MG tablet   Route: Take 1 tablet (40 mg total) by mouth once daily. - Oral   Sent to pharmacy as: olmesartan (BENICAR) 40 MG tablet       90 tablet       Above order called in to Marlin (Pharmacy intern) at [facility]  Pt notified and verbalized understanding.

## 2023-11-20 RX ORDER — OLMESARTAN MEDOXOMIL 40 MG/1
40 TABLET ORAL DAILY
Qty: 90 TABLET | Refills: 12 | Status: SHIPPED | OUTPATIENT
Start: 2023-11-20

## 2023-12-05 NOTE — PROGRESS NOTES
Chief complaint, physical exam,    73-year-old white female who admittedly does not like going to the doctor. Last seen 2018 then .  She is here for her physical.  Regarding health maintenance it's been about 1 year since she had lab work.   mammogram.  She is overdue on her colonoscopy -due .  She would like to get it done at the [facility] but okay with the [facility] since it is closer to her house.  Due to general orthopedic issues she has been exercising less and think she is getting deconditioned.  Colon Order in       ROS:   CONST: weight stable. EYES: no vision change. ENT: no sore throat. CV: no chest pain w/ exertion. RESP:No orthopnea PND or cough. GI: no nausea, vomiting, diarrhea. No dysphagia. : no urinary issues. MUSCULOSKELETAL: no new myalgias or arthralgias except for some issues with the left shoulder blade and left medial elbow SKIN: no new changes. NEURO: no focal deficits. PSYCH: no new issues. ENDOCRINE: no polyuria. HEME: no lymph nodes. ALLERGY: no general pruritis.         PAST MEDICAL HISTORY:    1. Endometriosis,   2. Hypertension -was followed by Nephrology in the past, Onset age 24          3. Osteoarthritis,Hands back and neck  4. Migraine.  5. IBS with constipation- was seen by [facility]  6. H/o palpitations- neg w/u by Cards  7. Anxiety with h/o panic attacks  8. GERD - Esophagitis/gastritis by EGD , hiatal hernia on EGD     9. HYPOthyroidism -saw Endo in the past.  10. Osteoporosis by BMD  by Dr Garcia. Osteopenia , intolerant to Fosamax orally in the past  11. Colonoscopy with one polyp , 5 years  12.  Left medial epicondylitis                                                                                                                         PAST SURGICAL HISTORY:  Hysterectomy, a , surgeries for             endometriosis, and tonsillectomy and appendectomy.                                                                                                         SOCIAL HISTORY:  No tobacco use.  Drinks 2-3 glasses of wine every day or    every other day.      Vital signs as above  Gen: no distress  EYES: conjunctiva clear, non-icteric, PERRL  ENT: nose clear, nasal mucosa normal, oropharynx clear and moist, teeth good,   NECK:supple, thyroid non-palpable  RESP: effort is good, lungs clear  CV: heart RRR w/o murmur, gallops or rubs; no carotid bruits, no edema  GI: abdomen soft, non-distended, non-tender, no hepatosplenomegaly  MS: gait normal, no clubbing or cyanosis of the digits,  Skin no rashes, warm to touch    Aleksandra was seen today for annual exam.    Diagnoses and all orders for this visit:    Routine medical exam, overdue for colonoscopy, done mammogram, discussed vaccines           Encounter for screening mammogram for breast cancer, UTD  -        History of colonic polyps, very overdue, looks like order has been placed and discuss the referral process.  -     order in                                        Additional evaluation and management issues:    Additionally, patient has numerous other medical issues and complaints to address today separate from her physical.  Patient never did take the Boniva given by Dr Tang?.  She did see local Endocrine who encouraged her to take it again looking for GI side effects that she had daily Fosamax.    If indeed she tries parental therapy am sure will get a baseline prior to treatment.  Discussed the treatment will be an investment in her future and if she breaks hip she will be dependent upon family.  She is willing to retry Boniva discussed proper methodology for taking.    9/23  Osteoporosis both hips.  Osteopenia lumbar spine.  10-year Probability of Fracture:   Major Osteoporotic Fracture 18.5%.   Hip Fracture 6.4%.   Consider FDA approved medical therapies in postmenopausal women and men aged 50 years and older, based on the following:   *A hip or vertebral (clinical or morphometric)  fracture  *T score less than or equal to -2.5 at the femoral neck or spine after appropriate evaluation to exclude secondary causes.  *Low bone mass -- also known as osteopenia (T score between -1.0 and -2.5 at the femoral neck or spine) and a 10 year probability of hip fracture greater than or equal to 3% or a 10 year probability of major osteoporosis-related fracture greater than or equal to 20% based on the [place]-adapted WHO algorithm.        history of headaches..  She does not have him all the time.  It looks like she would intermittently get some tension-type headaches with migraine features.  Her neck muscles do hurt and she sometimes has to assist lifting her head up further probably is a muscle component and the headache does go from back to front.  It then also becomes a bitemporal throbbing headache with photophobia and nausea to the point of vomiting.  I reviewed the prior neurology notes and she did respond to Imitrex 25 mg in the past so will prescribe that again along with the Fioricet which she does respond to as well and takes it rarely.  I think she has a component of migraine with possible vertigo association and definitely a tension-type headache it could be the tension type headache that is triggering her migraines.  We discussed application of heat.  She does not like to take medications.  She was prescribed Robaxin in the past but may be never took it.  Her son has some at home and he does well with it and encouraged her to possibly even take one at night and assess if she gets a little bit of improvement and neck muscle stiffness in the morning and then to apply heat.    Seen ENT- sinus triggers migraine, nausea, seen ENT 4-5 x, fioricet once from them and once me.  She still has some available so I do not think she is overusing it was a small supply so will give her a refill.    Htn -ok    HYPOTHYROIDISM - wt up but TSH down, T4 ok.  She says she still losing her hair.  I would recheck her  thyroid function but symptoms and lab work did not point to any particular sign of overtreatment    Still under lot of stress.  That is increased her headaches.  Her son has lot of medical problems and drug problems.  She has some depression and reduced motivational symptoms but also lot of stress anxiety and so forth.  She was given Ativan remotely and she knows it is just a Band-Aid for the problem.  After long discussion about treating the primary problem she is willing to take Lexapro 5 mg we discussed potential expectations.  We might need to increase the dose.    She does have history of gastritis but no current symptoms and no symptoms which would suggest the need for an EGD with the upcoming colonoscopy we discussed that.  Her GI symptoms and reflux come and go.  She was on Prilosec we discussed potential long-term issues and would be better to control it with lifestyle modification and Pepcid 20 mg twice a day so will send that to the pharmacy.    She has some varicose veins enlargement in the left leg.  We did refer to vascular and apparently she did have ultrasound and varicose veins but apparently her insurance did not cover treatment unless she had ulcers to the lower extremities.  She says the varicose veins or bigger and I would encourage her to make follow-up with vascular so documentation may eventually lead to coverage.  She does need to reschedule her vascular appointment         All these various issues reviewed and patient counseled and her evaluation and management today will be based upon time MDM And time spent with patient.          Assessment and plan:          Osteoporosis, postmenopausal, long discussion, initiate treatment    Hypothyroidism, unspecified type, difficult to say if supplement is excessive but will continue to follow, repeat labs in two months after initiating vitamin-D  -     TSH; Future  -     T4, Free; Future    Essential hypertension, chronic and stable  -     Basic  Metabolic Panel; Future    Anemia, unspecified type, chronic and stable    Irritable bowel syndrome with constipation, chronic and stable    Vitamin D deficiency disease, off supplement so will start 2000 per day and recheck in two months  -     Vitamin D; Future    Chronic constipation    Vertigo, stable at present apparently when she gets sinus she gets dizzy but the nausea associated with that headache appears to be more migraine related and not just the vertigo    Migraine syndrome, Fioricet refilled    Mood disorder, new problem, initiate treatment with Ativan and Lexapro    Other orders  -     estrogens, conjugated, (PREMARIN) 1.25 MG tablet; Take 1 tablet (1.25 mg total) by mouth once daily.  -     butalbital-acetaminophen-caffeine -40 mg (FIORICET, ESGIC) -40 mg per tablet; Take 1 tablet by mouth every 4 (four) hours as needed for Headaches.  -     ibandronate (BONIVA) 150 mg tablet; Take 1 tablet (150 mg total) by mouth every 30 days.  -     LORazepam (ATIVAN) 0.5 MG tablet; Take 1 tablet (0.5 mg total) by mouth every 6 (six) hours as needed for Anxiety.  -     EScitalopram oxalate (LEXAPRO) 5 MG Tab; Take 1 tablet (5 mg total) by mouth once daily.

## 2023-12-06 ENCOUNTER — OFFICE VISIT (OUTPATIENT)
Dept: FAMILY MEDICINE | Facility: CLINIC | Age: 73
End: 2023-12-06
Payer: MEDICARE

## 2023-12-06 VITALS
WEIGHT: 137.56 LBS | BODY MASS INDEX: 25.97 KG/M2 | DIASTOLIC BLOOD PRESSURE: 62 MMHG | HEIGHT: 61 IN | SYSTOLIC BLOOD PRESSURE: 128 MMHG | HEART RATE: 62 BPM | OXYGEN SATURATION: 98 % | TEMPERATURE: 98 F

## 2023-12-06 DIAGNOSIS — Z00.00 ROUTINE MEDICAL EXAM: Primary | ICD-10-CM

## 2023-12-06 DIAGNOSIS — Z12.11 SCREENING FOR COLORECTAL CANCER: ICD-10-CM

## 2023-12-06 DIAGNOSIS — K58.1 IRRITABLE BOWEL SYNDROME WITH CONSTIPATION: ICD-10-CM

## 2023-12-06 DIAGNOSIS — Z86.010 HISTORY OF COLONIC POLYPS: ICD-10-CM

## 2023-12-06 DIAGNOSIS — M81.0 OSTEOPOROSIS, POSTMENOPAUSAL: ICD-10-CM

## 2023-12-06 DIAGNOSIS — F39 MOOD DISORDER: ICD-10-CM

## 2023-12-06 DIAGNOSIS — R42 VERTIGO: ICD-10-CM

## 2023-12-06 DIAGNOSIS — E03.9 HYPOTHYROIDISM, UNSPECIFIED TYPE: ICD-10-CM

## 2023-12-06 DIAGNOSIS — Z12.12 SCREENING FOR COLORECTAL CANCER: ICD-10-CM

## 2023-12-06 DIAGNOSIS — I10 ESSENTIAL HYPERTENSION: ICD-10-CM

## 2023-12-06 DIAGNOSIS — D64.9 ANEMIA, UNSPECIFIED TYPE: ICD-10-CM

## 2023-12-06 DIAGNOSIS — E55.9 VITAMIN D DEFICIENCY DISEASE: ICD-10-CM

## 2023-12-06 DIAGNOSIS — G43.909 MIGRAINE SYNDROME: ICD-10-CM

## 2023-12-06 DIAGNOSIS — K59.09 CHRONIC CONSTIPATION: ICD-10-CM

## 2023-12-06 PROCEDURE — 4010F PR ACE/ARB THEARPY RXD/TAKEN: ICD-10-PCS | Mod: HCNC,CPTII,S$GLB, | Performed by: INTERNAL MEDICINE

## 2023-12-06 PROCEDURE — 3074F SYST BP LT 130 MM HG: CPT | Mod: HCNC,CPTII,S$GLB, | Performed by: INTERNAL MEDICINE

## 2023-12-06 PROCEDURE — 99214 PR OFFICE/OUTPT VISIT, EST, LEVL IV, 30-39 MIN: ICD-10-PCS | Mod: HCNC,25,S$GLB, | Performed by: INTERNAL MEDICINE

## 2023-12-06 PROCEDURE — 1126F PR PAIN SEVERITY QUANTIFIED, NO PAIN PRESENT: ICD-10-PCS | Mod: HCNC,CPTII,S$GLB, | Performed by: INTERNAL MEDICINE

## 2023-12-06 PROCEDURE — 3078F DIAST BP <80 MM HG: CPT | Mod: HCNC,CPTII,S$GLB, | Performed by: INTERNAL MEDICINE

## 2023-12-06 PROCEDURE — 3074F PR MOST RECENT SYSTOLIC BLOOD PRESSURE < 130 MM HG: ICD-10-PCS | Mod: HCNC,CPTII,S$GLB, | Performed by: INTERNAL MEDICINE

## 2023-12-06 PROCEDURE — 3078F PR MOST RECENT DIASTOLIC BLOOD PRESSURE < 80 MM HG: ICD-10-PCS | Mod: HCNC,CPTII,S$GLB, | Performed by: INTERNAL MEDICINE

## 2023-12-06 PROCEDURE — 99999 PR PBB SHADOW E&M-EST. PATIENT-LVL III: ICD-10-PCS | Mod: PBBFAC,HCNC,, | Performed by: INTERNAL MEDICINE

## 2023-12-06 PROCEDURE — 4010F ACE/ARB THERAPY RXD/TAKEN: CPT | Mod: HCNC,CPTII,S$GLB, | Performed by: INTERNAL MEDICINE

## 2023-12-06 PROCEDURE — 99999 PR PBB SHADOW E&M-EST. PATIENT-LVL III: CPT | Mod: PBBFAC,HCNC,, | Performed by: INTERNAL MEDICINE

## 2023-12-06 PROCEDURE — 99397 PR PREVENTIVE VISIT,EST,65 & OVER: ICD-10-PCS | Mod: HCNC,S$GLB,, | Performed by: INTERNAL MEDICINE

## 2023-12-06 PROCEDURE — 99397 PER PM REEVAL EST PAT 65+ YR: CPT | Mod: HCNC,S$GLB,, | Performed by: INTERNAL MEDICINE

## 2023-12-06 PROCEDURE — 1126F AMNT PAIN NOTED NONE PRSNT: CPT | Mod: HCNC,CPTII,S$GLB, | Performed by: INTERNAL MEDICINE

## 2023-12-06 PROCEDURE — 1159F PR MEDICATION LIST DOCUMENTED IN MEDICAL RECORD: ICD-10-PCS | Mod: HCNC,CPTII,S$GLB, | Performed by: INTERNAL MEDICINE

## 2023-12-06 PROCEDURE — 1159F MED LIST DOCD IN RCRD: CPT | Mod: HCNC,CPTII,S$GLB, | Performed by: INTERNAL MEDICINE

## 2023-12-06 PROCEDURE — 99214 OFFICE O/P EST MOD 30 MIN: CPT | Mod: HCNC,25,S$GLB, | Performed by: INTERNAL MEDICINE

## 2023-12-06 RX ORDER — LORAZEPAM 0.5 MG/1
0.5 TABLET ORAL EVERY 6 HOURS PRN
Qty: 30 TABLET | Refills: 3 | Status: SHIPPED | OUTPATIENT
Start: 2023-12-06 | End: 2024-01-05

## 2023-12-06 RX ORDER — IBANDRONATE SODIUM 150 MG/1
150 TABLET, FILM COATED ORAL
Qty: 3 TABLET | Refills: 11 | Status: SHIPPED | OUTPATIENT
Start: 2023-12-06

## 2023-12-06 RX ORDER — ESCITALOPRAM OXALATE 5 MG/1
5 TABLET ORAL DAILY
Qty: 30 TABLET | Refills: 11 | Status: SHIPPED | OUTPATIENT
Start: 2023-12-06 | End: 2024-12-05

## 2023-12-06 RX ORDER — BUTALBITAL, ACETAMINOPHEN AND CAFFEINE 50; 325; 40 MG/1; MG/1; MG/1
1 TABLET ORAL EVERY 4 HOURS PRN
Qty: 30 TABLET | Refills: 3 | Status: SHIPPED | OUTPATIENT
Start: 2023-12-06

## 2023-12-19 ENCOUNTER — TELEPHONE (OUTPATIENT)
Dept: FAMILY MEDICINE | Facility: CLINIC | Age: 73
End: 2023-12-19
Payer: MEDICARE

## 2023-12-19 DIAGNOSIS — R39.89 SUSPECTED UTI: Primary | ICD-10-CM

## 2023-12-19 NOTE — TELEPHONE ENCOUNTER
----- Message from Gina Gladys sent at 12/19/2023  2:44 PM CST -----  Regarding: self .[phone]    .Type: Patient Call Back    Who called: self     What is the request in detail: Pt stated that she started having a UTI w/ blood in her urine on Friday. Pt started taking  cipro 500 mgs and it was only 3 days worth. Pt is requesting to know if she can get an order to test her urine and get a rx called in to the pharmacy. Pt stated that she still feels that it may be there   .  [facility]/pharmacy #[id number] - [place] - [address]  [address] [address]  Phone: [phone] Fax: [phone]    Can the clinic reply by [facility]? Call back    Would the patient rather a call back or a response via [facility]? Call back   Best call back number: .[phone]

## 2023-12-19 NOTE — TELEPHONE ENCOUNTER
Please advise,    Pt stated that she started having a UTI w/ blood in her urine on Friday. Pt started taking  cipro 500 mgs and it was only 3 days worth. Pt is requesting to know if she can get an order to test her urine and get a rx called in to the pharmacy. Pt stated that she still feels that it may be there

## 2023-12-20 ENCOUNTER — TELEPHONE (OUTPATIENT)
Dept: FAMILY MEDICINE | Facility: CLINIC | Age: 73
End: 2023-12-20
Payer: MEDICARE

## 2023-12-20 ENCOUNTER — LAB VISIT (OUTPATIENT)
Dept: LAB | Facility: HOSPITAL | Age: 73
End: 2023-12-20
Payer: MEDICARE

## 2023-12-20 DIAGNOSIS — R39.89 SUSPECTED UTI: ICD-10-CM

## 2023-12-20 PROCEDURE — 81003 URINALYSIS AUTO W/O SCOPE: CPT | Mod: HCNC | Performed by: NURSE PRACTITIONER

## 2023-12-20 NOTE — TELEPHONE ENCOUNTER
----- Message from Ivonne Soriano sent at 12/20/2023  4:34 PM CST -----  Type: Patient Call Back    Who called:pt     What is the request in detail:pt requesting to speak to nurse in regards to urinalysis. Wasn't called to notify the orders were in. Call pt     Can the clinic reply by [facility]?    Would the patient rather a call back or a response via [facility]? call    Best call back number:[phone] (home)       Additional Information:

## 2023-12-21 ENCOUNTER — TELEPHONE (OUTPATIENT)
Dept: UROLOGY | Facility: CLINIC | Age: 73
End: 2023-12-21
Payer: MEDICARE

## 2023-12-21 ENCOUNTER — TELEPHONE (OUTPATIENT)
Dept: FAMILY MEDICINE | Facility: CLINIC | Age: 73
End: 2023-12-21
Payer: MEDICARE

## 2023-12-21 LAB
BILIRUB UR QL STRIP: NEGATIVE
CLARITY UR REFRACT.AUTO: CLEAR
COLOR UR AUTO: NORMAL
GLUCOSE UR QL STRIP: NEGATIVE
HGB UR QL STRIP: NEGATIVE
KETONES UR QL STRIP: NEGATIVE
LEUKOCYTE ESTERASE UR QL STRIP: NEGATIVE
NITRITE UR QL STRIP: NEGATIVE
PH UR STRIP: 6 [PH] (ref 5–8)
PROT UR QL STRIP: NEGATIVE
SP GR UR STRIP: 1.01 (ref 1–1.03)
URN SPEC COLLECT METH UR: NORMAL

## 2023-12-21 NOTE — TELEPHONE ENCOUNTER
Spoke with patient and informed her that her urine test was still in progress and due to provider ordering a urine culture it will take 24-48 hours for results to come in. Patient concern is that she has had bladder infections in the past and the provider put her on Cipro for 7 days and the bleeding resolves but this time she was only giving 3 days of Cipro and she feeling as though infection has not resolve and if she starts bleeding before the results comes in she will just go to the  emergency room.

## 2023-12-21 NOTE — TELEPHONE ENCOUNTER
Called pt concerning pts message. I explained to pt that I can't prescribe medication over the phone as well as the urine culture results were not received yet ( they are stilling pending)  I advised patient see their PCP or to go to the emergency room/urgent care to treat possible UTI. Patient was frustrated and voiced understandings.         Trina Solomon Staff  Caller: pt @ [phone] (Today,  1:54 PM)  Rx Refill/Request Is this a Refill or New Rx: refill    Rx Name and Strength: ciprofloxacin HCl (CIPRO) 500 MG tablet    Preferred Pharmacy with phone number:  [facility]/pharmacy #[id number] - [place], [place] [facility] [address] [address]  Phone: [phone] Fax: [phone]      Communication Preference: call back    Additional Information:  Pt is requesting a call back from someone in the office as last weekend she had a bad UTI last weekend with blood; pt is advising that she took ciprofloxacin HCl (CIPRO) 500 MG tablet for three days and pt is out of RX.  Pt is advising that she had a urine culture done on yesterday and is still waiting on results. Pt is calling to be advised further or have another RX sent in. Thank you for all you are doing.

## 2023-12-22 ENCOUNTER — PATIENT MESSAGE (OUTPATIENT)
Dept: UROLOGY | Facility: CLINIC | Age: 73
End: 2023-12-22
Payer: MEDICARE

## 2023-12-26 ENCOUNTER — PATIENT MESSAGE (OUTPATIENT)
Dept: FAMILY MEDICINE | Facility: CLINIC | Age: 73
End: 2023-12-26
Payer: MEDICARE

## 2023-12-26 NOTE — TELEPHONE ENCOUNTER
Spoke with patient. Patient requesting paxloid. Patient was advised that provider recommends over the counter to treat patient's symptoms. Patient was advised to go to urgent care if symptoms worsen.

## 2023-12-27 ENCOUNTER — NURSE TRIAGE (OUTPATIENT)
Dept: ADMINISTRATIVE | Facility: CLINIC | Age: 73
End: 2023-12-27
Payer: MEDICARE

## 2023-12-27 NOTE — TELEPHONE ENCOUNTER
[place]    PCP:  Dr. Julian Lee    Tested + for Covid by home test on 12/25.  Symptoms started 12/24.  C/O body aches, sore throat, white patches in throat, runny/stuffed nose, sneezing, fatigue, HA, neck pain, and SOB with activity.  Taking Tylenol, sinus congestion med, and Xyzal.  Denies CP and fever.  Per protocol, care advised is go to the C/ED now.  Pt VU and refused care advised.  Care advice reinforced and pt continues to refuse care advice.  Advised to call for worsening/questions/concerns.  VU.    Reason for Disposition   Drinking very little and dehydration suspected (e.g., no urine > 12 hours, very dry mouth, very lightheaded)    Additional Information   Negative: SEVERE difficulty breathing (e.g., struggling for each breath, speaks in single words)   Negative: Difficult to awaken or acting confused (e.g., disoriented, slurred speech)   Negative: Bluish (or gray) lips or face now   Negative: Shock suspected (e.g., cold/pale/clammy skin, too weak to stand, low BP, rapid pulse)   Negative: Sounds like a life-threatening emergency to the triager   Negative: SEVERE or constant chest pain or pressure  (Exception: Mild central chest pain, present only when coughing.)   Negative: MODERATE difficulty breathing (e.g., speaks in phrases, SOB even at rest, pulse 100-120)   Negative: Headache and stiff neck (can't touch chin to chest)   Negative: Chest pain or pressure  (Exception: MILD central chest pain, present only when coughing.)    Protocols used: Coronavirus (COVID-19) Diagnosed or [id number]

## 2024-02-06 ENCOUNTER — OFFICE VISIT (OUTPATIENT)
Dept: OPTOMETRY | Facility: CLINIC | Age: 74
End: 2024-02-06
Payer: MEDICARE

## 2024-02-06 DIAGNOSIS — H25.13 NUCLEAR SCLEROSIS OF BOTH EYES: ICD-10-CM

## 2024-02-06 DIAGNOSIS — H40.1131 PRIMARY OPEN ANGLE GLAUCOMA (POAG) OF BOTH EYES, MILD STAGE: Primary | ICD-10-CM

## 2024-02-06 DIAGNOSIS — H04.123 DRY EYE SYNDROME, BILATERAL: ICD-10-CM

## 2024-02-06 DIAGNOSIS — H52.7 REFRACTIVE ERROR: ICD-10-CM

## 2024-02-06 PROCEDURE — 1126F AMNT PAIN NOTED NONE PRSNT: CPT | Mod: HCNC,CPTII,S$GLB, | Performed by: OPTOMETRIST

## 2024-02-06 PROCEDURE — 99204 OFFICE O/P NEW MOD 45 MIN: CPT | Mod: HCNC,S$GLB,, | Performed by: OPTOMETRIST

## 2024-02-06 PROCEDURE — 1101F PT FALLS ASSESS-DOCD LE1/YR: CPT | Mod: HCNC,CPTII,S$GLB, | Performed by: OPTOMETRIST

## 2024-02-06 PROCEDURE — 92133 CPTRZD OPH DX IMG PST SGM ON: CPT | Mod: HCNC,S$GLB,, | Performed by: OPTOMETRIST

## 2024-02-06 PROCEDURE — 1159F MED LIST DOCD IN RCRD: CPT | Mod: HCNC,CPTII,S$GLB, | Performed by: OPTOMETRIST

## 2024-02-06 PROCEDURE — 1160F RVW MEDS BY RX/DR IN RCRD: CPT | Mod: HCNC,CPTII,S$GLB, | Performed by: OPTOMETRIST

## 2024-02-06 PROCEDURE — 99999 PR PBB SHADOW E&M-EST. PATIENT-LVL III: CPT | Mod: PBBFAC,HCNC,, | Performed by: OPTOMETRIST

## 2024-02-06 PROCEDURE — 3288F FALL RISK ASSESSMENT DOCD: CPT | Mod: HCNC,CPTII,S$GLB, | Performed by: OPTOMETRIST

## 2024-02-06 RX ORDER — LATANOPROST 50 UG/ML
1 SOLUTION/ DROPS OPHTHALMIC NIGHTLY
Qty: 7.5 ML | Refills: 2 | Status: SHIPPED | OUTPATIENT
Start: 2024-02-06

## 2024-02-06 NOTE — PROGRESS NOTES
Subjective:       Patient ID: Aleksandra Santana is a 73 y.o. female      Chief Complaint   Patient presents with    Concerns About Ocular Health     History of Present Illness  Dls: 9/10/20 Dr. Amezcua     74 y/o female presents today for ocular health check.  Pt c/o blurry vision at distance and near ou.   Pt wears single vision glasses for reading   Swollen lids in am ou    No tearing  + ou off/on itching  No burning  No pain  +  ha's  + ou off/on floaters  No flashes    Eye meds  None    Pohx:   None    Fohx:  Cat -mother      Assessment/Plan:     1. Primary open angle glaucoma (POAG) of both eyes, mild stage  Elevated IOP OD. OCT with RNFL thinning OD >> OS. No Fhx. Discussed with pt. Start latanoprost QHS OU. 3 month IOP/HVF.   - latanoprost 0.005 % ophthalmic solution; Place 1 drop into both eyes every evening.  Dispense: 7.5 mL; Refill: 2  - Phillips Visual Field - OU - Extended - Both Eyes; Future  - Posterior Segment OCT Optic Nerve- Both eyes    2. Nuclear sclerosis of both eyes  NVS per pt. Educated pt on presence of cataracts and effects on vision. No surgery at this time. Recheck in one year, sooner PRN.    Can refer to [facility] clinic for CEIOL when ready.     3. Dry eye syndrome, bilateral   Recommend Refresh/Systane BID - QID OU PRN.     4. Refractive error  Educated patient on refractive error and discussed lens options. Dispensed updated spectacle Rx. Educated about adaptation period to new specs.    Eyeglass Final Rx       Eyeglass Final Rx         Sphere Cylinder Axis Add    Right +1.50 +0.75 015 +2.50    Left +2.00 +0.75 160 +2.50      Expiration Date: 2/6/2025                      Follow up in about 3 months (around 5/10/2024) for HVF 24-2, IOP check, CCT.

## 2024-02-09 ENCOUNTER — TELEPHONE (OUTPATIENT)
Dept: UROLOGY | Facility: CLINIC | Age: 74
End: 2024-02-09
Payer: MEDICARE

## 2024-02-09 NOTE — TELEPHONE ENCOUNTER
Called pt in regards to appt on 2/27/24 at 4:00 pm. Informed pt that Dr Cruz will no longer be seeing pt after 3:40 pm. Asked pt if it was ok to move her to the 3:40 pm slot. Pt accepted this time and date.

## 2024-02-27 ENCOUNTER — OFFICE VISIT (OUTPATIENT)
Dept: UROLOGY | Facility: CLINIC | Age: 74
End: 2024-02-27
Payer: MEDICARE

## 2024-02-27 VITALS
WEIGHT: 137.56 LBS | DIASTOLIC BLOOD PRESSURE: 75 MMHG | BODY MASS INDEX: 25.97 KG/M2 | HEIGHT: 61 IN | SYSTOLIC BLOOD PRESSURE: 137 MMHG | HEART RATE: 104 BPM

## 2024-02-27 DIAGNOSIS — R31.9 HEMATURIA, UNSPECIFIED TYPE: Primary | ICD-10-CM

## 2024-02-27 PROCEDURE — 1101F PT FALLS ASSESS-DOCD LE1/YR: CPT | Mod: CPTII,S$GLB,, | Performed by: UROLOGY

## 2024-02-27 PROCEDURE — 3288F FALL RISK ASSESSMENT DOCD: CPT | Mod: CPTII,S$GLB,, | Performed by: UROLOGY

## 2024-02-27 PROCEDURE — 99999 PR PBB SHADOW E&M-EST. PATIENT-LVL IV: CPT | Mod: PBBFAC,,, | Performed by: UROLOGY

## 2024-02-27 PROCEDURE — 3078F DIAST BP <80 MM HG: CPT | Mod: CPTII,S$GLB,, | Performed by: UROLOGY

## 2024-02-27 PROCEDURE — 3075F SYST BP GE 130 - 139MM HG: CPT | Mod: CPTII,S$GLB,, | Performed by: UROLOGY

## 2024-02-27 PROCEDURE — 1126F AMNT PAIN NOTED NONE PRSNT: CPT | Mod: CPTII,S$GLB,, | Performed by: UROLOGY

## 2024-02-27 PROCEDURE — 99214 OFFICE O/P EST MOD 30 MIN: CPT | Mod: S$GLB,,, | Performed by: UROLOGY

## 2024-02-27 PROCEDURE — 81001 URINALYSIS AUTO W/SCOPE: CPT | Mod: HCNC | Performed by: UROLOGY

## 2024-02-27 PROCEDURE — 1159F MED LIST DOCD IN RCRD: CPT | Mod: CPTII,S$GLB,, | Performed by: UROLOGY

## 2024-02-27 NOTE — PROGRESS NOTES
[facility] Department of Urology        New Overactive Bladder (OAB) Note     2024     Referred by:  Self, Aaareferral     HPI: Aleksandra Santana is a very pleasant 73 y.o. female who is a return patient to our department referred for evaluation of urinary incontinence of 4-6 years duration. She has noted improvement in her symptoms without starting medication.      She reports symptoms of irritative voiding including frequency, incontinence and urgency. She reports symptoms of obstructive voiding including intermittency and post void dribbling. Bladder scan PVR was 0 mL. Her history includes prior pelvic surgery ( , endometriosis ablation). She denies all other prior pelvic surgeries or neurologic diagnoses. She does not report symptoms suggestive of advanced POP. She reports a history of constipation, IBS, takes a daily laxative. She denies a history suggestive of glaucoma.      She reports a recent sUTI. Included in her symptoms was gross hematuria, which she reports is typical for her with infections. Last sUTI was several years ago. Has taken antibiotics for recent sinus infections.      Previous history of endometriosis. 2 C-sections 40 years ago.     Previous incontinence therapies:  Behavioral Therapy: (fluid/dietary modification) -  provided some but insufficient benefit     A review of 10+ systems was conducted with pertinent positive and negative findings documented in HPI with all other systems reviewed and negative.     Past medical, family, surgical and social history reviewed as documented in chart with pertinent positive medical, family, surgical and social history detailed in HPI.     Exam Findings:     Gen: no acute distress, conversant  Eyes: anicteric, extraocular muscles intact  Lungs: normal inspiration, no retractions  GI: soft, non-tender, no distension  Psych: appropriate affect, alert and oriented      Assessment/Plan:     Urinary Tract Infection With Hematuria (new, addt'l workup):  Will check urine with microscopy today. If no significant MSH, don't plan on workup. If present, would evaluate for MSH/GH. Recommendation for D-mannose given recent antibiotic use and risk of recurrent sUTI.

## 2024-02-28 ENCOUNTER — PATIENT MESSAGE (OUTPATIENT)
Dept: UROLOGY | Facility: CLINIC | Age: 74
End: 2024-02-28
Payer: MEDICARE

## 2024-02-28 LAB
BACTERIA #/AREA URNS AUTO: NORMAL /HPF
MICROSCOPIC COMMENT: NORMAL
RBC #/AREA URNS AUTO: 2 /HPF (ref 0–4)
SQUAMOUS #/AREA URNS AUTO: 1 /HPF
WBC #/AREA URNS AUTO: 0 /HPF (ref 0–5)

## 2024-03-25 ENCOUNTER — PATIENT MESSAGE (OUTPATIENT)
Dept: FAMILY MEDICINE | Facility: CLINIC | Age: 74
End: 2024-03-25
Payer: MEDICARE

## 2024-03-26 NOTE — TELEPHONE ENCOUNTER
Please advise,    I take augmentin  amox/clav 875/125     Spoke to  you before whenever I get a sinus infection its starts with allergy sinus then I get a migraine headache where I cant pick my head up. dizzy and vomiting. I have what I need to take for most of that   Butal nausea  Rx meclizine   As  addressed on recent visit    but I need the antibiotic to get it to go away, I was going to go to the ENT today, but I could not    if I could have a prescription for this even if I have to follow up to come see you after I feel better thats fine.   Thanks nilay fisher

## 2024-04-17 ENCOUNTER — PATIENT MESSAGE (OUTPATIENT)
Dept: FAMILY MEDICINE | Facility: CLINIC | Age: 74
End: 2024-04-17
Payer: MEDICARE

## 2024-04-17 RX ORDER — SUMATRIPTAN 50 MG/1
50 TABLET, FILM COATED ORAL
Qty: 10 TABLET | Refills: 3 | Status: SHIPPED | OUTPATIENT
Start: 2024-04-17 | End: 2024-05-17

## 2024-04-17 NOTE — TELEPHONE ENCOUNTER
Care Due:                  Date            Visit Type   Department     Provider  --------------------------------------------------------------------------------                                [facility]                              PRIMARY      MED/ INTERNAL  Julian Schulz  Last Visit: 12-      CARE ([facility])   MED/ PEDS      Ehrensing  Next Visit: None Scheduled  None         None Found                                                            Last  Test          Frequency    Reason                     Performed    Due Date  --------------------------------------------------------------------------------    Mg Level....  12 months..  ibandronate..............  Not Found    Overdue    Phosphate...  12 months..  ibandronate..............  Not Found    Overdue    [employer] Embedded Care Due Messages. Reference number: [id number].   4/17/2024 3:46:47 PM CDT

## 2024-05-10 ENCOUNTER — OFFICE VISIT (OUTPATIENT)
Dept: OPTOMETRY | Facility: CLINIC | Age: 74
End: 2024-05-10
Payer: MEDICARE

## 2024-05-10 ENCOUNTER — CLINICAL SUPPORT (OUTPATIENT)
Dept: OPHTHALMOLOGY | Facility: CLINIC | Age: 74
End: 2024-05-10
Payer: MEDICARE

## 2024-05-10 DIAGNOSIS — H25.13 NUCLEAR SCLEROSIS OF BOTH EYES: ICD-10-CM

## 2024-05-10 DIAGNOSIS — H40.1131 PRIMARY OPEN ANGLE GLAUCOMA (POAG) OF BOTH EYES, MILD STAGE: Primary | ICD-10-CM

## 2024-05-10 DIAGNOSIS — H40.1131 PRIMARY OPEN ANGLE GLAUCOMA (POAG) OF BOTH EYES, MILD STAGE: ICD-10-CM

## 2024-05-10 PROCEDURE — 1159F MED LIST DOCD IN RCRD: CPT | Mod: HCNC,CPTII,S$GLB, | Performed by: OPTOMETRIST

## 2024-05-10 PROCEDURE — 1160F RVW MEDS BY RX/DR IN RCRD: CPT | Mod: HCNC,CPTII,S$GLB, | Performed by: OPTOMETRIST

## 2024-05-10 PROCEDURE — 99214 OFFICE O/P EST MOD 30 MIN: CPT | Mod: HCNC,S$GLB,, | Performed by: OPTOMETRIST

## 2024-05-10 PROCEDURE — 99999 PR PBB SHADOW E&M-EST. PATIENT-LVL III: CPT | Mod: PBBFAC,HCNC,, | Performed by: OPTOMETRIST

## 2024-05-10 PROCEDURE — 1126F AMNT PAIN NOTED NONE PRSNT: CPT | Mod: HCNC,CPTII,S$GLB, | Performed by: OPTOMETRIST

## 2024-05-10 NOTE — PROGRESS NOTES
Subjective:       Patient ID: Aleksandra Santana is a 73 y.o. female      Chief Complaint   Patient presents with    Concerns About Ocular Health    Glaucoma     History of Present Illness   Dls: 2/6/24 Dr. Amezcua     74 y/o female presents today for hvf and iop ck   Pt states no changes since last visit.   Pt doing well with gtts forgot couple times to use gtts.     Eye meds  Latanoprost Q HS OU       Assessment/Plan:     1. Primary open angle glaucoma (POAG) of both eyes, mild stage  IOP much improved with latanoprost QHS OU. OCT last visit with RNFL thinning OD >> OS. HVF today with superior defect OD, normal OS. Continue latanoprost QHS OU. 6 month IOP check, sooner PRN.     2. Nuclear sclerosis of both eyes  Possible phacomorphic component with narrow angles. Pt wants to try MRx first for vision. May need to refer for CEIOL if IOP not maintained.      Today's visit is associated with current and anticipated ongoing medical care related to this patient's single serious/complex condition (glaucoma). Follow up is to be continued indefinitely to monitor the condition.       Follow up in about 6 months (around 11/10/2024) for IOP check, Cataract check.

## 2024-05-10 NOTE — PROGRESS NOTES
Visual field test done.  Patient stated no latex allergies used coverlet        Axis Add      Right +1.50 +0.75 015 +2.50   Left +2.00 +0.75 160 +2.50

## 2024-07-31 RX ORDER — FOLIC ACID 1 MG/1
TABLET ORAL
Qty: 90 TABLET | Refills: 12 | Status: SHIPPED | OUTPATIENT
Start: 2024-07-31

## 2024-08-22 ENCOUNTER — PATIENT OUTREACH (OUTPATIENT)
Dept: ADMINISTRATIVE | Facility: HOSPITAL | Age: 74
End: 2024-08-22
Payer: MEDICARE

## 2024-08-23 ENCOUNTER — PATIENT MESSAGE (OUTPATIENT)
Dept: FAMILY MEDICINE | Facility: CLINIC | Age: 74
End: 2024-08-23
Payer: MEDICARE

## 2024-09-05 ENCOUNTER — HOSPITAL ENCOUNTER (EMERGENCY)
Facility: HOSPITAL | Age: 74
Discharge: HOME | End: 2024-09-05
Attending: EMERGENCY MEDICINE
Payer: MEDICARE

## 2024-09-05 DIAGNOSIS — T14.8XXA WOUND INFECTION: Primary | ICD-10-CM

## 2024-09-05 DIAGNOSIS — L08.9 WOUND INFECTION: Primary | ICD-10-CM

## 2024-09-05 DIAGNOSIS — S99.929A FOOT INJURY: ICD-10-CM

## 2024-09-05 PROCEDURE — 99284 EMERGENCY DEPT VISIT MOD MDM: CPT | Mod: 25,HCNC,ER

## 2024-09-05 PROCEDURE — 25000003 PHARM REV CODE 250: Mod: HCNC,ER

## 2024-09-05 RX ORDER — SULFAMETHOXAZOLE AND TRIMETHOPRIM 800; 160 MG/1; MG/1
1 TABLET ORAL
Status: COMPLETED | OUTPATIENT
Start: 2024-09-05 | End: 2024-09-05

## 2024-09-05 RX ORDER — MUPIROCIN 20 MG/G
OINTMENT TOPICAL
Status: COMPLETED | OUTPATIENT
Start: 2024-09-05 | End: 2024-09-05

## 2024-09-05 RX ORDER — SULFAMETHOXAZOLE AND TRIMETHOPRIM 800; 160 MG/1; MG/1
1 TABLET ORAL 2 TIMES DAILY
Qty: 14 TABLET | Refills: 0 | Status: SHIPPED | OUTPATIENT
Start: 2024-09-05 | End: 2024-09-12

## 2024-09-05 RX ORDER — MUPIROCIN 20 MG/G
OINTMENT TOPICAL 3 TIMES DAILY
Qty: 1 G | Refills: 0 | Status: SHIPPED | OUTPATIENT
Start: 2024-09-05

## 2024-09-05 RX ORDER — ACETAMINOPHEN 500 MG
500 TABLET ORAL
Status: COMPLETED | OUTPATIENT
Start: 2024-09-05 | End: 2024-09-05

## 2024-09-05 RX ADMIN — MUPIROCIN: 20 OINTMENT TOPICAL at 10:09

## 2024-09-05 RX ADMIN — SULFAMETHOXAZOLE AND TRIMETHOPRIM 1 TABLET: 800; 160 TABLET ORAL at 09:09

## 2024-09-05 RX ADMIN — ACETAMINOPHEN 500 MG: 500 TABLET ORAL at 09:09

## 2024-09-06 VITALS
BODY MASS INDEX: 25.68 KG/M2 | HEART RATE: 59 BPM | OXYGEN SATURATION: 97 % | WEIGHT: 136 LBS | HEIGHT: 61 IN | SYSTOLIC BLOOD PRESSURE: 155 MMHG | TEMPERATURE: 98 F | DIASTOLIC BLOOD PRESSURE: 69 MMHG | RESPIRATION RATE: 59 BRPM

## 2024-09-06 NOTE — DISCHARGE INSTRUCTIONS

## 2024-09-06 NOTE — ED PROVIDER NOTES
Encounter Date: 2024       History     Chief Complaint   Patient presents with    Foot Pain     Pt reports concern for infection on her right foot after a brick fell on it and caused a cut to the top of the foot approx 3 days ago. Pt reports worsening erythema to the area that is cut. Unable to visualize due to dressing in triage.      74-year-old female with past medical history of anxiety, GERD, hypertension, hypothyroidism, IBS, osteopenia, osteoporosis presents to ED for emergent evaluation of acute onset right foot injury after a brick accidentally fell onto her foot 3 days ago.  She reports noticing a wound to her foot after the incident.  She states she has been applying antibiotic ointment with no relief.  She denies any associated drainage, bleeding.  She denies any fever, chills, chest pain, shortness of breath, abdominal pain, nausea, vomiting, diarrhea, dysuria, hematuria.  She denies any head trauma, fall, LOC. her tetanus is up-to-date.  No other symptoms reported.    The history is provided by the patient. No  was used.     Review of patient's allergies indicates:   Allergen Reactions    Codeine     Iodine and iodide containing products     Iodinated contrast media      Rash (skin)^  Rash (skin)^    Mobic [meloxicam]     Clindamycin Rash    Doxycycline Rash     Past Medical History:   Diagnosis Date    Abnormal EKG     Allergy     seasonal    Anxiety     Breast cyst     Cataract     Fibrocystic breast     GERD (gastroesophageal reflux disease)     Glaucoma     History of colonic polyps     1 polyp  --5 yrs.    Hypertension     Hypothyroidism     IBS (irritable bowel syndrome)     Keloid cicatrix     Migraine syndrome     OA (osteoarthritis)     Osteopenia     BMD  --no tx -repeat 2 yrs    Osteoporosis, postmenopausal     Palpitations     SOB (shortness of breath)      Past Surgical History:   Procedure Laterality Date    APPENDECTOMY       SECTION      x2     HYSTERECTOMY  age 33    COLEEN/BSO for endometriosis    OOPHORECTOMY      TONSILLECTOMY       Family History   Problem Relation Name Age of Onset    Cataracts Mother      No Known Problems Father      Ovarian cancer Sister      No Known Problems Brother      No Known Problems Maternal Aunt      No Known Problems Maternal Uncle      No Known Problems Paternal Aunt      No Known Problems Paternal Uncle      No Known Problems Maternal Grandmother      No Known Problems Maternal Grandfather      No Known Problems Paternal Grandmother      No Known Problems Paternal Grandfather      No Known Problems Other      Colon cancer Neg Hx      Breast cancer Neg Hx      Amblyopia Neg Hx      Blindness Neg Hx      Cancer Neg Hx      Diabetes Neg Hx      Glaucoma Neg Hx      Hypertension Neg Hx      Macular degeneration Neg Hx      Retinal detachment Neg Hx      Strabismus Neg Hx      Stroke Neg Hx      Thyroid disease Neg Hx      Melanoma Neg Hx       Social History     Tobacco Use    Smoking status: Never    Smokeless tobacco: Never   Substance Use Topics    Alcohol use: Yes     Alcohol/week: 1.0 standard drink of alcohol     Types: 1 Glasses of wine per week     Comment: ocasionally    Drug use: No     Review of Systems   Constitutional:  Negative for chills and fever.   HENT:  Negative for congestion, ear pain, rhinorrhea and sore throat.    Eyes:  Negative for redness.   Respiratory:  Negative for cough and shortness of breath.    Cardiovascular:  Negative for chest pain.   Gastrointestinal:  Negative for abdominal pain, diarrhea, nausea and vomiting.   Genitourinary:  Negative for decreased urine volume, difficulty urinating, dysuria, frequency, hematuria and urgency.   Musculoskeletal:  Positive for myalgias. Negative for back pain and neck pain.   Skin:  Positive for wound. Negative for rash.   Neurological:  Negative for headaches.        (-) head trauma  (-) LOC   Psychiatric/Behavioral:  Negative for confusion.         Physical Exam     Initial Vitals [09/05/24 1947]   BP Pulse Resp Temp SpO2   (!) 163/72 70 18 98.1 °F (36.7 °C) 100 %      MAP       --         Physical Exam    Nursing note and vitals reviewed.  Constitutional: She appears well-developed and well-nourished.  Non-toxic appearance. She does not appear ill.   HENT:   Head: Normocephalic and atraumatic.   Right Ear: Hearing, tympanic membrane, external ear and ear canal normal. Tympanic membrane is not perforated, not erythematous and not bulging.   Left Ear: Hearing, tympanic membrane, external ear and ear canal normal. Tympanic membrane is not perforated, not erythematous and not bulging.   Nose: Nose normal.   Mouth/Throat: Uvula is midline, oropharynx is clear and moist and mucous membranes are normal.   Eyes: Conjunctivae and EOM are normal.   Neck: Neck supple.   Normal range of motion.   Full passive range of motion without pain.     Cardiovascular:  Normal rate and regular rhythm.           Pulses:       Radial pulses are 2+ on the right side and 2+ on the left side.        Dorsalis pedis pulses are 2+ on the right side and 2+ on the left side.   Pulmonary/Chest: Effort normal and breath sounds normal. No accessory muscle usage. No respiratory distress. She has no decreased breath sounds.   Abdominal: Abdomen is soft. Bowel sounds are normal. She exhibits no distension. There is no abdominal tenderness. There is no rebound and no guarding.   Musculoskeletal:         General: Normal range of motion.      Cervical back: Full passive range of motion without pain, normal range of motion and neck supple. No rigidity.      Comments: Abrasion noted to anterior right foot.  There is some mild surrounding erythema.  No associated bleeding or drainage.  Full range motion of bilateral toes, ankles, knees, hips.  Strength and sensation intact to bilateral lower extremities.  Patient able to ambulate into the room.      Neurological: She is alert. No cranial nerve  deficit.   Neuro intact.  Strength and sensation intact bilateral upper and lower extremities.   Skin: Skin is warm and dry.   Psychiatric: She has a normal mood and affect.         ED Course   Procedures  Labs Reviewed - No data to display       Imaging Results              X-Ray Foot Complete Right (Final result)  Result time 09/05/24 21:36:03      Final result by Sami Ross MD (09/05/24 21:36:03)                   Impression:      No acute osseous abnormality identified.      Electronically signed by: Sami Ross MD  Date:    09/05/2024  Time:    21:36               Narrative:    EXAMINATION:  XR FOOT COMPLETE 3 VIEW RIGHT    CLINICAL HISTORY:  . Unspecified injury of unspecified foot, initial encounter    TECHNIQUE:  AP, lateral, and oblique views of the right foot were performed.    COMPARISON:  None    FINDINGS:  No evidence of acute displaced fracture, dislocation, or osseous destructive process.  Minor degenerative spurring is seen in the midfoot.  No radiopaque retained foreign body seen.                                       Medications   mupirocin 2 % ointment ( Topical (Top) Given 9/5/24 2205)   acetaminophen tablet 500 mg (500 mg Oral Given 9/5/24 2156)   sulfamethoxazole-trimethoprim 800-160mg per tablet 1 tablet (1 tablet Oral Given 9/5/24 2157)     Medical Decision Making  This is a 74-year-old female with past medical history of anxiety, GERD, hypertension, hypothyroidism, IBS, osteopenia, osteoporosis presents to ED for emergent evaluation of acute onset right foot injury after a brick accidentally fell onto her foot 3 days ago.  She reports noticing a wound to her foot after the incident.  She states she has been applying antibiotic ointment with no relief.  She denies any associated drainage, bleeding.  She denies any fever, chills, chest pain, shortness of breath, abdominal pain, nausea, vomiting, diarrhea, dysuria, hematuria.  She denies any head trauma, fall, LOC. her tetanus is  up-to-date.  No other symptoms reported.    On physical exam, patient is well-appearing and in no acute distress.  Nontoxic appearing.  Lungs are clear to auscultation bilaterally.  Abdomen is soft and nontender.  No guarding, rigidity, rebound.  2+ radial pulses bilaterally.  Posterior oropharynx is not erythematous.  No edema or exudate.  Uvula midline.  Bilateral tympanic membrane is normal.  No erythema, bulging, or perforations.  Neuro intact.  Strength and sensation intact bilateral upper and lower extremities.  Abrasion noted to anterior right foot.  There is some mild surrounding erythema.  No associated bleeding or drainage.  Full range motion of bilateral toes, ankles, knees, hips.  Strength and sensation intact to bilateral lower extremities.  Patient able to ambulate into the room.  2+ DP pulses bilaterally.  X-ray of right foot revealed no acute osseous abnormality.  Wound was irrigated well and cleansed.  Bactroban ointment applied.  Bandage applied.  Tylenol and Bactrim ordered.  Will discharge patient on Bactrim and Bactroban ointment.  Advised patient to present to an ER after 48 hours of antibiotics for any new or worsening symptoms such as worsening redness, swelling, fever.  Urged prompt follow-up with PCP for further evaluation.    Strict return precautions given. I discussed with the patient/family the diagnosis, treatment plan, indications for return to the emergency department, and for expected follow-up. The patient/family verbalized an understanding. The patient/family is asked if there are any questions or concerns. We discuss the case, until all issues are addressed to the patient/family's satisfaction. Patient/family understands and is agreeable to the plan. Patient is stable and ready for discharge.      Amount and/or Complexity of Data Reviewed  Radiology: ordered.    Risk  OTC drugs.  Prescription drug management.                                      Clinical Impression:  Final  diagnoses:  [S99.929A] Foot injury  [T14.8XXA, L08.9] Wound infection (Primary)          ED Disposition Condition    Discharge Stable          ED Prescriptions       Medication Sig Dispense Start Date End Date Auth. Provider    sulfamethoxazole-trimethoprim 800-160mg (BACTRIM DS) 800-160 mg Tab Take 1 tablet by mouth 2 (two) times daily. for 7 days 14 tablet 9/5/2024 9/12/2024 Briana Sue PA-C    mupirocin (BACTROBAN) 2 % ointment Apply topically 3 (three) times daily. 1 g 9/5/2024 -- Briana Sue PA-C          Follow-up Information       Follow up With Specialties Details Why Contact Info    Julian Lee MD Internal Medicine In 2 days for further evaluation [address] [facility] [address]  [phone]      [facility] ED Emergency Medicine In 2 days If symptoms worsen [address] [address] [phone]             Briana Sue PA-C  09/05/24 [id number]

## 2024-09-29 DIAGNOSIS — H40.1131 PRIMARY OPEN ANGLE GLAUCOMA (POAG) OF BOTH EYES, MILD STAGE: ICD-10-CM

## 2024-09-30 RX ORDER — LATANOPROST 50 UG/ML
1 SOLUTION/ DROPS OPHTHALMIC
Qty: 7.5 ML | Refills: 0 | Status: SHIPPED | OUTPATIENT
Start: 2024-09-30

## 2024-10-04 ENCOUNTER — PATIENT MESSAGE (OUTPATIENT)
Dept: FAMILY MEDICINE | Facility: CLINIC | Age: 74
End: 2024-10-04
Payer: MEDICARE

## 2024-10-04 RX ORDER — BUTALBITAL, ACETAMINOPHEN AND CAFFEINE 50; 325; 40 MG/1; MG/1; MG/1
1 TABLET ORAL EVERY 4 HOURS PRN
Qty: 30 TABLET | Refills: 3 | Status: SHIPPED | OUTPATIENT
Start: 2024-10-04

## 2024-10-04 NOTE — TELEPHONE ENCOUNTER
Care Due:                  Date            Visit Type   Department     Provider  --------------------------------------------------------------------------------                                EP [facility]                              PRIMARY      MED/ INTERNAL  Julian Schulz  Last Visit: 12-      CARE ([facility])   MED/ PEDS      Ehrensing  Next Visit: None Scheduled  None         None Found                                                            Last  Test          Frequency    Reason                     Performed    Due Date  --------------------------------------------------------------------------------    Office Visit  12 months..  ibandronate..............  12- 11-    CMP.........  12 months..  hydroCHLOROthiazide,       09- 09-                             ibandronate, olmesartan..    Mg Level....  12 months..  ibandronate..............  Not Found    Overdue    Phosphate...  12 months..  ibandronate..............  Not Found    Overdue    TSH.........  12 months..  levothyroxine............  09-   09-    [facility] Embedded Care Due Messages. Reference number: [id number].   10/04/2024 3:09:30 PM CDT

## 2024-10-22 ENCOUNTER — OFFICE VISIT (OUTPATIENT)
Dept: URGENT CARE | Facility: CLINIC | Age: 74
End: 2024-10-22
Payer: MEDICARE

## 2024-10-22 ENCOUNTER — HOSPITAL ENCOUNTER (EMERGENCY)
Facility: HOSPITAL | Age: 74
Discharge: HOME | End: 2024-10-23
Attending: EMERGENCY MEDICINE
Payer: MEDICARE

## 2024-10-22 VITALS
OXYGEN SATURATION: 98 % | DIASTOLIC BLOOD PRESSURE: 73 MMHG | HEIGHT: 61 IN | SYSTOLIC BLOOD PRESSURE: 143 MMHG | HEART RATE: 67 BPM | RESPIRATION RATE: 20 BRPM | TEMPERATURE: 98 F | BODY MASS INDEX: 26.76 KG/M2 | WEIGHT: 141.75 LBS

## 2024-10-22 DIAGNOSIS — E87.6 HYPOKALEMIA: ICD-10-CM

## 2024-10-22 DIAGNOSIS — R94.31 ABNORMAL EKG: ICD-10-CM

## 2024-10-22 DIAGNOSIS — L03.211 CELLULITIS OF FACE: ICD-10-CM

## 2024-10-22 DIAGNOSIS — H10.9 CONJUNCTIVITIS OF RIGHT EYE, UNSPECIFIED CONJUNCTIVITIS TYPE: ICD-10-CM

## 2024-10-22 DIAGNOSIS — R07.89 OTHER CHEST PAIN: Primary | ICD-10-CM

## 2024-10-22 DIAGNOSIS — R07.9 CHEST PAIN: ICD-10-CM

## 2024-10-22 DIAGNOSIS — L53.9 FACIAL ERYTHEMA: Primary | ICD-10-CM

## 2024-10-22 DIAGNOSIS — H00.012 HORDEOLUM EXTERNUM OF RIGHT LOWER EYELID: ICD-10-CM

## 2024-10-22 DIAGNOSIS — R07.9 CHEST PAIN, UNSPECIFIED TYPE: ICD-10-CM

## 2024-10-22 LAB
ALBUMIN SERPL BCP-MCNC: 4.1 G/DL (ref 3.5–5.2)
ALP SERPL-CCNC: 44 U/L (ref 40–150)
ALT SERPL W/O P-5'-P-CCNC: 13 U/L (ref 10–44)
ANION GAP SERPL CALC-SCNC: 12 MMOL/L (ref 8–16)
AST SERPL-CCNC: 15 U/L (ref 10–40)
BASOPHILS # BLD AUTO: 0.06 K/UL (ref 0–0.2)
BASOPHILS NFR BLD: 0.7 % (ref 0–1.9)
BILIRUB SERPL-MCNC: 0.4 MG/DL (ref 0.1–1)
BNP SERPL-MCNC: 89 PG/ML (ref 0–99)
BUN SERPL-MCNC: 19 MG/DL (ref 8–23)
CALCIUM SERPL-MCNC: 10.2 MG/DL (ref 8.7–10.5)
CHLORIDE SERPL-SCNC: 100 MMOL/L (ref 95–110)
CO2 SERPL-SCNC: 26 MMOL/L (ref 23–29)
CREAT SERPL-MCNC: 0.8 MG/DL (ref 0.5–1.4)
D DIMER PPP IA.FEU-MCNC: 0.38 MG/L FEU
DIFFERENTIAL METHOD BLD: ABNORMAL
EOSINOPHIL # BLD AUTO: 0.1 K/UL (ref 0–0.5)
EOSINOPHIL NFR BLD: 1.7 % (ref 0–8)
ERYTHROCYTE [DISTWIDTH] IN BLOOD BY AUTOMATED COUNT: 12.8 % (ref 11.5–14.5)
EST. GFR  (NO RACE VARIABLE): >60 ML/MIN/1.73 M^2
GLUCOSE SERPL-MCNC: 108 MG/DL (ref 70–110)
HCT VFR BLD AUTO: 40.3 % (ref 37–48.5)
HGB BLD-MCNC: 13.8 G/DL (ref 12–16)
IMM GRANULOCYTES # BLD AUTO: 0.01 K/UL (ref 0–0.04)
IMM GRANULOCYTES NFR BLD AUTO: 0.1 % (ref 0–0.5)
LYMPHOCYTES # BLD AUTO: 1.9 K/UL (ref 1–4.8)
LYMPHOCYTES NFR BLD: 23.1 % (ref 18–48)
MCH RBC QN AUTO: 33.2 PG (ref 27–31)
MCHC RBC AUTO-ENTMCNC: 34.2 G/DL (ref 32–36)
MCV RBC AUTO: 97 FL (ref 82–98)
MONOCYTES # BLD AUTO: 0.6 K/UL (ref 0.3–1)
MONOCYTES NFR BLD: 7.2 % (ref 4–15)
NEUTROPHILS # BLD AUTO: 5.4 K/UL (ref 1.8–7.7)
NEUTROPHILS NFR BLD: 67.2 % (ref 38–73)
NRBC BLD-RTO: 0 /100 WBC
PLATELET # BLD AUTO: 308 K/UL (ref 150–450)
PMV BLD AUTO: 9.4 FL (ref 9.2–12.9)
POTASSIUM SERPL-SCNC: 3.2 MMOL/L (ref 3.5–5.1)
PROT SERPL-MCNC: 8 G/DL (ref 6–8.4)
RBC # BLD AUTO: 4.16 M/UL (ref 4–5.4)
SODIUM SERPL-SCNC: 138 MMOL/L (ref 136–145)
TROPONIN I SERPL DL<=0.01 NG/ML-MCNC: <0.006 NG/ML (ref 0–0.03)
WBC # BLD AUTO: 8.09 K/UL (ref 3.9–12.7)

## 2024-10-22 PROCEDURE — 83880 ASSAY OF NATRIURETIC PEPTIDE: CPT | Mod: HCNC | Performed by: EMERGENCY MEDICINE

## 2024-10-22 PROCEDURE — 99214 OFFICE O/P EST MOD 30 MIN: CPT | Mod: S$GLB,,, | Performed by: FAMILY MEDICINE

## 2024-10-22 PROCEDURE — 25000003 PHARM REV CODE 250: Mod: HCNC | Performed by: EMERGENCY MEDICINE

## 2024-10-22 PROCEDURE — 93005 ELECTROCARDIOGRAM TRACING: CPT | Mod: HCNC

## 2024-10-22 PROCEDURE — 93010 ELECTROCARDIOGRAM REPORT: CPT | Mod: S$GLB,,, | Performed by: INTERNAL MEDICINE

## 2024-10-22 PROCEDURE — 80053 COMPREHEN METABOLIC PANEL: CPT | Mod: HCNC | Performed by: EMERGENCY MEDICINE

## 2024-10-22 PROCEDURE — 93005 ELECTROCARDIOGRAM TRACING: CPT | Mod: S$GLB,,, | Performed by: FAMILY MEDICINE

## 2024-10-22 PROCEDURE — 85379 FIBRIN DEGRADATION QUANT: CPT | Mod: HCNC | Performed by: EMERGENCY MEDICINE

## 2024-10-22 PROCEDURE — 85025 COMPLETE CBC W/AUTO DIFF WBC: CPT | Mod: HCNC | Performed by: EMERGENCY MEDICINE

## 2024-10-22 PROCEDURE — 84484 ASSAY OF TROPONIN QUANT: CPT | Mod: HCNC | Performed by: EMERGENCY MEDICINE

## 2024-10-22 PROCEDURE — 71046 X-RAY EXAM CHEST 2 VIEWS: CPT | Mod: S$GLB,,, | Performed by: RADIOLOGY

## 2024-10-22 PROCEDURE — 99284 EMERGENCY DEPT VISIT MOD MDM: CPT | Mod: 25,HCNC

## 2024-10-22 RX ORDER — ACETAMINOPHEN 325 MG/1
650 TABLET ORAL
Status: COMPLETED | OUTPATIENT
Start: 2024-10-22 | End: 2024-10-22

## 2024-10-22 RX ORDER — AMOXICILLIN AND CLAVULANATE POTASSIUM 875; 125 MG/1; MG/1
1 TABLET, FILM COATED ORAL 2 TIMES DAILY
Qty: 14 TABLET | Refills: 0 | Status: SHIPPED | OUTPATIENT
Start: 2024-10-22 | End: 2024-10-29

## 2024-10-22 RX ORDER — CETIRIZINE HYDROCHLORIDE 10 MG/1
10 TABLET ORAL
Status: COMPLETED | OUTPATIENT
Start: 2024-10-22 | End: 2024-10-22

## 2024-10-22 RX ORDER — ERYTHROMYCIN 5 MG/G
OINTMENT OPHTHALMIC EVERY 8 HOURS
Qty: 3.5 G | Refills: 0 | Status: SHIPPED | OUTPATIENT
Start: 2024-10-22 | End: 2024-10-27

## 2024-10-22 RX ADMIN — POTASSIUM BICARBONATE 40 MEQ: 391 TABLET, EFFERVESCENT ORAL at 11:10

## 2024-10-22 RX ADMIN — CETIRIZINE HYDROCHLORIDE 10 MG: 10 TABLET, FILM COATED ORAL at 11:10

## 2024-10-22 RX ADMIN — ACETAMINOPHEN 650 MG: 325 TABLET ORAL at 11:10

## 2024-10-22 NOTE — PROGRESS NOTES
"Subjective:      Patient ID: Aleksandra Santana is a 74 y.o. female.    Vitals:  height is 5' 1" (1.549 m) and weight is 64.3 kg (141 lb 12.1 oz). Her oral temperature is 97.6 °F (36.4 °C). Her blood pressure is 143/73 (abnormal) and her pulse is 67. Her respiration is 20 and oxygen saturation is 98%.     Chief Complaint: Eye Problem    Pt reports her right eye started to bother her, she felt like she was getting a stye, she says she started then getting redness, and redness around the eye. Denies vision changes, light sensitivity, headache, eye pressure. She says she had some crusting in her right eye, and feels itchy and gritty. She says she has environmental allergies, takes xyzal most days. She says she felt a bump near her right eye on Saturday, but never a "white head" she says she kept touching it, and then started to notice getting red in the face. Pt had stye and cellulitis in 2022, and says this feels the same. She says the eyelid tenderness improved.  She also mentions for the past couple days she is getting a "twinge" pain to right chest wall, says this might be pleurisy again. She says the pain radiates to her right upper back, lasts a few seconds. Denies worsening with breathing, coughing. Denies recent coughing, URI symptoms. She says she has been more fatigued lately, she says her face feels warm.  Leg swelling, history of DVT.  Denies any fever or chills. She does take estrogen daily.     Eye Problem   The right eye is affected. This is a new problem. Episode onset: 5 days ago. The problem occurs constantly. The problem has been gradually worsening. There was no injury mechanism. The patient is experiencing no pain. There is No known exposure to pink eye. She Does not wear contacts. Associated symptoms include an eye discharge, eye redness and itching. Pertinent negatives include no blurred vision, double vision, fever, foreign body sensation, nausea, photophobia, recent URI or vomiting. She has tried " nothing for the symptoms. The treatment provided no relief.       Constitution: Positive for fatigue. Negative for activity change, appetite change, chills, sweating and fever.   Cardiovascular:  Positive for chest pain. Negative for leg swelling, palpitations and sob on exertion.   Eyes:  Positive for eye discharge, eye itching, eye redness and eyelid swelling. Negative for eye trauma, foreign body in eye, eye pain, photophobia, vision loss, double vision and blurred vision.   Respiratory:  Negative for chest tightness, cough, sputum production, bloody sputum, COPD, shortness of breath, stridor, wheezing and asthma.    Gastrointestinal:  Negative for nausea and vomiting.   Skin:  Positive for erythema (right cheek, periorbital.).   Allergic/Immunologic: Negative for asthma.      Objective:     Physical Exam   Constitutional: She is oriented to person, place, and time. She appears well-developed.  Non-toxic appearance. She does not appear ill. No distress.   HENT:   Head: Normocephalic and atraumatic.   Ears:   Right Ear: External ear normal.   Left Ear: External ear normal.   Nose: Nose normal.   Mouth/Throat: Oropharynx is clear and moist.   Eyes: EOM and lids are normal. Pupils are equal, round, and reactive to light. Right eye exhibits discharge. Right eye exhibits no chemosis and no hordeolum. No foreign body present in the right eye. Left eye exhibits no chemosis, no discharge and no hordeolum. No foreign body present in the left eye. Right conjunctiva is injected. Left conjunctiva is not injected. Extraocular movement intact vision grossly intact      Comments: EOMI intact  Mild redness to right cheek, no obvious stye noted.    Neck: Trachea normal and phonation normal. Neck supple.   Cardiovascular:   Murmur (faint) heard.  Pulmonary/Chest: Effort normal and breath sounds normal.   Musculoskeletal: Normal range of motion.         General: Normal range of motion.   Neurological: She is alert and oriented to  person, place, and time.   Skin: Skin is warm, dry, intact and not diaphoretic. erythema (right cheek, periorbital.)   Psychiatric: Her speech is normal and behavior is normal. Judgment and thought content normal.   Nursing note and vitals reviewed.    Vent rate=68 bpm  MI interval= 170 ms  QRS duration= 74 ms  QT/QTc  406/431 ms  P-R-T axes 76 67 65   Interpretation:  Normal sinus rhythm, ST depression in V4, V5, lead 3 AVF and V6.  Abnormal EKG.    Assessment:     1. Other chest pain    2. Abnormal EKG    3. Hordeolum externum of right lower eyelid    4. Cellulitis of face      Vision Screening    Right eye Left eye Both eyes   Without correction 20/40 20/70 20/40   With correction          Plan:     Atypical chest pain, patient with abnormal EKG, also PERC positive.  Reviewed with patient and Dr. Ward concern for ED evaluation, discussed with  advised on ED eval for possible PE work up and cardiac work up.  Advised patient and encouraged EMS, patient refusing advised on safety concerns.  Patient refused.  Patient reports she wants to go to [facility] emergency department, report called.  Pt left UC in stable condition to  ED.   Chest x-ray without focal opacity noted.  No pneumothorax noted.  Positive PERC    Other chest pain  -     Ambulatory referral/consult to Cardiology  -     IN OFFICE EKG 12-LEAD (to [place])  -     XR CHEST PA AND LATERAL; Future; Expected date: 10/22/2024  -     Refer to Emergency Dept.    Abnormal EKG  -     Ambulatory referral/consult to Cardiology  -     Refer to Emergency Dept.    Hordeolum externum of right lower eyelid  -     erythromycin (ROMYCIN) ophthalmic ointment; Place into the right eye every 8 (eight) hours. for 5 days  Dispense: 3.5 g; Refill: 0    Cellulitis of face  -     amoxicillin-clavulanate 875-125mg (AUGMENTIN) 875-125 mg per tablet; Take 1 tablet by mouth 2 (two) times daily. for 7 days  Dispense: 14 tablet; Refill: 0          Medical Decision Making:    History:   Old Medical Records: I decided to obtain old medical records.  Old Records Summarized: records from clinic visits and other records.  Clinical Tests:   Radiological Study: Ordered and Reviewed  Medical Tests: Ordered and Reviewed  Other:   I have discussed this case with another health care provider.    Additional MDM:   PERC Rule:   Age is greater than or equal to 50 = 1.0  Heart Rate is greater than or equal to 100 = 0.0  SaO2 on room air < 95% = 0.0  Unilateral leg swelling = 0.0  Hemoptysis = 0.0  Recent surgery or trauma = 0.0  Prior PE or DVT =  0.0  Hormone use = 1.0  PERC Score = 2        Well's Criteria Score:  -Clinical symptoms of DVT (leg swelling, pain with palpation) = 0.0  -PE is #1 diagnosis OR equally likely =            0.0  -Heart Rate >100 =   0.0  -Immobilization (= or > than 3 days) or surgery in the previous 4 weeks = 0.0  -Previous DVT/PE = 0.0  -Hemoptysis =          0.0  -Malignancy =           0.0  Well's Probability Score =    0        Heart Failure Score:   COPD = No

## 2024-10-22 NOTE — PATIENT INSTRUCTIONS
ER Referral   Your condition is serious and requires immediate attention and evaluation in an ER setting.  You were referred to [facility]       GO STRAIGHT TO THE ER AND DO NOT EAT OR DRINK ANYTHING UNLESS A HEALTHCARE PROVIDER GIVES IT TO YOU.

## 2024-10-23 VITALS
DIASTOLIC BLOOD PRESSURE: 77 MMHG | TEMPERATURE: 98 F | WEIGHT: 140 LBS | SYSTOLIC BLOOD PRESSURE: 149 MMHG | OXYGEN SATURATION: 98 % | BODY MASS INDEX: 26.43 KG/M2 | HEIGHT: 61 IN | HEART RATE: 69 BPM | RESPIRATION RATE: 20 BRPM

## 2024-10-23 LAB
OHS QRS DURATION: 74 MS
OHS QTC CALCULATION: 431 MS

## 2024-10-23 NOTE — PROGRESS NOTES
[facility] Plan of Care Note    Referral source: Urgent Care      Reason for consult: Chest Pain: The patient is a 75 yo with right sided chest pain radiating to her back off and on since yesterday. She is on estrogen therapy. I recommend that she be seen in the ED for possible PE or atypical cardiac chest pain.      Disposition recommended:  [facility]

## 2024-10-24 LAB
OHS QRS DURATION: 70 MS
OHS QTC CALCULATION: 447 MS

## 2024-10-25 ENCOUNTER — PATIENT MESSAGE (OUTPATIENT)
Dept: FAMILY MEDICINE | Facility: CLINIC | Age: 74
End: 2024-10-25
Payer: MEDICARE

## 2024-10-30 ENCOUNTER — OFFICE VISIT (OUTPATIENT)
Dept: FAMILY MEDICINE | Facility: CLINIC | Age: 74
End: 2024-10-30
Payer: MEDICARE

## 2024-10-30 ENCOUNTER — HOSPITAL ENCOUNTER (OUTPATIENT)
Dept: RADIOLOGY | Facility: HOSPITAL | Age: 74
Discharge: HOME | End: 2024-10-30
Attending: INTERNAL MEDICINE
Payer: MEDICARE

## 2024-10-30 VITALS
BODY MASS INDEX: 26.55 KG/M2 | HEART RATE: 67 BPM | SYSTOLIC BLOOD PRESSURE: 122 MMHG | WEIGHT: 140.63 LBS | HEIGHT: 61 IN | OXYGEN SATURATION: 98 % | TEMPERATURE: 98 F | DIASTOLIC BLOOD PRESSURE: 60 MMHG

## 2024-10-30 DIAGNOSIS — R94.31 ABNORMAL EKG: ICD-10-CM

## 2024-10-30 DIAGNOSIS — G43.909 MIGRAINE SYNDROME: ICD-10-CM

## 2024-10-30 DIAGNOSIS — S99.921A INJURY OF RIGHT FOOT, INITIAL ENCOUNTER: ICD-10-CM

## 2024-10-30 DIAGNOSIS — I10 ESSENTIAL HYPERTENSION: ICD-10-CM

## 2024-10-30 DIAGNOSIS — F39 MOOD DISORDER: ICD-10-CM

## 2024-10-30 DIAGNOSIS — E55.9 VITAMIN D DEFICIENCY DISEASE: ICD-10-CM

## 2024-10-30 DIAGNOSIS — L30.9 DERMATITIS: Primary | ICD-10-CM

## 2024-10-30 DIAGNOSIS — E03.9 HYPOTHYROIDISM, UNSPECIFIED TYPE: ICD-10-CM

## 2024-10-30 DIAGNOSIS — E87.6 HYPOKALEMIA: ICD-10-CM

## 2024-10-30 PROCEDURE — 73630 X-RAY EXAM OF FOOT: CPT | Mod: TC,HCNC,FY,PO,RT

## 2024-10-30 PROCEDURE — 1125F AMNT PAIN NOTED PAIN PRSNT: CPT | Mod: HCNC,CPTII,S$GLB, | Performed by: INTERNAL MEDICINE

## 2024-10-30 PROCEDURE — 73630 X-RAY EXAM OF FOOT: CPT | Mod: 26,HCNC,RT, | Performed by: RADIOLOGY

## 2024-10-30 PROCEDURE — 1159F MED LIST DOCD IN RCRD: CPT | Mod: HCNC,CPTII,S$GLB, | Performed by: INTERNAL MEDICINE

## 2024-10-30 PROCEDURE — 3074F SYST BP LT 130 MM HG: CPT | Mod: HCNC,CPTII,S$GLB, | Performed by: INTERNAL MEDICINE

## 2024-10-30 PROCEDURE — 99999 PR PBB SHADOW E&M-EST. PATIENT-LVL IV: CPT | Mod: PBBFAC,HCNC,, | Performed by: INTERNAL MEDICINE

## 2024-10-30 PROCEDURE — 99215 OFFICE O/P EST HI 40 MIN: CPT | Mod: HCNC,S$GLB,, | Performed by: INTERNAL MEDICINE

## 2024-10-30 PROCEDURE — 3288F FALL RISK ASSESSMENT DOCD: CPT | Mod: HCNC,CPTII,S$GLB, | Performed by: INTERNAL MEDICINE

## 2024-10-30 PROCEDURE — 1101F PT FALLS ASSESS-DOCD LE1/YR: CPT | Mod: HCNC,CPTII,S$GLB, | Performed by: INTERNAL MEDICINE

## 2024-10-30 PROCEDURE — 3078F DIAST BP <80 MM HG: CPT | Mod: HCNC,CPTII,S$GLB, | Performed by: INTERNAL MEDICINE

## 2024-10-30 RX ORDER — GABAPENTIN 100 MG/1
100 CAPSULE ORAL 2 TIMES DAILY
COMMUNITY
Start: 2024-09-19

## 2024-10-30 RX ORDER — ERYTHROMYCIN 5 MG/G
OINTMENT OPHTHALMIC
COMMUNITY
Start: 2024-10-22

## 2024-10-30 RX ORDER — GABAPENTIN 300 MG/1
300 CAPSULE ORAL NIGHTLY
COMMUNITY
Start: 2024-08-22

## 2024-10-30 RX ORDER — AZITHROMYCIN 250 MG/1
250 TABLET, FILM COATED ORAL
COMMUNITY
Start: 2024-10-28

## 2024-10-30 RX ORDER — CELECOXIB 100 MG/1
100 CAPSULE ORAL 2 TIMES DAILY PRN
COMMUNITY
Start: 2024-09-18

## 2024-11-01 ENCOUNTER — TELEPHONE (OUTPATIENT)
Dept: FAMILY MEDICINE | Facility: CLINIC | Age: 74
End: 2024-11-01
Payer: MEDICARE

## 2024-11-01 DIAGNOSIS — E55.9 VITAMIN D DEFICIENCY DISEASE: ICD-10-CM

## 2024-11-01 DIAGNOSIS — D64.9 ANEMIA, UNSPECIFIED TYPE: ICD-10-CM

## 2024-11-01 DIAGNOSIS — E03.9 HYPOTHYROIDISM, UNSPECIFIED TYPE: ICD-10-CM

## 2024-11-01 DIAGNOSIS — I10 ESSENTIAL HYPERTENSION: ICD-10-CM

## 2024-11-01 DIAGNOSIS — Z86.0100 HISTORY OF COLONIC POLYPS: ICD-10-CM

## 2024-11-01 DIAGNOSIS — Z12.31 ENCOUNTER FOR SCREENING MAMMOGRAM FOR BREAST CANCER: ICD-10-CM

## 2024-11-01 DIAGNOSIS — Z00.00 ROUTINE MEDICAL EXAM: ICD-10-CM

## 2024-11-01 RX ORDER — AMLODIPINE BESYLATE 5 MG/1
5 TABLET ORAL DAILY
Qty: 90 TABLET | Refills: 11 | Status: SHIPPED | OUTPATIENT
Start: 2024-11-01

## 2024-11-01 RX ORDER — LEVOTHYROXINE SODIUM 88 UG/1
88 TABLET ORAL DAILY
Qty: 90 TABLET | Refills: 3 | Status: SHIPPED | OUTPATIENT
Start: 2024-11-01

## 2024-11-01 RX ORDER — CARVEDILOL 25 MG/1
TABLET ORAL
Qty: 90 TABLET | Refills: 11 | Status: SHIPPED | OUTPATIENT
Start: 2024-11-01

## 2024-11-01 RX ORDER — HYDROCHLOROTHIAZIDE 25 MG/1
25 TABLET ORAL DAILY
Qty: 90 TABLET | Refills: 12 | Status: SHIPPED | OUTPATIENT
Start: 2024-11-01

## 2024-11-04 ENCOUNTER — TELEPHONE (OUTPATIENT)
Dept: FAMILY MEDICINE | Facility: CLINIC | Age: 74
End: 2024-11-04

## 2024-11-04 ENCOUNTER — E-VISIT (OUTPATIENT)
Dept: FAMILY MEDICINE | Facility: CLINIC | Age: 74
End: 2024-11-04
Payer: MEDICARE

## 2024-11-04 DIAGNOSIS — R30.0 DYSURIA: Primary | ICD-10-CM

## 2024-11-04 RX ORDER — NITROFURANTOIN 25; 75 MG/1; MG/1
100 CAPSULE ORAL 2 TIMES DAILY
Qty: 10 CAPSULE | Refills: 0 | Status: SHIPPED | OUTPATIENT
Start: 2024-11-04 | End: 2024-11-09

## 2024-11-04 NOTE — TELEPHONE ENCOUNTER
E-visit sent to pt to fill out. Explain to her that is need and she doesn't need to come in for a visit.      ----- Message from Sally sent at 11/4/2024 11:04 AM CST -----  Type:  Patient Returning Call      Name of who is calling:pt        What is request in detail:patient is requesting a call back in regards to lab work for urinalysis. Patient would like to have orders placed for possible UTI, and she did see blood in her urine. She would like to have it done today.        Can clinic reply by [facility]:        What number to call back if not in [facility]: [phone]

## 2024-11-04 NOTE — PROGRESS NOTES
Patient ID: Aleksandra Santana is a 74 y.o. female.    Chief Complaint: General Illness (Entered automatically based on patient selection in [employer].)    The patient initiated a request through [employer] on 11/4/2024 for evaluation and management with a chief complaint of General Illness (Entered automatically based on patient selection in [employer].)     I evaluated the questionnaire submission on 11/4.    [facility] Evisit Supergroup-Common Problems    11/4/2024 11:55 AM CST - Filed by Patient   What do you need help with? Urinary Symptoms   Do you agree to participate in an E-Visit? Yes   If you have any of the following symptoms, please present to your local emergency room or call 911:  I acknowledge   What is the main issue you would like addressed today? UTI. 11/2. 11/3. Burning upon urination, blood in urine, constant pressure after chills but no fever, tired, backache .  Blood lessened Gaurav 11/3 No visible blood in urine today, but still have pressure backache constant pelvic pressure.   What symptoms do you currently have? Pain while passing urine;  Change in urine appearance or smell   When did your symptoms first appear? 11/1/2024   List what you have done or taken to help your symptoms. All Ive done was Drink more Fluids, Tylenol, rest stay off my feet   Please indicate whether you have had the following symptoms during the past 24 hours     Urgent urination (a sudden and uncontrollable urge to urinate) Moderate   Frequent urination of small amounts of urine (going to the toilet very often) Moderate   Burning pain when urinating Moderate   Incomplete bladder emptying (still feel like you need to urinate again after urination) Moderate   Pain not associated with urination in the lower abdomen below the belly button) None   What does your urine look like? Cloudy   Blood seen in the urine Mild   Flank pain (pain in one or both sides of the lower back) Mild   Abnormal Vaginal Discharge (abnormal amount, color  and/or odor) None   Discharge from the urethra (urinary opening) without urination None   High body temperature/fever? None-<99.5   Please rate how much discomfort you have experience because of the symptoms in the past 24 hours: Moderate   Please indicate how the symptoms have interfered with your every day activities/work in the past 24 hours: Moderate   Please indicate how these symptoms have interfered with your social activities (visiting people, meeting with friends, etc.) in the past 24 hours? Moderate   Are you a diabetic? No   Please indicate whether you have the following at the time of completion of this questionnaire: Signs of menopause syndrome (hot flashes)   Provide any additional information you feel is important. I started feeling better last night and this morning but now symptoms are increasing again   Please attach any relevant images or files (if you have performed a home test for UTI, please submit a photo of results)    Are you able to take your vital signs? Yes   Systolic Blood Pressure: 162   Diastolic Blood Pressure: 88   Weight: 140   Height: 61   Pulse: 72   Temperature: 97.4   Respiration rate:    Pulse Oxygen:          Encounter Diagnosis   Name Primary?    Dysuria Yes        No orders of the defined types were placed in this encounter.     Medications Ordered This Encounter   Medications    nitrofurantoin, macrocrystal-monohydrate, (MACROBID) 100 MG capsule     Sig: Take 1 capsule (100 mg total) by mouth 2 (two) times daily. for 5 days     Dispense:  10 capsule     Refill:  0        No follow-ups on file.      E-Visit Time Tracking:  Over 21 minutes reviewing chart, prior urinalysis and cultures for which I do not see any positive cultures to suggest any resistance pattern.  Symptom questionnaire reviewed and all symptoms consistent with a UTI.  Prescription sent for this acute medical problem.  Expect patient to message back.

## 2024-11-08 ENCOUNTER — PATIENT MESSAGE (OUTPATIENT)
Dept: FAMILY MEDICINE | Facility: CLINIC | Age: 74
End: 2024-11-08
Payer: MEDICARE

## 2024-11-14 ENCOUNTER — TELEPHONE (OUTPATIENT)
Dept: FAMILY MEDICINE | Facility: CLINIC | Age: 74
End: 2024-11-14
Payer: MEDICARE

## 2024-11-14 NOTE — TELEPHONE ENCOUNTER
I left a voice mail reminder for the patient in regards to an upcoming appointment on 11/15/24 with Dr. Lee.

## 2024-11-15 ENCOUNTER — OFFICE VISIT (OUTPATIENT)
Dept: FAMILY MEDICINE | Facility: CLINIC | Age: 74
End: 2024-11-15
Payer: MEDICARE

## 2024-11-15 VITALS
HEIGHT: 61 IN | OXYGEN SATURATION: 99 % | TEMPERATURE: 99 F | DIASTOLIC BLOOD PRESSURE: 68 MMHG | HEART RATE: 64 BPM | SYSTOLIC BLOOD PRESSURE: 128 MMHG | WEIGHT: 140.19 LBS | BODY MASS INDEX: 26.47 KG/M2

## 2024-11-15 DIAGNOSIS — M81.0 OSTEOPOROSIS, POSTMENOPAUSAL: ICD-10-CM

## 2024-11-15 DIAGNOSIS — Z86.0100 HISTORY OF COLONIC POLYPS: ICD-10-CM

## 2024-11-15 DIAGNOSIS — I70.0 AORTIC ATHEROSCLEROSIS: ICD-10-CM

## 2024-11-15 DIAGNOSIS — K58.1 IRRITABLE BOWEL SYNDROME WITH CONSTIPATION: ICD-10-CM

## 2024-11-15 DIAGNOSIS — K21.9 GASTROESOPHAGEAL REFLUX DISEASE WITHOUT ESOPHAGITIS: ICD-10-CM

## 2024-11-15 DIAGNOSIS — Z71.89 BEREAVEMENT COUNSELING: ICD-10-CM

## 2024-11-15 DIAGNOSIS — L30.9 DERMATITIS: ICD-10-CM

## 2024-11-15 DIAGNOSIS — E55.9 VITAMIN D DEFICIENCY DISEASE: ICD-10-CM

## 2024-11-15 DIAGNOSIS — E03.9 HYPOTHYROIDISM, UNSPECIFIED TYPE: ICD-10-CM

## 2024-11-15 DIAGNOSIS — F32.A DEPRESSION, UNSPECIFIED DEPRESSION TYPE: ICD-10-CM

## 2024-11-15 DIAGNOSIS — Z12.31 ENCOUNTER FOR SCREENING MAMMOGRAM FOR BREAST CANCER: ICD-10-CM

## 2024-11-15 DIAGNOSIS — Z00.00 ROUTINE MEDICAL EXAM: Primary | ICD-10-CM

## 2024-11-15 DIAGNOSIS — I10 ESSENTIAL HYPERTENSION: ICD-10-CM

## 2024-11-15 DIAGNOSIS — G43.909 MIGRAINE SYNDROME: ICD-10-CM

## 2024-11-15 DIAGNOSIS — D64.9 ANEMIA, UNSPECIFIED TYPE: ICD-10-CM

## 2024-11-15 PROCEDURE — 99999 PR PBB SHADOW E&M-EST. PATIENT-LVL IV: CPT | Mod: PBBFAC,HCNC,, | Performed by: INTERNAL MEDICINE

## 2024-11-15 RX ORDER — SUMATRIPTAN 50 MG/1
50 TABLET, FILM COATED ORAL
Qty: 10 TABLET | Refills: 12 | Status: SHIPPED | OUTPATIENT
Start: 2024-11-15 | End: 2024-12-15

## 2024-11-15 RX ORDER — LEVOTHYROXINE SODIUM 88 UG/1
88 TABLET ORAL DAILY
Qty: 90 TABLET | Refills: 3 | Status: SHIPPED | OUTPATIENT
Start: 2024-11-15

## 2024-11-15 NOTE — TELEPHONE ENCOUNTER
No care due was identified.  [facility] Embedded Care Due Messages. Reference number: [id number].   11/15/2024 3:42:30 PM CST

## 2024-11-15 NOTE — PROGRESS NOTES
Chief complaint, physical exam,    74-year-old white female who admittedly does not like going to the doctor..  She is here for her physical.  Regarding health maintenance it's been about 1 year since she had lab work.   mammogram.  She is overdue on her colonoscopy -due , last was .  She would like to get it done at the [facility] but okay with the [facility] since it is closer to her house.  Due to general orthopedic issues she has been exercising less and think she is getting deconditioned.  Colon Order in  and will again put that order in putting in it urgent since she is now a good five years overdue      ROS:   CONST: weight stable. EYES: no vision change. ENT: no sore throat. CV: no chest pain w/ exertion. RESP:No orthopnea PND or cough. GI: no nausea, vomiting, diarrhea. No dysphagia. : no urinary issues. MUSCULOSKELETAL: no new myalgias or arthralgias except for some issues with the left shoulder blade and left medial elbow SKIN: no new changes. NEURO: no focal deficits. PSYCH: no new issues. ENDOCRINE: no polyuria. HEME: no lymph nodes. ALLERGY: no general pruritis.         PAST MEDICAL HISTORY:    1. Endometriosis,   2. Hypertension -was followed by Nephrology in the past, Onset age 24          3. Osteoarthritis,Hands back and neck  4. Migraine.  5. IBS with constipation- was seen by [facility]  6. H/o palpitations- neg w/u by Cards  7. Anxiety with h/o panic attacks  8. GERD - Esophagitis/gastritis by EGD , hiatal hernia on EGD     9. HYPOthyroidism -saw Endo in the past.  10. Osteoporosis by BMD  by Dr Garcia. Osteopenia , intolerant to Fosamax orally in the past  11. Colonoscopy with one polyp , 5 years  12.  Left medial epicondylitis                                                                                                                         PAST SURGICAL HISTORY:  Hysterectomy, a , surgeries for             endometriosis, and tonsillectomy and  appendectomy.                                                                                                        SOCIAL HISTORY:  No tobacco use.  Drinks 2-3 glasses of wine every day or    every other day.      Vital signs as above  Gen: no distress  EYES: conjunctiva clear, non-icteric, PERRL  ENT: nose clear, nasal mucosa normal, oropharynx clear and moist, teeth good,   NECK:supple, thyroid non-palpable  RESP: effort is good, lungs clear  CV: heart RRR w/o murmur, gallops or rubs; no carotid bruits, no edema  GI: abdomen soft, non-distended, non-tender, no hepatosplenomegaly  MS: gait normal, no clubbing or cyanosis of the digits,  Skin no rashes, warm to touch    Aleksandra was seen today for annual exam.    Diagnoses and all orders for this visit:    Routine medical exam, overdue for colonoscopy,  due for mammogram, discussed vaccines           Encounter for screening mammogram for breast cancer, overdue  -        History of colonic polyps, very overdue, looks like order has been placed and discuss the referral process.  -     order in again                                  Additional significant and separate evaluation and management issues:      Additionally patient has numerous other medical issues to address completely separate from those of a preventative visit and medically necessary that we address them today. Patient tearful in the office.  Her youngest son  of a drug overdose about 4-5 months ago.  She really can not concentrate she feels stressed out.  She is not even putting on makeup which is unusual for her.  We discussed grieving as well as the development of some depression.  I previously gave her Lexapro 5 mg and she did not yet take it but encouraged her to do so discussing expectations safety and benefits.       she is concerned about hair loss.  Her thyroid functions normal she does want to get the brand name Synthroid so I change that prescription.  She had been on brand name before gave  her a generic in the interval.She did see one dermatology group for  her facial rash which is gone although she still has a little bit of redness on her face and discuss it could be some mild rosacea so she really should find a dermatology group that she likes and that takes her insurance.  She also should have them evaluate the hair loss issue which could be now that she brings it up all the recent stress so she had been under.  All blood work and so forth appears to be unremarkable.     Patient never did take the Boniva given by Dr Tang?.  She did see local Endocrine who encouraged her to take it again looking for GI side effects that she had daily Fosamax.    If indeed she tries parental therapy am sure will get a baseline prior to treatment.  Discussed the treatment will be an investment in her future and if she breaks hip she will be dependent upon family.  When seen 12/23 She was willing to retry Boniva discussed proper methodology for taking but NEVER tried it yet .  Encouraged her to do so again.    9/23  Osteoporosis both hips.  Osteopenia lumbar spine.  10-year Probability of Fracture:   Major Osteoporotic Fracture 18.5%.   Hip Fracture 6.4%.   Consider FDA approved medical therapies in postmenopausal women and men aged 50 years and older, based on the following:   *A hip or vertebral (clinical or morphometric) fracture  *T score less than or equal to -2.5 at the femoral neck or spine after appropriate evaluation to exclude secondary causes.  *Low bone mass -- also known as osteopenia (T score between -1.0 and -2.5 at the femoral neck or spine) and a 10 year probability of hip fracture greater than or equal to 3% or a 10 year probability of major osteoporosis-related fracture greater than or equal to 20% based on the [place]-adapted WHO algorithm.        history of headaches..  She does not have him all the time.  It looks like she would intermittently get some tension-type headaches with migraine features.  Her  neck muscles do hurt and she sometimes has to assist lifting her head up further probably is a muscle component and the headache does go from back to front.  It then also becomes a bitemporal throbbing headache with photophobia and nausea to the point of vomiting.  I reviewed the prior neurology notes and she did respond to Imitrex 25 mg in the past so will prescribe that again along with the Fioricet which she does respond to as well and takes it rarely.  I think she has a component of migraine with possible vertigo association and definitely a tension-type headache it could be the tension type headache that is triggering her migraines.  We discussed application of heat.  She does not like to take medications.  She was prescribed Robaxin in the past but may be never took it.  Her son has some at home and he does well with it and encouraged her to possibly even take one at night and assess if she gets a little bit of improvement and neck muscle stiffness in the morning and then to apply heat.    Seen ENT- sinus triggers migraine, nausea, seen ENT 4-5 x, fioricet once from them and once me.  She still has some available so I do not think she is overusing it was a small supply so will give her a refill.    Htn -ok     but   !!!  She would prefer not to take anything and we discussed there is no set formula but the good HDL probably does balance out with the LDL.  She has been eating a lot of boiled eggs we discussed reducing the yolks.  She eats a lot of cheese that could be contributing to the rise in the LDL.    HYPOTHYROIDISM - wt up but TSH down, T4 ok.  She says she still losing her hair.  Her repeat TSH looks normal    Still under lot of stress.  That is increased her headaches.  Her son has lot of medical problems and drug problems.  She has some depression and reduced motivational symptoms but also lot of stress anxiety and so forth.  She was given Ativan remotely and she knows it is just a  Band-Aid for the problem.   After the prior discussion we gave her Lexapro but again she had not yet taken it.       She saw Dermatology  and she describes some flushing on the cheeks and she had some irritation in the right eye so it was thought she might have rosacea in the eye but she was told that the other rash on her forehead would have to be addressed later which is unusual.  She saw the PA.  Rash started about a week and a half ago.  It is on the right forehead a little patch on the chin on the neck.  This probably suspicious for contact dermatitis.  She was given a Z-Hector and probably Metro gel that she did not start yet.  We discussed using some hydrocortisone to her dermatitis since it is likely contact and itched.  Not sure if she has rosacea as she does not have any appearance of rosacea         Patient's blood pressure up and so apparently she had EKG which came back abnormal.  I reviewed recent EKGs as well as the prior from 2013 reviewing the actual images.  It does appear that the possible anterior infarct age undetermined was there over 10 years ago as well and it is probably the ST segment depression issues laterally that were read out as new on the new EKG.  Her last nuclear stress test was 2013.      She does have a nuclear stress test scheduled and reassured her about the safety of the procedure    Normal sinus rhythm   Possible Anterior infarct ,age undetermined   ST and T wave abnormality, consider lateral ischemia   Abnormal ECG   When compared with ECG of 22-OCT-2024 18:17,   Significant changes have occurred   Confirmed by Ilya Lemus MD ([id number]) on 10/24/2024 8:41:23 PM      All these various issues reviewed and patient counseled and her evaluation and management today will be based upon time MDM And time spent with patient. Multiple chronic medical problems and a lot of acute uncontrolled medical problems addressed today making this a moderate complexity medical decision making all of  these issues were separate from her physical and health maintenance issues.          Assessment and plan:          Essential hypertension ,chronic condition and stable.    Hypothyroidism, unspecified type ,chronic condition and stable.  -     levothyroxine (SYNTHROID) 88 MCG tablet; Take 1 tablet (88 mcg total) by mouth once daily.    Osteoporosis, postmenopausal, still untreated and again encouraged her to take the Boniva    Vitamin D deficiency disease ,chronic condition and stable.    Migraine syndrome, med refilled, appears chronic and stable although generally worse secondary to the stress    Anemia, unspecified type ,chronic condition and stable.    Dermatitis, improved but make follow-up with dermatology for possible underlying rosacea or other issue as well as the hair loss    Irritable bowel syndrome with constipation ,chronic condition and stable.    Gastroesophageal reflux disease without esophagitis ,chronic condition and stable.    Aortic atherosclerosis ,chronic condition and stable.    Depression, unspecified depression type has new issue discuss, loss of her son, strongly encouraged her to use the Lexapro 5 mg and if not achieving desired benefit in 4-6 weeks it means we need to increase the dose to 10 mg.    Bereavement counseling    Other orders  -     sumatriptan (IMITREX) 50 MG tablet; Take 1 tablet (50 mg total) by mouth every 2 (two) hours as needed.

## 2024-11-16 NOTE — TELEPHONE ENCOUNTER
"Refill Routing Note   Medication(s) are not appropriate for processing by [facility] for the following reason(s):        Clarification of medication (Rx) details    [facility] action(s):  Defer        Medication Therapy Plan: Pharmacy comment: Script Clarification:VERIFY BEFORE PROCEEDING: WHAT IS THE MAX PT CAN TAKE PER DAY WITH DIRECTIONS " 1 Q 2 HOURS PRN?" REC DOSE IS ONLY 4 TABLETS PER DAY.      Appointments  past 12m or future 3m with PCP    Date Provider   Last Visit   11/15/2024 Julian Lee MD   Next Visit   Visit date not found Julian Lee MD   ED visits in past 90 days: 2        Note composed:1:32 AM 11/16/2024          "

## 2024-11-19 RX ORDER — SUMATRIPTAN 50 MG/1
TABLET, FILM COATED ORAL
Qty: 10 TABLET | Refills: 12 | Status: SHIPPED | OUTPATIENT
Start: 2024-11-19

## 2024-11-22 ENCOUNTER — HOSPITAL ENCOUNTER (OUTPATIENT)
Dept: RADIOLOGY | Facility: HOSPITAL | Age: 74
Discharge: HOME | End: 2024-11-22
Attending: INTERNAL MEDICINE
Payer: MEDICARE

## 2024-11-22 ENCOUNTER — HOSPITAL ENCOUNTER (OUTPATIENT)
Dept: CARDIOLOGY | Facility: HOSPITAL | Age: 74
Discharge: HOME | End: 2024-11-22
Attending: INTERNAL MEDICINE
Payer: MEDICARE

## 2024-11-22 DIAGNOSIS — R94.31 ABNORMAL EKG: ICD-10-CM

## 2024-11-22 LAB
CV STRESS BASE HR: 67 BPM
DIASTOLIC BLOOD PRESSURE: 71 MMHG
OHS CV CPX 85 PERCENT MAX PREDICTED HEART RATE MALE: 122
OHS CV CPX MAX PREDICTED HEART RATE: 144
OHS CV CPX PATIENT IS FEMALE: 1
OHS CV CPX PATIENT IS MALE: 0
OHS CV CPX PEAK DIASTOLIC BLOOD PRESSURE: 66 MMHG
OHS CV CPX PEAK HEAR RATE: 103 BPM
OHS CV CPX PEAK RATE PRESSURE PRODUCT: NORMAL
OHS CV CPX PEAK SYSTOLIC BLOOD PRESSURE: 161 MMHG
OHS CV CPX PERCENT MAX PREDICTED HEART RATE ACHIEVED: 74
OHS CV CPX RATE PRESSURE PRODUCT PRESENTING: NORMAL
OHS CV INITIAL DOSE: 10.7 MCG/KG/MIN
OHS CV PEAK DOSE: 31.2 MCG/KG/MIN
SYSTOLIC BLOOD PRESSURE: 152 MMHG

## 2024-11-22 PROCEDURE — 63600175 PHARM REV CODE 636 W HCPCS: Mod: HCNC | Performed by: INTERNAL MEDICINE

## 2024-11-22 PROCEDURE — 93018 CV STRESS TEST I&R ONLY: CPT | Mod: HCNC,,, | Performed by: INTERNAL MEDICINE

## 2024-11-22 PROCEDURE — 93017 CV STRESS TEST TRACING ONLY: CPT | Mod: HCNC

## 2024-11-22 PROCEDURE — 78452 HT MUSCLE IMAGE SPECT MULT: CPT | Mod: 26,HCNC,, | Performed by: INTERNAL MEDICINE

## 2024-11-22 PROCEDURE — 93016 CV STRESS TEST SUPVJ ONLY: CPT | Mod: HCNC,,, | Performed by: INTERNAL MEDICINE

## 2024-11-22 PROCEDURE — 78452 HT MUSCLE IMAGE SPECT MULT: CPT | Mod: HCNC

## 2024-11-22 PROCEDURE — A9502 TC99M TETROFOSMIN: HCPCS | Mod: HCNC | Performed by: INTERNAL MEDICINE

## 2024-11-22 RX ORDER — REGADENOSON 0.08 MG/ML
0.4 INJECTION, SOLUTION INTRAVENOUS ONCE
Status: COMPLETED | OUTPATIENT
Start: 2024-11-22 | End: 2024-11-22

## 2024-11-22 RX ADMIN — TETROFOSMIN 10.7 MILLICURIE: 1.38 INJECTION, POWDER, LYOPHILIZED, FOR SOLUTION INTRAVENOUS at 07:11

## 2024-11-22 RX ADMIN — TETROFOSMIN 31.2 MILLICURIE: 1.38 INJECTION, POWDER, LYOPHILIZED, FOR SOLUTION INTRAVENOUS at 09:11

## 2024-11-22 RX ADMIN — REGADENOSON 0.4 MG: 0.08 INJECTION, SOLUTION INTRAVENOUS at 09:11

## 2024-11-26 ENCOUNTER — TELEPHONE (OUTPATIENT)
Dept: ENDOSCOPY | Facility: HOSPITAL | Age: 74
End: 2024-11-26
Payer: MEDICARE

## 2024-11-26 NOTE — TELEPHONE ENCOUNTER
Contacted patient to schedule an endoscopy procedure(s): Colonoscopy . The patient did not answer the call. Voice message left requesting a call back and portal message sent.

## 2024-12-02 ENCOUNTER — TELEPHONE (OUTPATIENT)
Dept: ENDOSCOPY | Facility: HOSPITAL | Age: 74
End: 2024-12-02
Payer: MEDICARE

## 2024-12-02 NOTE — TELEPHONE ENCOUNTER
Contacted patient to schedule an endoscopy procedure(s): Colonoscopy. The patient did not answer the call. Voice message left requesting a call back and portal message sent.

## 2024-12-09 ENCOUNTER — TELEPHONE (OUTPATIENT)
Dept: ENDOSCOPY | Facility: HOSPITAL | Age: 74
End: 2024-12-09
Payer: MEDICARE

## 2024-12-09 NOTE — TELEPHONE ENCOUNTER
Contacted patient to schedule an endoscopy procedure(s): Colonsocopy. The patient did not answer the call. Voice message left requesting a call back and portal message sent.

## 2024-12-12 NOTE — TELEPHONE ENCOUNTER
Care Due:                  Date            Visit Type   Department     Provider  --------------------------------------------------------------------------------                                MYCHART                              ANNUAL       [facility]                              CHECKUP/PHY  MED/ INTERNAL  Julian Schulz  Last Visit: 11-      S            MED/ PEDS      [place]  Next Visit: None Scheduled  None         None Found                                                            Last  Test          Frequency    Reason                     Performed    Due Date  --------------------------------------------------------------------------------    Mg Level....  12 months..  ibandronate..............  Not Found    Overdue    Phosphate...  12 months..  ibandronate..............  Not Found    Overdue    [employer] Embedded Care Due Messages. Reference number: [id number].   12/12/2024 2:56:53 PM CST

## 2024-12-16 ENCOUNTER — OFFICE VISIT (OUTPATIENT)
Dept: OPTOMETRY | Facility: CLINIC | Age: 74
End: 2024-12-16
Payer: MEDICARE

## 2024-12-16 DIAGNOSIS — H40.033 ANATOMICAL NARROW ANGLE GLAUCOMA, BILATERAL: Primary | ICD-10-CM

## 2024-12-16 DIAGNOSIS — H04.123 DRY EYE SYNDROME, BILATERAL: ICD-10-CM

## 2024-12-16 DIAGNOSIS — H25.13 NUCLEAR SCLEROSIS OF BOTH EYES: ICD-10-CM

## 2024-12-16 PROCEDURE — 99214 OFFICE O/P EST MOD 30 MIN: CPT | Mod: HCNC,S$GLB,, | Performed by: OPTOMETRIST

## 2024-12-16 PROCEDURE — 1126F AMNT PAIN NOTED NONE PRSNT: CPT | Mod: HCNC,CPTII,S$GLB, | Performed by: OPTOMETRIST

## 2024-12-16 PROCEDURE — 3288F FALL RISK ASSESSMENT DOCD: CPT | Mod: HCNC,CPTII,S$GLB, | Performed by: OPTOMETRIST

## 2024-12-16 PROCEDURE — 1160F RVW MEDS BY RX/DR IN RCRD: CPT | Mod: HCNC,CPTII,S$GLB, | Performed by: OPTOMETRIST

## 2024-12-16 PROCEDURE — G2211 COMPLEX E/M VISIT ADD ON: HCPCS | Mod: HCNC,S$GLB,, | Performed by: OPTOMETRIST

## 2024-12-16 PROCEDURE — 1101F PT FALLS ASSESS-DOCD LE1/YR: CPT | Mod: HCNC,CPTII,S$GLB, | Performed by: OPTOMETRIST

## 2024-12-16 PROCEDURE — 1159F MED LIST DOCD IN RCRD: CPT | Mod: HCNC,CPTII,S$GLB, | Performed by: OPTOMETRIST

## 2024-12-16 PROCEDURE — 99999 PR PBB SHADOW E&M-EST. PATIENT-LVL III: CPT | Mod: PBBFAC,HCNC,, | Performed by: OPTOMETRIST

## 2024-12-16 RX ORDER — AZELASTINE 1 MG/ML
2 SPRAY, METERED NASAL 2 TIMES DAILY
Qty: 120 ML | Refills: 12 | Status: SHIPPED | OUTPATIENT
Start: 2024-12-16

## 2024-12-16 RX ORDER — LEVOCETIRIZINE DIHYDROCHLORIDE 5 MG/1
5 TABLET, FILM COATED ORAL DAILY
Qty: 90 TABLET | Refills: 12 | Status: SHIPPED | OUTPATIENT
Start: 2024-12-16

## 2024-12-16 RX ORDER — FLUTICASONE PROPIONATE 50 MCG
2 SPRAY, SUSPENSION (ML) NASAL DAILY
Qty: 48 ML | Refills: 12 | Status: SHIPPED | OUTPATIENT
Start: 2024-12-16

## 2024-12-16 RX ORDER — DORZOLAMIDE HYDROCHLORIDE AND TIMOLOL MALEATE 20; 5 MG/ML; MG/ML
1 SOLUTION/ DROPS OPHTHALMIC 2 TIMES DAILY
Qty: 10 ML | Refills: 1 | Status: SHIPPED | OUTPATIENT
Start: 2024-12-16 | End: 2025-12-16

## 2024-12-16 NOTE — PROGRESS NOTES
Subjective:       Patient ID: Aleksandra Santana is a 74 y.o. female      Chief Complaint   Patient presents with    Concerns About Ocular Health    Glaucoma     History of Present Illness  Dls: 5/10/24 Dr. Amezcua     73 y/o female presents today for iop ck.  Pt c/o tired eyes ou. Still seeing floaters ou.     Eye meds  Latanoprost Q HS OU       Assessment/Plan:     1. Anatomical narrow angle glaucoma, bilateral   02/06/24: Elevated IOP OD (29 // 21). OCT with RNFL thinning OD >> OS. No Fhx. Start latanoprost QHS OU.    5/10/24: IOP much improved with latanoprost QHS OU (15 // 12). HVF with superior defect OD, normal OS. Continue latanoprost QHS OU.     12/16/24: pt reports good compliance with drops but IOP OD elevated  (30 //15). Discussed with pt, stop latanoprost, switch to cosopt BID OU. Likely phacomorphic component as angles are narrow. Referral to glaucoma service for evaluation.      - dorzolamide-timolol 2-0.5% (COSOPT) 22.3-6.8 mg/mL ophthalmic solution; Place 1 drop into both eyes 2 (two) times daily.  Dispense: 10 mL; Refill: 1    2. Nuclear sclerosis of both eyes  Pt states NSC NVS, ordered DVO glasses but has not picked them up yet. Will have glaucoma service evaluate.    3. Dry eye syndrome, bilateral  Recommend Refresh/Systane BID - QID OU PRN.     Today's visit is associated with current and anticipated ongoing medical care related to this patient's single serious/complex condition (glaucoma). Follow up is to be continued indefinitely to monitor the condition.       Follow up for glaucoma consult.

## 2025-01-03 ENCOUNTER — TELEPHONE (OUTPATIENT)
Dept: ENDOSCOPY | Facility: HOSPITAL | Age: 75
End: 2025-01-03
Payer: MEDICARE

## 2025-01-03 NOTE — TELEPHONE ENCOUNTER
Received Pt's Voicemail Contacted the patient to schedule an endoscopy procedure(s) . The patient did not answer the call and left a voice message requesting a call back.

## 2025-01-08 ENCOUNTER — OFFICE VISIT (OUTPATIENT)
Dept: OPHTHALMOLOGY | Facility: CLINIC | Age: 75
End: 2025-01-08
Payer: MEDICARE

## 2025-01-08 DIAGNOSIS — H40.2214 CHRONIC ANGLE-CLOSURE GLAUCOMA OF RIGHT EYE, INDETERMINATE STAGE: Primary | ICD-10-CM

## 2025-01-08 DIAGNOSIS — H25.812 COMBINED FORM OF AGE-RELATED CATARACT, LEFT EYE: ICD-10-CM

## 2025-01-08 DIAGNOSIS — H40.2224 CHRONIC ANGLE-CLOSURE GLAUCOMA OF LEFT EYE, INDETERMINATE STAGE: ICD-10-CM

## 2025-01-08 DIAGNOSIS — H04.123 DRY EYE SYNDROME OF BOTH EYES: ICD-10-CM

## 2025-01-08 DIAGNOSIS — H25.811 COMBINED FORM OF AGE-RELATED CATARACT, RIGHT EYE: ICD-10-CM

## 2025-01-08 PROCEDURE — 92020 GONIOSCOPY: CPT | Mod: S$GLB,,, | Performed by: STUDENT IN AN ORGANIZED HEALTH CARE EDUCATION/TRAINING PROGRAM

## 2025-01-08 PROCEDURE — G2211 COMPLEX E/M VISIT ADD ON: HCPCS | Mod: S$GLB,,, | Performed by: STUDENT IN AN ORGANIZED HEALTH CARE EDUCATION/TRAINING PROGRAM

## 2025-01-08 PROCEDURE — 92136 OPHTHALMIC BIOMETRY: CPT | Mod: RT,S$GLB,, | Performed by: STUDENT IN AN ORGANIZED HEALTH CARE EDUCATION/TRAINING PROGRAM

## 2025-01-08 PROCEDURE — 1126F AMNT PAIN NOTED NONE PRSNT: CPT | Mod: CPTII,S$GLB,, | Performed by: STUDENT IN AN ORGANIZED HEALTH CARE EDUCATION/TRAINING PROGRAM

## 2025-01-08 PROCEDURE — 1101F PT FALLS ASSESS-DOCD LE1/YR: CPT | Mod: CPTII,S$GLB,, | Performed by: STUDENT IN AN ORGANIZED HEALTH CARE EDUCATION/TRAINING PROGRAM

## 2025-01-08 PROCEDURE — 3288F FALL RISK ASSESSMENT DOCD: CPT | Mod: CPTII,S$GLB,, | Performed by: STUDENT IN AN ORGANIZED HEALTH CARE EDUCATION/TRAINING PROGRAM

## 2025-01-08 PROCEDURE — 99204 OFFICE O/P NEW MOD 45 MIN: CPT | Mod: S$GLB,,, | Performed by: STUDENT IN AN ORGANIZED HEALTH CARE EDUCATION/TRAINING PROGRAM

## 2025-01-08 PROCEDURE — 99999 PR PBB SHADOW E&M-EST. PATIENT-LVL III: CPT | Mod: PBBFAC,,, | Performed by: STUDENT IN AN ORGANIZED HEALTH CARE EDUCATION/TRAINING PROGRAM

## 2025-01-08 PROCEDURE — 1159F MED LIST DOCD IN RCRD: CPT | Mod: CPTII,S$GLB,, | Performed by: STUDENT IN AN ORGANIZED HEALTH CARE EDUCATION/TRAINING PROGRAM

## 2025-01-08 PROCEDURE — 92133 CPTRZD OPH DX IMG PST SGM ON: CPT | Mod: S$GLB,,, | Performed by: STUDENT IN AN ORGANIZED HEALTH CARE EDUCATION/TRAINING PROGRAM

## 2025-01-08 NOTE — PROGRESS NOTES
Subjective:  HPI    74 year old female   Ref by Dr. Amezcua     Pt started on Cosopt-complained of burning-switched back to Latanoprost   Chief complaint: Ref for narrow angle evaluation       Past medical history? +HTN/Hypothyroid  Past ocular history? NSC/SVITLANA    Glaucoma history:  Diagnosed with glaucoma when? 02/06/2024  Hx eye surgery? No  Hx eye lasers? No  History of low blood pressure? No  History of migraines? Yes  History of blunt trauma to eye? No  History of steroid use? NO  Family history of glaucoma? NO  What is the highest your eye pressure has been? 30    Eye drops? Latanoprost Q HSOU     Last edited by Dottie Marroquin MA on 1/8/2025 10:39 AM.        Exam:  See encounter report for full exam    Assessment:  1. Chronic angle-closure glaucoma of right eye, indeterminate stage  2. Chronic angle-closure glaucoma of left eye, indeterminate stage  OD>>OS  Mild hyperopia  - Referral from: Dr. Amezcua. Establishing me 1/2025.  - PMH: hypothyroidism, osteoporosis, headaches, HLD, depression  - Synopsis: diagnosed with glaucoma 2/2024  - H/o migraines  - Surg hx: none   - Laser hx: none  - Glaucoma FHx: denies  -  / 620  - Gonio: closed OD, narrow OS (Coulon 1/2025)  - Tmax: 32/21  - Target IOP: pending  - Med adverse effects: n/a  - Medication hx: latanoprost 2/2024, cosopt 12/2024    Baseline photos pending    01/08/2025  IOP 32/15 on Xalatan qhs OU, stopped Cosopt due to burning  HVF not scheduled  OCT 2/2024 reviewed: thinning c/w glaucoma OD>OS  OCT today: sup<inf thinning, avg 56 OD  ST thinning, avg 84 -- baseline BMO, appears worse c/w RNFL OU 2/2024    3. 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 cataract, right eye  4. 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 cataract, left eye  Visually significant OD>OS, causing phacomorphic component of angle closure  - Guttae, PXF, or phacodonesis: none  - H/o LASIK/PRK, RK, or retinal surgery: none  - Dilation: deferred/very narrow  - Target: plano  - Astigmatism: none, no toric,  understands need for reading glasses  - Special needs: Trypan  - Lens: OD DIBOO 24.5, MA60AC 23.5; OS DIBOO 25.5, MA60AC 24.5  - Veracity predicted MRx: OD -0.43 + 0.34 x 144° (SE -0.26); OS -0.54 + 0.50 x 10° (SE -0.29)    Risks, benefits, and alternatives discussed with the patient. As a benefit, I expect cataract surgery to resolve many of the current symptoms. Given the patient's limitation, we discussed alternatives in the management including observation with or without an updated refraction, and cataract surgery and the patient elects to proceed with surgery. We also reviewed the most common and most serious risks of cataract surgery, including infections (~1 in 4000), retinal detachment (~5 in 1000), retina/macular or corneal edema (delayed visual recovery), retained lens fragment (~1 in 200 may require another surgery or vitrectomy), vitreous loss, damage to the iris, possibility of not being able to implant a lens (aphakia, leading to a second surgery), and residual refractive error (which may may require using glasses, contact lenses, laser or even a lens exchange. This has under 2% chance of being significant unless patient has had refractive surgery). The patient understands that this list is not all-inclusive and that unusual complications may arise after any surgical procedure. If the patient is an appropriate candidate, we discussed the possibility of multifocal and toric lenses. I explained that no lens option can guarantee full spectacle independence, especially for small print or low light conditions, but some offer less spectacle dependence than others. The patient expressed understanding of these risks, benefits and alternatives and desires to proceed with cataract surgery.    5. Dry eye syndrome of both eyes  ATs PRN    Plan:  IOP above goal OD, narrow angles with phacomorphic component OD>OS. Discussed phaco vs LPI, recommend phaco OD then OS.  - Change to Cosopt 2/2, Xalatan qhs/qhs  -  Schedule phaco OD then OS  - Update HVF post phaco    Today's visit is associated with current and anticipated ongoing medical care related to this patient's single serious/complex condition (glaucoma). Follow up is to be continued indefinitely to monitor the condition.    Return OR TBD -- phaco/IOL OD  Lens: DIBOO 24.5, MA60AC 23.5    Elida Barbosa MD  [facility]

## 2025-01-09 ENCOUNTER — PATIENT MESSAGE (OUTPATIENT)
Dept: OPHTHALMOLOGY | Facility: CLINIC | Age: 75
End: 2025-01-09
Payer: MEDICARE

## 2025-01-09 DIAGNOSIS — H25.811 COMBINED FORM OF AGE-RELATED CATARACT, RIGHT EYE: Primary | ICD-10-CM

## 2025-01-09 DIAGNOSIS — H40.1131 PRIMARY OPEN ANGLE GLAUCOMA (POAG) OF BOTH EYES, MILD STAGE: ICD-10-CM

## 2025-01-09 DIAGNOSIS — H40.033 ANATOMICAL NARROW ANGLE GLAUCOMA, BILATERAL: ICD-10-CM

## 2025-01-09 RX ORDER — LATANOPROST 50 UG/ML
1 SOLUTION/ DROPS OPHTHALMIC NIGHTLY
Qty: 2.5 ML | Refills: 11 | Status: SHIPPED | OUTPATIENT
Start: 2025-01-09 | End: 2026-01-09

## 2025-01-09 RX ORDER — DORZOLAMIDE HYDROCHLORIDE AND TIMOLOL MALEATE 20; 5 MG/ML; MG/ML
1 SOLUTION/ DROPS OPHTHALMIC 2 TIMES DAILY
Qty: 10 ML | Refills: 11 | Status: SHIPPED | OUTPATIENT
Start: 2025-01-09 | End: 2026-01-09

## 2025-01-09 NOTE — TELEPHONE ENCOUNTER
----- Message from Ras sent at 1/9/2025  8:54 AM CST -----  Regarding: Consult/Advisory  Contact: [phone]  Consult/Advisory     Name Of Caller: Aleksandra Santana        Contact Preference:  [phone]     Nature of call: Calling because she left both boxes of the drops she was given at her appt yesterday. The Latanoprost and the dorzolamide-timolol. She was in room 16. Please call back to further assist.

## 2025-02-05 RX ORDER — OLMESARTAN MEDOXOMIL 40 MG/1
40 TABLET ORAL DAILY
Qty: 90 TABLET | Refills: 12 | Status: SHIPPED | OUTPATIENT
Start: 2025-02-05

## 2025-02-05 NOTE — TELEPHONE ENCOUNTER
No care due was identified.  [facility] Embedded Care Due Messages. Reference number: [id number].   2/04/2025 8:08:06 PM CST

## 2025-02-10 ENCOUNTER — TELEPHONE (OUTPATIENT)
Dept: OPHTHALMOLOGY | Facility: CLINIC | Age: 75
End: 2025-02-10
Payer: MEDICARE

## 2025-02-10 NOTE — TELEPHONE ENCOUNTER
----- Message from Paris sent at 2/10/2025  1:13 PM CST -----  Caller is requesting to schedule their surgery.  Name of Caller:Aleksandra Jensen Call Back Number: [phone]  Additional Information: Arrival time and a sinus issue    Patient was given for 10am arrival time for surgery with , however, she has concerns about possible sinus infection. Message was forward to

## 2025-02-10 NOTE — PRE-PROCEDURE INSTRUCTIONS
Patient reviewed on 2/10/2025.  Okay to proceed at [facility]. The following pre-procedure instructions and arrival time have been reviewed with patient via phone and sent to patient portal for review.  Patient verbalized an understanding.  Pt to be accompanied by family day of procedure as responsible .    Dear Aleksandra     Below you will find basic pre-procedure instructions in preparation for your procedure on 2/11/25 with Dr. Barbosa        You should receive your arrival time within 24-48 hours of your scheduled procedure date from the  at your surgeon's office.     -Nothing to eat or drink after midnight the night before your procedure until after your procedure, except AM meds with small sips of water.     - HOLD all oral Diabetic medications night before and morning of procedure  - HOLD all Insulin morning of procedure  - HOLD all Fluid pills morning of procedure  - HOLD all non-insulin shots until after surgery (Ozempic, Mounjaro, Trulicity, Victoza, Byetta, Wegovy and Adlyxin) (7 days prior)  - HOLD all vitamins, minerals and herbal supplements morning of procedure   - TAKE all B/P meds, EXCEPT those that contain a fluid pill (ex. Lasix, Hydroclorothiazide/HCTZ, Spirnolactone)  - USE inhalers as needed and bring AM of surgery  - USE EYE DROPS as directed  -TAKE blood thinner meds AM of surgery unless otherwise instructed by your provider to not take     - Shower and wash face with antibacterial soap (ex. Dial) for 3 mins PM prior and AM of surgery  - No powder, lotions, creams, oils, gels, ointments, makeup,  or jewelry    - Wear comfortable clothing (button up shirt)     (Patient is required to have a responsible ride to transport home, ride may not leave while patient is in surgery)     -- [facility], 2nd floor Surgery Center, located   @ [address] [place] [address]  2nd Floor Registration        If you have any questions or concerns please feel free to  contact your surgeon's office.     In the event that you are running late or need to reschedule on the day of your procedure, please contact the pre-op desk at [phone].          Please reply to this message as receipt of delivery.     [place], LPN [facility]  Pre-Admit - Anesthesia Dept

## 2025-02-11 ENCOUNTER — ANESTHESIA EVENT (OUTPATIENT)
Dept: SURGERY | Facility: HOSPITAL | Age: 75
End: 2025-02-11
Payer: MEDICARE

## 2025-02-11 ENCOUNTER — ANESTHESIA (OUTPATIENT)
Dept: SURGERY | Facility: HOSPITAL | Age: 75
End: 2025-02-11
Payer: MEDICARE

## 2025-02-11 ENCOUNTER — HOSPITAL ENCOUNTER (OUTPATIENT)
Facility: HOSPITAL | Age: 75
Discharge: HOME | End: 2025-02-11
Attending: STUDENT IN AN ORGANIZED HEALTH CARE EDUCATION/TRAINING PROGRAM | Admitting: STUDENT IN AN ORGANIZED HEALTH CARE EDUCATION/TRAINING PROGRAM
Payer: MEDICARE

## 2025-02-11 VITALS
DIASTOLIC BLOOD PRESSURE: 62 MMHG | WEIGHT: 139 LBS | BODY MASS INDEX: 26.24 KG/M2 | HEIGHT: 61 IN | RESPIRATION RATE: 16 BRPM | SYSTOLIC BLOOD PRESSURE: 123 MMHG | OXYGEN SATURATION: 98 % | HEART RATE: 57 BPM | TEMPERATURE: 98 F

## 2025-02-11 DIAGNOSIS — H25.811 COMBINED FORM OF AGE-RELATED CATARACT, RIGHT EYE: Primary | ICD-10-CM

## 2025-02-11 DIAGNOSIS — H25.811 COMBINED FORMS OF AGE-RELATED CATARACT OF RIGHT EYE: ICD-10-CM

## 2025-02-11 PROCEDURE — 37000009 HC ANESTHESIA EA ADD 15 MINS: Performed by: STUDENT IN AN ORGANIZED HEALTH CARE EDUCATION/TRAINING PROGRAM

## 2025-02-11 PROCEDURE — 63600175 PHARM REV CODE 636 W HCPCS: Performed by: NURSE ANESTHETIST, CERTIFIED REGISTERED

## 2025-02-11 PROCEDURE — 63600175 PHARM REV CODE 636 W HCPCS: Performed by: STUDENT IN AN ORGANIZED HEALTH CARE EDUCATION/TRAINING PROGRAM

## 2025-02-11 PROCEDURE — 36000707: Performed by: STUDENT IN AN ORGANIZED HEALTH CARE EDUCATION/TRAINING PROGRAM

## 2025-02-11 PROCEDURE — 99900035 HC TECH TIME PER 15 MIN (STAT)

## 2025-02-11 PROCEDURE — 66984 XCAPSL CTRC RMVL W/O ECP: CPT | Mod: RT,,, | Performed by: STUDENT IN AN ORGANIZED HEALTH CARE EDUCATION/TRAINING PROGRAM

## 2025-02-11 PROCEDURE — 25000003 PHARM REV CODE 250: Performed by: STUDENT IN AN ORGANIZED HEALTH CARE EDUCATION/TRAINING PROGRAM

## 2025-02-11 PROCEDURE — 36000706: Performed by: STUDENT IN AN ORGANIZED HEALTH CARE EDUCATION/TRAINING PROGRAM

## 2025-02-11 PROCEDURE — 94761 N-INVAS EAR/PLS OXIMETRY MLT: CPT

## 2025-02-11 PROCEDURE — V2632 POST CHMBR INTRAOCULAR LENS: HCPCS | Performed by: STUDENT IN AN ORGANIZED HEALTH CARE EDUCATION/TRAINING PROGRAM

## 2025-02-11 PROCEDURE — 71000015 HC POSTOP RECOV 1ST HR: Performed by: STUDENT IN AN ORGANIZED HEALTH CARE EDUCATION/TRAINING PROGRAM

## 2025-02-11 PROCEDURE — A4216 STERILE WATER/SALINE, 10 ML: HCPCS | Performed by: STUDENT IN AN ORGANIZED HEALTH CARE EDUCATION/TRAINING PROGRAM

## 2025-02-11 PROCEDURE — 37000008 HC ANESTHESIA 1ST 15 MINUTES: Performed by: STUDENT IN AN ORGANIZED HEALTH CARE EDUCATION/TRAINING PROGRAM

## 2025-02-11 DEVICE — LENS EYHANCE +24.5D: Type: IMPLANTABLE DEVICE | Site: EYE | Status: FUNCTIONAL

## 2025-02-11 RX ORDER — ACETAZOLAMIDE 500 MG/1
CAPSULE, EXTENDED RELEASE ORAL
Qty: 2 CAPSULE | Refills: 0 | Status: SHIPPED | OUTPATIENT
Start: 2025-02-11

## 2025-02-11 RX ORDER — SODIUM CHLORIDE 0.9 % (FLUSH) 0.9 %
2 SYRINGE (ML) INJECTION
Status: DISCONTINUED | OUTPATIENT
Start: 2025-02-11 | End: 2025-02-11 | Stop reason: HOSPADM

## 2025-02-11 RX ORDER — CEFAZOLIN SODIUM 1 G/3ML
INJECTION, POWDER, FOR SOLUTION INTRAMUSCULAR; INTRAVENOUS
Status: DISCONTINUED | OUTPATIENT
Start: 2025-02-11 | End: 2025-02-11 | Stop reason: HOSPADM

## 2025-02-11 RX ORDER — MOXIFLOXACIN 5 MG/ML
SOLUTION/ DROPS OPHTHALMIC
Status: DISCONTINUED | OUTPATIENT
Start: 2025-02-11 | End: 2025-02-11 | Stop reason: HOSPADM

## 2025-02-11 RX ORDER — CYCLOP/TROP/PROPA/PHEN/KET/WAT 1-1-0.1%
1 DROPS (EA) OPHTHALMIC (EYE)
Status: DISCONTINUED | OUTPATIENT
Start: 2025-02-11 | End: 2025-02-11 | Stop reason: HOSPADM

## 2025-02-11 RX ORDER — PREDNISOLONE ACETATE 10 MG/ML
1 SUSPENSION/ DROPS OPHTHALMIC
Status: COMPLETED | OUTPATIENT
Start: 2025-02-11 | End: 2025-02-11

## 2025-02-11 RX ORDER — MANNITOL 250 MG/ML
INJECTION, SOLUTION INTRAVENOUS
Status: DISCONTINUED | OUTPATIENT
Start: 2025-02-11 | End: 2025-02-11

## 2025-02-11 RX ORDER — MIDAZOLAM HYDROCHLORIDE 1 MG/ML
INJECTION, SOLUTION INTRAMUSCULAR; INTRAVENOUS
Status: DISCONTINUED | OUTPATIENT
Start: 2025-02-11 | End: 2025-02-11

## 2025-02-11 RX ORDER — PROPARACAINE HYDROCHLORIDE 5 MG/ML
SOLUTION/ DROPS OPHTHALMIC
Status: DISCONTINUED | OUTPATIENT
Start: 2025-02-11 | End: 2025-02-11 | Stop reason: HOSPADM

## 2025-02-11 RX ORDER — PREDNISOLONE ACETATE 10 MG/ML
SUSPENSION/ DROPS OPHTHALMIC
Status: DISCONTINUED | OUTPATIENT
Start: 2025-02-11 | End: 2025-02-11 | Stop reason: HOSPADM

## 2025-02-11 RX ORDER — MOXIFLOXACIN 5 MG/ML
1 SOLUTION/ DROPS OPHTHALMIC
Status: COMPLETED | OUTPATIENT
Start: 2025-02-11 | End: 2025-02-11

## 2025-02-11 RX ORDER — KETOROLAC TROMETHAMINE 5 MG/ML
1 SOLUTION OPHTHALMIC 4 TIMES DAILY
Qty: 5 ML | Refills: 1 | Status: SHIPPED | OUTPATIENT
Start: 2025-02-11

## 2025-02-11 RX ORDER — LIDOCAINE HYDROCHLORIDE 40 MG/ML
INJECTION, SOLUTION RETROBULBAR
Status: DISCONTINUED | OUTPATIENT
Start: 2025-02-11 | End: 2025-02-11 | Stop reason: HOSPADM

## 2025-02-11 RX ORDER — FENTANYL CITRATE 50 UG/ML
INJECTION, SOLUTION INTRAMUSCULAR; INTRAVENOUS
Status: DISCONTINUED | OUTPATIENT
Start: 2025-02-11 | End: 2025-02-11

## 2025-02-11 RX ORDER — MOXIFLOXACIN 5 MG/ML
SOLUTION/ DROPS OPHTHALMIC
Qty: 3 ML | Refills: 0 | Status: SHIPPED | OUTPATIENT
Start: 2025-02-11

## 2025-02-11 RX ORDER — PREDNISOLONE ACETATE 10 MG/ML
1 SUSPENSION/ DROPS OPHTHALMIC 4 TIMES DAILY
Qty: 5 ML | Refills: 1 | Status: SHIPPED | OUTPATIENT
Start: 2025-02-11 | End: 2026-02-11

## 2025-02-11 RX ORDER — DEXAMETHASONE SODIUM PHOSPHATE 4 MG/ML
INJECTION, SOLUTION INTRA-ARTICULAR; INTRALESIONAL; INTRAMUSCULAR; INTRAVENOUS; SOFT TISSUE
Status: DISCONTINUED | OUTPATIENT
Start: 2025-02-11 | End: 2025-02-11 | Stop reason: HOSPADM

## 2025-02-11 RX ORDER — LIDOCAINE HYDROCHLORIDE 10 MG/ML
INJECTION, SOLUTION EPIDURAL; INFILTRATION; INTRACAUDAL; PERINEURAL
Status: DISCONTINUED | OUTPATIENT
Start: 2025-02-11 | End: 2025-02-11 | Stop reason: HOSPADM

## 2025-02-11 RX ADMIN — MOXIFLOXACIN OPHTHALMIC 1 DROP: 5 SOLUTION/ DROPS OPHTHALMIC at 11:02

## 2025-02-11 RX ADMIN — FENTANYL CITRATE 50 MCG: 50 INJECTION, SOLUTION INTRAMUSCULAR; INTRAVENOUS at 11:02

## 2025-02-11 RX ADMIN — MIDAZOLAM HYDROCHLORIDE 1 MG: 1 INJECTION, SOLUTION INTRAMUSCULAR; INTRAVENOUS at 11:02

## 2025-02-11 RX ADMIN — PREDNISOLONE ACETATE 1 DROP: 10 SUSPENSION/ DROPS OPHTHALMIC at 10:02

## 2025-02-11 RX ADMIN — MANNITOL 12.5 G: 250 INJECTION, SOLUTION INTRAVENOUS at 11:02

## 2025-02-11 RX ADMIN — Medication 1 DROP: at 10:02

## 2025-02-11 RX ADMIN — MOXIFLOXACIN OPHTHALMIC 1 DROP: 5 SOLUTION/ DROPS OPHTHALMIC at 10:02

## 2025-02-11 RX ADMIN — PREDNISOLONE ACETATE 1 DROP: 10 SUSPENSION/ DROPS OPHTHALMIC at 11:02

## 2025-02-11 RX ADMIN — SODIUM CHLORIDE 10 ML: 9 INJECTION, SOLUTION INTRAMUSCULAR; INTRAVENOUS; SUBCUTANEOUS at 11:02

## 2025-02-11 RX ADMIN — Medication 1 DROP: at 11:02

## 2025-02-11 NOTE — INTERVAL H&P NOTE
BRIEF HISTORY, PERTINENT EXAM:  H&P reviewed. No changes.    ASSESSMENT/PLAN:  Proceed with surgery as planned -- phaco/IOL right eye    Elida Barbosa MD

## 2025-02-11 NOTE — ANESTHESIA POSTPROCEDURE EVALUATION
Anesthesia Post Evaluation    Patient: Aleksandra Santana    Procedure(s) Performed: Procedure(s) (LRB):  PHACOEMULSIFICATION, CATARACT, WITH IOL INSERTION (Right)    Final Anesthesia Type: MAC      Patient location during evaluation: PACU  Patient participation: Yes- Able to Participate  Level of consciousness: awake and alert  Post-procedure vital signs: reviewed and stable  Pain management: adequate  Airway patency: patent    PONV status at discharge: No PONV  Anesthetic complications: no      Cardiovascular status: stable  Respiratory status: spontaneous ventilation  Hydration status: euvolemic  Follow-up not needed.              Vitals Value Taken Time   /62 02/11/25 1216   Temp 36.5 °C (97.7 °F) 02/11/25 1216   Pulse 57 02/11/25 1216   Resp 16 02/11/25 1216   SpO2 98 % 02/11/25 1216         No case tracking events are documented in the log.      Pain/Torito Score: Torito Score: 10 (2/11/2025 12:12 PM)

## 2025-02-11 NOTE — TRANSFER OF CARE
"Anesthesia Transfer of Care Note    Patient: Aleksandra Santana    Procedure(s) Performed: Procedure(s) (LRB):  PHACOEMULSIFICATION, CATARACT, WITH IOL INSERTION (Right)    Patient location: PACU    Anesthesia Type: MAC    Transport from OR: Transported from OR on room air with adequate spontaneous ventilation    Post pain: adequate analgesia    Post assessment: no apparent anesthetic complications and tolerated procedure well    Post vital signs: stable    Level of consciousness: awake, alert and oriented    Nausea/Vomiting: no nausea/vomiting    Complications: none    Transfer of care protocol was followed      Last vitals: Visit Vitals  BP (!) 159/72 (BP Location: Left arm, Patient Position: Lying)   Pulse 70   Temp 36.5 °C (97.7 °F) (Temporal)   Resp 20   Ht 5' 1" (1.549 m)   Wt 63 kg (139 lb)   SpO2 100%   Breastfeeding No   BMI 26.26 kg/m²     "

## 2025-02-11 NOTE — OP NOTE
DATE OF SURGERY: 2/11/2025    PREOPERATIVE DIAGNOSES:  1. Visually significant combined form cataract, right eye  2. Chronic angle closure glaucoma, right eye  3. Floppy iris syndrome, right eye    POSTOPERATIVE DIAGNOSES:  Same    PROCEDURES PERFORMED:  Cataract extraction with intraocular lens implant, right eye    ATTENDING SURGEON:  Elida Barbosa M.D.    ANESTHESIA:  MAC    COMPLICATIONS:  None.    IMPLANTS:   Implant Name Type Inv. Item Serial No.  Lot No. LRB No. Used Action   LENS EYHANCE +24.5D - [id number]  LENS EYHANCE +24.5D [id number] [employer]  Right 1 Implanted       JUSTIFICATION OF SURGERY:     Aleksandra Santana is a 74 y.o. female with a history and physical exam consistent with a visually significant cataract. We discussed her medical conditions and treatment options, including the risks, benefits, and alternatives of surgery. she expressed her understanding of the risks, benefits, and alternatives to the procedure including, but not limited to infection, bleeding, loss of vision, loss of eye, corneal edema, need for glasses, and need for further surgical intervention. After answering all her questions, there was an understanding of the issues involved with surgery and the consent was signed.    PROCEDURE:  Local anesthesia  Betadine paint and drop in holding room   Prep and drape in usual manner in OR  Time out according to protocol  Temporal lid speculum placed  Paracentesis made with sideport blade  Lidocaine and Omidria injected. Viscoelastic injected  Clear corneal incision made with 2.5 mm keratome blade  Iris noted to be floppy with immediate protrusion through main wound  Mannitol 12.5 given by anesthesia team upon my request  Viscoelastic used to reposit iris into the eye  Continuous curvilinear capsulorrhexis created using a prebent cystotome and Utrata forceps  Gentle hydrodissection until nucleus was mobile  Phacoemulsification tip used to disassemble the  lens and remove it  Removal of remaining cortical material and epinuclear shell with the irrigation and aspiration handpiece  Capsular bag inflated with Provisc  Intraocular lens injected into the capsular bag and centered  Viscoelastic removed with the irrigation and aspiration handpiece  Miostat injected to attempt pupillary constriction  Marti BAHENA left under main wound  Both wounds hydrated, suture placed to close main wound, wounds checked and found to be watertight  Subconjunctival injection of antibiotics and steroids  Speculum removed from the eye  Anti-inflammatory and antibiotic drops instilled into the eye  Patch and plastic shield placed on the eye    The patient tolerated the procedure well. There were no complications and the patient left the operating room in good condition. Arrangements were made for the patient to be seen in the outpatient clinic on the first postoperative day.

## 2025-02-11 NOTE — DISCHARGE INSTRUCTIONS
POST-OPERATIVE CATARACT DROP INSTRUCTIONS    Do not start any post operative drops in the operative eye today. Please keep the shield on at all times until you see Dr. Barbosa.  Please bring all of your drops with you to your post-op appointment.    If you have eye pain tonight, take 1 pill of the Acetazolamide (Diamox) 500 mg before bedtime as well as some Tylenol.  ---------------------------------------------------------------------------------------    Continue all drops in the other eye as before.    Wait 5 minutes between the drops. The order of the drops is not important.    For the prednisolone, please shake the bottle before applying the drop    No eye make-up for 2 weeks  No heavy lifting, bending or straining for 10 days  Do not rub your eyes for 1 month  Do not get water in your eyes for 1 week  No swimming for 1 month  Please sleep with the shield over your eye for the next week to protect your eye during sleep    If you experience any increasing redness, sensitivity to light, pain, or changes in vision, please call the office immediately.    Elida Barbosa MD  Office number: [phone]

## 2025-02-11 NOTE — PLAN OF CARE
Chart reviewed. Preop nursing care completed per orders. Safe surgery checklist complete. Pt denies any open wounds cuts or sores. Pt denies any metal in body. Belongings under stretcher. Waiting for anesthesia consent prior to surgery. Pt AAOX4, VSS on room air. Pt toileted, Bed locked in lowest position, Call light within reach. Pt denies any needs at this time.

## 2025-02-11 NOTE — ANESTHESIA PREPROCEDURE EVALUATION
02/11/2025  Aleksandra Santana is a 74 y.o., female.      Pre-op Assessment    I have reviewed the Patient Summary Reports.     I have reviewed the Nursing Notes.    I have reviewed the Medications.     Review of Systems  Anesthesia Hx:  No problems with previous Anesthesia             Denies Family Hx of Anesthesia complications.    Denies Personal Hx of Anesthesia complications.                    Cardiovascular:  Exercise tolerance: good                     Functional Capacity good / => 4 METS                         Procedure: PHACOEMULSIFICATION, CATARACT, WITH IOL INSERTION (Right)         Patient Active Problem List   Diagnosis    Hypertension    Hypothyroidism    Menopausal state    OA (osteoarthritis)    SOB (shortness of breath)    Abnormal EKG    Flushing    Common migraine    IBS (irritable bowel syndrome)    Cervical syndrome    GERD (gastroesophageal reflux disease)    History of colonic polyps    Osteopenia    Palpitations    Irritable bowel syndrome with constipation    Left-sided low back pain without sciatica    Poor motor control of trunk    Weakness of both hips    Atrophic vaginitis    Anxiety    Chronic constipation    Incomplete bladder emptying    Mixed incontinence urge and stress (male)(female)    Hot flashes    Weakness of pelvic floor    Weakness of trunk musculature    ALIVIA (stress urinary incontinence, female)    Migraine without aura and without status migrainosus, not intractable    Osteoporosis, postmenopausal    Anemia    Nuclear sclerosis of both eyes    Refractive error    Acute URI    Acute pain of left shoulder    Pelvic floor dysfunction    Aortic atherosclerosis    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 cataract, right eye       Past Medical History:   Diagnosis Date    Abnormal EKG     Allergy     seasonal    Anxiety     Breast cyst     Cataract     Fibrocystic breast     GERD  (gastroesophageal reflux disease)     Glaucoma     History of colonic polyps     1 polyp 11/14 --5 yrs.    Hypertension     Hypothyroidism     IBS (irritable bowel syndrome)     Keloid cicatrix     Migraine syndrome     OA (osteoarthritis)     Osteopenia     BMD 5/14 --no tx -repeat 2 yrs    Osteoporosis, postmenopausal     Palpitations     SOB (shortness of breath)        ECHO: No results found for this or any previous visit.      Body mass index is 26.26 kg/m².    Tobacco Use: Low Risk  (2/11/2025)    Patient History     Smoking Tobacco Use: Never     Smokeless Tobacco Use: Never     Passive Exposure: Not on file       Social History     Substance and Sexual Activity   Drug Use No        Alcohol Use: Not on file       Review of patient's allergies indicates:   Allergen Reactions    Codeine     Iodine and iodide containing products     Iodinated contrast media      Rash (skin)^  Rash (skin)^    Mobic [meloxicam]     Clindamycin Rash    Doxycycline Rash         Airway:  No value filed.     Physical Exam  General: Alert and Oriented    Airway:  Mouth Opening: Normal  TM Distance: Normal  Neck ROM: Normal ROM        Anesthesia Plan  Type of Anesthesia, risks & benefits discussed:    Anesthesia Type: MAC  Intra-op Monitoring Plan: Standard ASA Monitors  Induction:  IV  Informed Consent: Informed consent signed with the Patient and all parties understand the risks and agree with anesthesia plan.  All questions answered.   ASA Score: 2  Day of Surgery Review of History & Physical: H&P Update referred to the surgeon/provider.    Ready For Surgery From Anesthesia Perspective.     .

## 2025-02-11 NOTE — DISCHARGE SUMMARY
[facility]  Discharge Note  Short Stay    Procedure(s) (LRB):  PHACOEMULSIFICATION, CATARACT, WITH IOL INSERTION (Right)    OUTCOME: Patient tolerated treatment/procedure well without complication and is now ready for discharge.    DISPOSITION: Home or Self Care    FINAL DIAGNOSIS:  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 cataract, right eye    FOLLOWUP: In clinic    DISCHARGE INSTRUCTIONS:  No discharge procedures on file.     TIME SPENT ON DISCHARGE: 5 minutes

## 2025-02-12 ENCOUNTER — OFFICE VISIT (OUTPATIENT)
Dept: OPHTHALMOLOGY | Facility: CLINIC | Age: 75
End: 2025-02-12
Payer: MEDICARE

## 2025-02-12 DIAGNOSIS — Z98.41 STATUS POST CATARACT EXTRACTION AND INSERTION OF INTRAOCULAR LENS, RIGHT: Primary | ICD-10-CM

## 2025-02-12 DIAGNOSIS — Z96.1 STATUS POST CATARACT EXTRACTION AND INSERTION OF INTRAOCULAR LENS, RIGHT: Primary | ICD-10-CM

## 2025-02-12 PROCEDURE — 99999 PR PBB SHADOW E&M-EST. PATIENT-LVL I: CPT | Mod: PBBFAC,,, | Performed by: STUDENT IN AN ORGANIZED HEALTH CARE EDUCATION/TRAINING PROGRAM

## 2025-02-12 PROCEDURE — 99024 POSTOP FOLLOW-UP VISIT: CPT | Mod: S$GLB,,, | Performed by: STUDENT IN AN ORGANIZED HEALTH CARE EDUCATION/TRAINING PROGRAM

## 2025-02-12 PROCEDURE — 1126F AMNT PAIN NOTED NONE PRSNT: CPT | Mod: CPTII,S$GLB,, | Performed by: STUDENT IN AN ORGANIZED HEALTH CARE EDUCATION/TRAINING PROGRAM

## 2025-02-12 PROCEDURE — 1101F PT FALLS ASSESS-DOCD LE1/YR: CPT | Mod: CPTII,S$GLB,, | Performed by: STUDENT IN AN ORGANIZED HEALTH CARE EDUCATION/TRAINING PROGRAM

## 2025-02-12 PROCEDURE — 3288F FALL RISK ASSESSMENT DOCD: CPT | Mod: CPTII,S$GLB,, | Performed by: STUDENT IN AN ORGANIZED HEALTH CARE EDUCATION/TRAINING PROGRAM

## 2025-02-12 NOTE — PROGRESS NOTES
Subjective:  HPI    DLS: 1/08/2025    Pt here for 1 day post phaco w/IOL OD- 2/11/2025  Pt c/o pain and FB sensation to the OD. Pt states she hasn't started using   the eye drops yet.     Meds;  Cosopt BID OU  Xalatan QHS OU      Last edited by Gracie Pete on 2/12/2025  1:20 PM.        Exam:  See encounter report for full exam    Assessment:  1. Chronic angle-closure glaucoma of right eye, indeterminate stage  2. Chronic angle-closure glaucoma of left eye, indeterminate stage  OD>>OS  Mild hyperopia  - Referral from: Dr. Verdin Establishing me 1/2025.  - PMH: hypothyroidism, osteoporosis, headaches, HLD, depression  - Synopsis: diagnosed with glaucoma 2/2024  - H/o migraines  - Surg hx: none   - Laser hx: none  - Glaucoma FHx: denies  -  / 620  - Gonio: closed OD, narrow OS (Coulon 1/2025)  - Tmax: 32/21  - Target IOP: pending  - Med adverse effects: n/a  - Medication hx: latanoprost 2/2024, cosopt 12/2024    Baseline photos pending    02/12/2025  IOP 32/15 on Xalatan qhs OU, stopped Cosopt due to burning  HVF not scheduled  OCT 2/2024 reviewed: thinning c/w glaucoma OD>OS  OCT today: sup<inf thinning, avg 56 OD  ST thinning, avg 84 -- baseline BMO, appears worse c/w RNFL OU 2/2024    3.Pseudophakia, right eye  4. 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 cataract, left eye  Visually significant OD>OS, causing phacomorphic component of angle closure  - Guttae, PXF, or phacodonesis: none  - H/o LASIK/PRK, RK, or retinal surgery: none  - Dilation: deferred/very narrow  - Target: plano  - Astigmatism: none, no toric, understands need for reading glasses  - Special needs: Trypan  - Lens: OD DIBOO 24.5, MA60AC 23.5; OS DIBOO 25.5, MA60AC 24.5  - Veracity predicted MRx: OD -0.43 + 0.34 x 144° (SE -0.26); OS -0.54 + 0.50 x 10° (SE -0.29)    02/12/2025  POD#1 s/p complex phaco/IOL OD (IFIS) 2/11/25    5. Dry eye syndrome of both eyes  ATs PRN    Plan:  IOP above goal OD, narrow angles with phacomorphic component OD>OS. Discussed  phaco vs LPI, recommend phaco OD then OS.    Today: POD#1 s/p complex phaco/IOL OD (IFIS) 2/11/25  - Continue Cosopt 2/2, Xalatan qhs/qhs  - Continue PF q1-2H, Acular 4-3-2-1 OD qweekly then stop  - Continue Moxi 4x OD x 7 days then stop  - Update HVF post phaco    Today's visit is associated with current and anticipated ongoing medical care related to this patient's single serious/complex condition (glaucoma). Follow up is to be continued indefinitely to monitor the condition.    Return POW#1 -- [facility]    Elida Barbosa MD  [facility]

## 2025-02-17 ENCOUNTER — TELEPHONE (OUTPATIENT)
Dept: OPHTHALMOLOGY | Facility: CLINIC | Age: 75
End: 2025-02-17
Payer: MEDICARE

## 2025-02-17 NOTE — TELEPHONE ENCOUNTER
----- Message from Marian sent at 2/17/2025  2:15 PM CST -----  Contact: [phone]  Type:  Needs Medical AdviceWho Called: DianeCalled stating her right eye appears to be blood shot and feels like she has cold in it Inner corner was a little crusted this morning Would the patient rather a call back or a response via [facility]? Call back Best Call Back Number: [phone]

## 2025-02-19 ENCOUNTER — OFFICE VISIT (OUTPATIENT)
Dept: OPHTHALMOLOGY | Facility: CLINIC | Age: 75
End: 2025-02-19
Payer: MEDICARE

## 2025-02-19 DIAGNOSIS — Z98.41 STATUS POST CATARACT EXTRACTION AND INSERTION OF INTRAOCULAR LENS, RIGHT: Primary | ICD-10-CM

## 2025-02-19 DIAGNOSIS — Z96.1 STATUS POST CATARACT EXTRACTION AND INSERTION OF INTRAOCULAR LENS, RIGHT: Primary | ICD-10-CM

## 2025-02-19 RX ORDER — BRIMONIDINE TARTRATE 2 MG/ML
1 SOLUTION/ DROPS OPHTHALMIC 3 TIMES DAILY
Qty: 5 ML | Refills: 1 | Status: SHIPPED | OUTPATIENT
Start: 2025-02-19 | End: 2026-02-19

## 2025-02-19 RX ORDER — ACETAZOLAMIDE 250 MG/1
250 TABLET ORAL 2 TIMES DAILY
Qty: 28 TABLET | Refills: 0 | Status: SHIPPED | OUTPATIENT
Start: 2025-02-19 | End: 2025-03-05

## 2025-02-19 NOTE — PROGRESS NOTES
Subjective:  HPI    DLS: 2/12/25 w/ Dr. Barbosa    74 y.o. female presents for 1 week post op. States vision OD is still   clouded. Denies eye pain. Has been having migraines on right side. Has   floaters OS. Cmplains of FBS OD.    Meds:  Cosopt BID OU  Xalatan QHS OU  Prednisolone q1-2 hrs OD  Moxifloxacin QID OD  Ketorolac QID OD    Last edited by Rhonda Gruber on 2/19/2025 11:14 AM.        Exam:  See encounter report for full exam    Assessment:  1. Chronic angle-closure glaucoma of right eye, indeterminate stage  2. Chronic angle-closure glaucoma of left eye, indeterminate stage  OD>>OS  Mild hyperopia  - Referral from: Dr. Verdin Establishing me 1/2025.  - PMH: hypothyroidism, osteoporosis, headaches, HLD, depression  - Synopsis: diagnosed with glaucoma 2/2024  - H/o migraines  - Surg hx: none   - Laser hx: none  - Glaucoma FHx: denies  -  / 620  - Gonio: closed OD, narrow OS (Familia 1/2025)  - Tmax: 32/21  - Target IOP: pending  - Med adverse effects: n/a  - Medication hx: latanoprost 2/2024, cosopt 12/2024    Baseline photos pending    Last:  HVF not scheduled  OCT 2/2024 reviewed: thinning c/w glaucoma OD>OS  OCT today: sup<inf thinning, avg 56 OD  ST thinning, avg 84 -- baseline BMO, appears worse c/w RNFL OU 2/2024    3. Pseudophakia, right eye  4. 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 cataract, left eye  Visually significant OD>OS, causing phacomorphic component of angle closure  - Guttae, PXF, or phacodonesis: none  - H/o LASIK/PRK, RK, or retinal surgery: none  - Dilation: deferred/very narrow  - Target: plano  - Astigmatism: none, no toric, understands need for reading glasses  - Special needs: Trypan  - Lens: OD DIBOO 24.5, MA60AC 23.5; OS DIBOO 25.5, MA60AC 24.5  - Veracity predicted MRx: OD -0.43 + 0.34 x 144° (SE -0.26); OS -0.54 + 0.50 x 10° (SE -0.29)    02/19/2025  POW#1 s/p complex phaco/IOL OD (IFIS) 2/11/25  IOP still elevated-- discussed could be 2/2 inflammation from complex sx vs PAS formation  (poor gonio view today)  AC tap performed with post IOP 21 (pre and post betadine and moxi)    5. Dry eye syndrome of both eyes  ATs PRN    Plan:  IOP above goal OD, narrow angles with phacomorphic component OD>OS. Discussed phaco vs LPI, recommend phaco OD then OS.    Today: POW#1 s/p complex phaco/IOL OD (IFIS) 2/11/25  - Continue Cosopt 2/2, Xalatan qhs/qhs  - Start Brim 3/0  - Start  mg PO BID  - Continue PF 6x/day OD until next visit  - Continue Moxi 4x OD x 3 days then stop  - Update HVF post phaco    Today's visit is associated with current and anticipated ongoing medical care related to this patient's single serious/complex condition (glaucoma). Follow up is to be continued indefinitely to monitor the condition.    Return 2 days -- [facility]    Elida Barbosa MD  [facility]

## 2025-02-19 NOTE — PATIENT INSTRUCTIONS
POST OP DROPS:    Oral pills  Acetazolamide (DIAMOX): 1 pill twice daily     In the RIGHT eye:  Prednisolone Acetate (PINK or WHITE top)  SHAKE then place 1 drop 6x daily    Moxifloxacin (TAN top)  Place 1 drop 3 times daily for 3 days then stop.    GLAUCOMA DROPS:  Drug Eye Top Color Breakfast Lunch Dinner Bedtime   Alphagan:  Brimonidine RIGHT Purple X X X    Cosopt:  Dorzolamide/  Timolol BOTH Blue X  X    Xalatan:  Latanoprost BOTH Teal    X     WAIT 5 MINUTES BETWEEN DROPS. THE ORDER OF THE DROPS DOES NOT MATTER.    No heavy lifting, bending or straining for 10 days  Do not rub your eyes for 1 month  Do not get water in your eyes for 1 week  No swimming for 1 month  Please sleep with the shield over your eye for the next week to protect your eye during sleep    If you experience any increasing redness, sensitivity to light, pain, or changes in vision, please call the office immediately.    Dottie Marroquin MA  Office number: [phone]

## 2025-02-21 ENCOUNTER — OFFICE VISIT (OUTPATIENT)
Dept: OPHTHALMOLOGY | Facility: CLINIC | Age: 75
End: 2025-02-21
Payer: MEDICARE

## 2025-02-21 DIAGNOSIS — Z98.41 STATUS POST CATARACT EXTRACTION AND INSERTION OF INTRAOCULAR LENS, RIGHT: Primary | ICD-10-CM

## 2025-02-21 DIAGNOSIS — Z96.1 STATUS POST CATARACT EXTRACTION AND INSERTION OF INTRAOCULAR LENS, RIGHT: Primary | ICD-10-CM

## 2025-02-21 NOTE — PROGRESS NOTES
Subjective:  HPI    DLs: 2/19/2025    Pt here for post phaco w/IOL OD- 2/11/2025  Pt states it does have a pinching sensation to the OD and feels vision is   a little bit better but still a little cloudy. Pt states the Diamox makes   her feel a little dizzy, aggravated and off balance which she didn't take   the Diamox pill this AM yet.       Meds:  Diamox BID PO  PA 6 x DAY OD  Moxifloxacin TID OD for 3 days  Brimonidine TID OD  Dorzolamide-Timolol BID OU  Latanoprost QHS OU      Last edited by Gracie Pete on 2/21/2025 10:23 AM.        Exam:  See encounter report for full exam    Assessment:  1. Chronic angle-closure glaucoma of right eye, indeterminate stage  2. Chronic angle-closure glaucoma of left eye, indeterminate stage  OD>>OS  Mild hyperopia  - Referral from: Dr. Verdin Establishing me 1/2025.  - PMH: hypothyroidism, osteoporosis, headaches, HLD, depression  - Synopsis: diagnosed with glaucoma 2/2024  - H/o migraines  - Surg hx: none   - Laser hx: none  - Glaucoma FHx: denies  -  / 620  - Gonio: closed OD, narrow OS (Coulon 1/2025)  - Tmax: 32/21  - Target IOP: pending  - Med adverse effects: n/a  - Medication hx: latanoprost 2/2024, cosopt 12/2024    Baseline photos pending    Last:  HVF not scheduled  OCT 2/2024 reviewed: thinning c/w glaucoma OD>OS  OCT today: sup<inf thinning, avg 56 OD  ST thinning, avg 84 -- baseline BMO, appears worse c/w RNFL OU 2/2024    3. Pseudophakia, right eye  4. 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 cataract, left eye  Visually significant OD>OS, causing phacomorphic component of angle closure  - Guttae, PXF, or phacodonesis: none  - H/o LASIK/PRK, RK, or retinal surgery: none  - Dilation: deferred/very narrow  - Target: plano  - Astigmatism: none, no toric, understands need for reading glasses  - Special needs: Trypan  - Lens: OD DIBOO 24.5, MA60AC 23.5; OS DIBOO 25.5, MA60AC 24.5  - Veracity predicted MRx: OD -0.43 + 0.34 x 144° (SE -0.26); OS -0.54 + 0.50 x 10° (SE  -0.29)    02/21/2025  POW#1 s/p complex phaco/IOL OD (IFIS) 2/11/25  Doing much better today, VA and IOP improved    5. Dry eye syndrome of both eyes  ATs PRN    Plan:  IOP above goal OD, narrow angles with phacomorphic component OD>OS. Discussed phaco vs LPI, recommend phaco OD then OS.    Today: POW#1 s/p complex phaco/IOL OD (IFIS) 2/11/25  - Continue Cosopt 2/2, Xalatan qhs/qhs, Brim 3/0  - HOLD  mg PO BID  - Continue PF 6x/day OD until next visit  - Update HVF post phaco    Today's visit is associated with current and anticipated ongoing medical care related to this patient's single serious/complex condition (glaucoma). Follow up is to be continued indefinitely to monitor the condition.    Return 1 week -- VA, IOP    Elida Barbosa MD  [facility]

## 2025-02-26 ENCOUNTER — OFFICE VISIT (OUTPATIENT)
Dept: OPHTHALMOLOGY | Facility: CLINIC | Age: 75
End: 2025-02-26
Payer: MEDICARE

## 2025-02-26 DIAGNOSIS — Z96.1 STATUS POST CATARACT EXTRACTION AND INSERTION OF INTRAOCULAR LENS, RIGHT: Primary | ICD-10-CM

## 2025-02-26 DIAGNOSIS — Z98.41 STATUS POST CATARACT EXTRACTION AND INSERTION OF INTRAOCULAR LENS, RIGHT: Primary | ICD-10-CM

## 2025-02-26 PROCEDURE — 1101F PT FALLS ASSESS-DOCD LE1/YR: CPT | Mod: CPTII,S$GLB,, | Performed by: STUDENT IN AN ORGANIZED HEALTH CARE EDUCATION/TRAINING PROGRAM

## 2025-02-26 PROCEDURE — 3288F FALL RISK ASSESSMENT DOCD: CPT | Mod: CPTII,S$GLB,, | Performed by: STUDENT IN AN ORGANIZED HEALTH CARE EDUCATION/TRAINING PROGRAM

## 2025-02-26 PROCEDURE — 99024 POSTOP FOLLOW-UP VISIT: CPT | Mod: S$GLB,,, | Performed by: STUDENT IN AN ORGANIZED HEALTH CARE EDUCATION/TRAINING PROGRAM

## 2025-02-26 NOTE — PROGRESS NOTES
Subjective:  HPI    DLS: 2/21/2025    Pt here for post phaco w/IOL OD- 2/11/2025  Pt states no eye pain some discomfort in the OD and vision isn't as clear.   Pt states she's not feeling good today allergies is really bothering her   and feels off balance today.     Meds;  Cosopt BID OU  Xalatan QHS OU  Brimonidine TID OD  PF 6 x day OD      Last edited by Gracie Pete on 2/26/2025 11:00 AM.        Exam:  See encounter report for full exam    Assessment:  1. Chronic angle-closure glaucoma of right eye, indeterminate stage  2. Chronic angle-closure glaucoma of left eye, indeterminate stage  OD>>OS  Mild hyperopia  - Referral from: Dr. Verdin Establishing me 1/2025.  - PMH: hypothyroidism, osteoporosis, headaches, HLD, depression  - Synopsis: diagnosed with glaucoma 2/2024  - H/o migraines  - Surg hx: none   - Laser hx: none  - Glaucoma FHx: denies  -  / 620  - Gonio: closed OD, narrow OS (Coulon 1/2025)  - Tmax: 32/21  - Target IOP: pending  - Med adverse effects: n/a  - Medication hx: latanoprost 2/2024, cosopt 12/2024    Baseline photos pending    Last:  HVF not scheduled  OCT 2/2024 reviewed: thinning c/w glaucoma OD>OS  OCT today: sup<inf thinning, avg 56 OD  ST thinning, avg 84 -- baseline BMO, appears worse c/w RNFL OU 2/2024    3. Pseudophakia, right eye  4. 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 cataract, left eye  Visually significant OD>OS, causing phacomorphic component of angle closure  - Guttae, PXF, or phacodonesis: none  - H/o LASIK/PRK, RK, or retinal surgery: none  - Dilation: deferred/very narrow  - Target: plano  - Astigmatism: none, no toric, understands need for reading glasses  - Special needs: Trypan  - Lens: OD DIBOO 24.5, MA60AC 23.5; OS DIBOO 25.5, MA60AC 24.5  - Veracity predicted MRx: OD -0.43 + 0.34 x 144° (SE -0.26); OS -0.54 + 0.50 x 10° (SE -0.29)    02/26/2025  S/p complex phaco/IOL OD (IFIS) 2/11/25  Doing much better today, VA and IOP improved    5. Dry eye syndrome of both eyes  ATs  PRN    Plan:  IOP above goal OD, narrow angles with phacomorphic component OD>OS. Discussed phaco vs LPI, recommend phaco OD then OS.    Today: POW#1 s/p complex phaco/IOL OD (IFIS) 2/11/25  - Decrease to PF 4/0; start Eris 4/0  - Continue Cosopt 2/2, Xalatan qhs/qhs  - STOP Brim 3/0; HOLD  mg PO BID  - Update HVF post phaco    Today's visit is associated with current and anticipated ongoing medical care related to this patient's single serious/complex condition (glaucoma). Follow up is to be continued indefinitely to monitor the condition.    Return 2 weeks -- VA, IOP    Elida Barbosa MD  [facility]

## 2025-02-28 ENCOUNTER — TELEPHONE (OUTPATIENT)
Dept: OPHTHALMOLOGY | Facility: CLINIC | Age: 75
End: 2025-02-28
Payer: MEDICARE

## 2025-02-28 NOTE — TELEPHONE ENCOUNTER
----- Message from Sharyn sent at 2/28/2025  2:30 PM CST -----  Contact: pt @ [phone]  Aleksandra Santana calling regarding Patient Advice (message) for #pt is calling to speak with nurse concerning her eye, asking for call back

## 2025-02-28 NOTE — TELEPHONE ENCOUNTER
Pt states that her eye started using Eris drops and eye started tearing with burning sensation and redness. Pt thought it was from drops and so she stopped using them and eye got better. However, now she is having same symptoms and also sneezing and itching. Pt states she does have allergies and will take a antihistamine and go back on Eris drops. Pt will call back if symptoms persist or worsen

## 2025-03-08 ENCOUNTER — OFFICE VISIT (OUTPATIENT)
Dept: URGENT CARE | Facility: CLINIC | Age: 75
End: 2025-03-08
Payer: MEDICARE

## 2025-03-08 VITALS
TEMPERATURE: 98 F | RESPIRATION RATE: 18 BRPM | WEIGHT: 139 LBS | DIASTOLIC BLOOD PRESSURE: 80 MMHG | SYSTOLIC BLOOD PRESSURE: 151 MMHG | HEIGHT: 61 IN | OXYGEN SATURATION: 98 % | HEART RATE: 66 BPM | BODY MASS INDEX: 26.24 KG/M2

## 2025-03-08 DIAGNOSIS — L24.3 IRRITANT CONTACT DERMATITIS DUE TO COSMETICS: ICD-10-CM

## 2025-03-08 DIAGNOSIS — T78.40XA ALLERGIC REACTION, INITIAL ENCOUNTER: Primary | ICD-10-CM

## 2025-03-08 RX ORDER — PREDNISONE 10 MG/1
10 TABLET ORAL DAILY
Qty: 3 TABLET | Refills: 0 | Status: SHIPPED | OUTPATIENT
Start: 2025-03-08 | End: 2025-03-11

## 2025-03-08 NOTE — PROGRESS NOTES
"Subjective:      Patient ID: Aleksandra Santana is a 74 y.o. female.    Vitals:  height is 5' 1" (1.549 m) and weight is 63 kg (139 lb). Her oral temperature is 98 °F (36.7 °C). Her blood pressure is 151/80 (abnormal) and her pulse is 66. Her respiration is 18 and oxygen saturation is 98%.     Chief Complaint: Rash    Pt states she used an OTC face cream on Thursday and now have a rash on her face and neck that she noticed last night .     Rash  This is a new problem. The current episode started in the past 7 days. The problem is unchanged. The affected locations include the face and neck. The rash is characterized by redness and swelling. Past treatments include moisturizer. The treatment provided no relief.     Skin:  Positive for rash.    Objective:     Physical Exam    Assessment:     No diagnosis found.    Plan:       There are no diagnoses linked to this encounter.                "

## 2025-03-08 NOTE — PROGRESS NOTES
"Subjective:      Patient ID: Aleksandra Santana is a 74 y.o. female.    Vitals:  height is 5' 1" (1.549 m) and weight is 63 kg (139 lb). Her oral temperature is 98 °F (36.7 °C). Her blood pressure is 151/80 (abnormal) and her pulse is 66. Her respiration is 18 and oxygen saturation is 98%.     Chief Complaint: Rash    73 y/o female presents to the clinic with a chief complaint of rash since last night around 2230. Patient reports noticing redness to her cheeks prior to her shower. After her shower, she states the redness increased, as well as the swelling of her cheeks. Patient mention history of roscea, the dermatologist thought she might have 3 mos ago. Denies eye swelling or difficulty swallowing. Patient reports taking Xyzal last night for relief which she takes this daily. Pt has hx of glaucoma and cataract. She tried ice. She says puffy cheeks are about the same as yesterday. She denies any pain. She says her throat feels itchy. Denies any new medications, she has been able to tolerate PO, denies any cp or sob, or drooling. She did try a new cerave cream on her face Thursday and Friday.     Rash  This is a new problem. The current episode started in the past 7 days. The problem is unchanged. The affected locations include the face and neck. The rash is characterized by redness and swelling. Associated symptoms include a sore throat. Pertinent negatives include no anorexia, congestion, cough, diarrhea, eye pain, facial edema, fatigue, fever, joint pain, nail changes, rhinorrhea, shortness of breath or vomiting. Past treatments include moisturizer. The treatment provided no relief.       Constitution: Negative for activity change, appetite change, chills, sweating, fatigue, fever, unexpected weight change and generalized weakness.   HENT:  Positive for sore throat. Negative for ear pain, congestion, sinus pain and sinus pressure.    Neck: Negative for neck pain, neck stiffness and neck swelling.   Cardiovascular:  " Negative for chest pain.   Eyes:  Negative for eye pain, vision loss and eyelid swelling.   Respiratory:  Negative for chest tightness, cough and shortness of breath.    Gastrointestinal:  Negative for vomiting and diarrhea.   Skin:  Positive for rash (cheeks and anterior neck) and erythema (flushing).   Neurological:  Negative for loss of consciousness.      Objective:     Physical Exam   Constitutional: She is oriented to person, place, and time. She appears well-developed.  Non-toxic appearance. She does not appear ill. No distress.      Comments:No drooling, speaking in complete sentences without distress.     HENT:   Head: Normocephalic and atraumatic.       Ears:   Right Ear: External ear normal.   Left Ear: External ear normal.   Nose: Nose normal.   Mouth/Throat: Uvula is midline, oropharynx is clear and moist and mucous membranes are normal. No trismus in the jaw. Normal dentition. No uvula swelling or dental caries.   Noted redness to MCKENZIE cheeks and anterior neck. Localized warmth to areas       Comments: Noted redness to MCKENZIE cheeks and anterior neck. Localized warmth to areas   Eyes: Conjunctivae, EOM and lids are normal.   Neck: Trachea normal and phonation normal. Neck supple.   Cardiovascular: Normal rate.   Pulmonary/Chest: Effort normal and breath sounds normal.   Musculoskeletal: Normal range of motion.         General: Normal range of motion.   Neurological: She is alert and oriented to person, place, and time.   Skin: Skin is warm, dry, intact, not diaphoretic and rash (erythema to cheeks and neck). erythema (flushing)   Psychiatric: Her speech is normal and behavior is normal. Judgment and thought content normal.   Nursing note and vitals reviewed.        Assessment:     1. Allergic reaction, initial encounter    2. Irritant contact dermatitis due to cosmetics        Plan:     Seems to be irritant dermatitis from the CeraVe that she used.  We can try a low-dose steroid.  Also follow-up with  Dermatology or Internal Medicine if no improvement.    I have discussed in detail with pt the side effects of steroids including mental changes, sleep disturbance, skin changes at the injection site, suicidal ideations, increased blood pressure, increased glucose, and DVT and PE.      Discussed results/diagnosis/plan with patient in clinic. Strict precautions given to patient to monitor for worsening signs and symptoms. Advised to follow up with PCP or specialist.    Explained side effects of medications prescribed with patient and informed him/her to discontinue use if he/she has any side effects and to inform UC or PCP if this occurs. All questions answered. Strict ED verses clinic return precautions stressed and given in depth. Advised if symptoms worsens of fail to improve he/she should go to the Emergency Room. Discharge and follow-up instructions given verbally/printed with the patient who expressed understanding and willingness to comply with my recommendations. Patient voiced understanding and in agreement with current treatment plan. Patient exits the exam room in no acute distress. Conversant and engaged during discharge discussion, verbalized understanding.      Allergic reaction, initial encounter  -     predniSONE (DELTASONE) 10 MG tablet; Take 1 tablet (10 mg total) by mouth once daily. for 3 days  Dispense: 3 tablet; Refill: 0    Irritant contact dermatitis due to cosmetics  -     predniSONE (DELTASONE) 10 MG tablet; Take 1 tablet (10 mg total) by mouth once daily. for 3 days  Dispense: 3 tablet; Refill: 0

## 2025-03-08 NOTE — PATIENT INSTRUCTIONS
General Discharge Instructions   PLEASE READ YOUR DISCHARGE INSTRUCTIONS ENTIRELY AS IT CONTAINS IMPORTANT INFORMATION.  If you were prescribed a narcotic or controlled medication, do not drive or operate heavy equipment or machinery while taking these medications.  If you were prescribed antibiotics, please take them to completion.  You must understand that you've received an Urgent Care treatment only and that you may be released before all your medical problems are known or treated. You, the patient, will arrange for follow up care as instructed.    OVER THE COUNTER RECOMMENDATIONS/SUGGESTIONS.    Make sure to stay well hydrated.    Use Nasal Saline to mechanically move any post nasal drip from your eustachian tube or from the back of your throat.    Use warm salt water gargles to ease your throat pain. Warm salt water gargles as needed for sore throat- 1/2 tsp salt to 1 cup warm water, gargle as desired.    Use an antihistamine such as Claritin, Zyrtec or Allegra to dry you out.    Use pseudoephedrine (behind the counter) to decongest. Pseudoephedrine 30 mg up to 240 mg /day. It can raise your blood pressure and give you palpitations.    Use mucinex (guaifenesin) to break up mucous up to 2400mg/day to loosen any mucous.    The mucinex DM pill has a cough suppressant that can be sedating. It can be used at night to stop the tickle at the back of your throat.    You can use Mucinex D (it has guaifenesin and a high dose of pseudoephedrine) in the mornings to help decongest.    Use Afrin in each nare for no longer than 3 days, as it is addictive. It can also dry out your mucous membranes and cause elevated blood pressure. This is especially useful if you are flying.    Use Flonase 1-2 sprays/nostril per day. It is a local acting steroid nasal spray, if you develop a bloody nose, stop using the medication immediately.    Sometimes Nyquil at night is beneficial to help you get some rest, however it is sedating and it  does have an antihistamine, and tylenol.    Honey is a natural cough suppressant that can be used.    Tylenol up to 4,000 mg a day is safe for short periods and can be used for body aches, pain, and fever. However in high doses and prolonged use it can cause liver irritation.    Ibuprofen is a non-steroidal anti-inflammatory that can be used for body aches, pain, and fever.However it can also cause stomach irritation if over used.     Follow up with your PCP or specialty clinic as instructed in the next 2-3 days if not improved or as needed. You can call [phone] to schedule an appointment with appropriate provider.      If you condition worsens, we recommend that you receive another evaluation at the emergency room immediately or contact your primary medical clinic's after hours call service to discuss your concerns.      Please return here or go to the Emergency Department for any concerns or worsening condition.   You can also call [phone] to schedule an appointment with the appropriate provider.    Please return here or go to the Emergency Department for any concerns or worsening of condition.    Thank you for choosing [facility]!    Our goal in the Urgent Care is to always provide outstanding medical care. You may receive a survey by mail or e-mail in the next week regarding your experience today. We would greatly appreciate you completing and returning the survey. Your feedback provides us with a way to recognize our staff who provide very good care, and it helps us learn how to improve when your experience was below our aspiration of excellence.      We appreciate you trusting us with your medical care. We hope you feel better soon. We will be happy to take care of you for all of your future medical needs.    Sincerely,    CHIO Townsend                                             Dermatitis   If your condition worsens or fails to improve we recommend that you receive another  evaluation at the ER immediately or contact your PCP to discuss your concerns or return here. You must understand that you've received an urgent care treatment only and that you may be released before all your medical problems are known or treated. You the patient will arrange for followup care as instructed.   Increase fluids and rest are important  Apply topical steroid cream as prescribed.  Do not take over 5 days.    Please take over the counter Pepcid daily as directed for the next 48-72 hours as needed.  Please take Claritin or Zyrtec or Allegra (24 hours) daily for the next 48-72 hrs.  You can add Benadryl  as needed for itching, however these may make you drowsy, so do not  drive or operate heavy equipment or machinery while taking these medications.    If you develop additional symptoms such as tongue swelling or trouble breathing go immediately to the ER.

## 2025-03-13 ENCOUNTER — OFFICE VISIT (OUTPATIENT)
Dept: OPHTHALMOLOGY | Facility: CLINIC | Age: 75
End: 2025-03-13
Payer: MEDICARE

## 2025-03-13 DIAGNOSIS — Z96.1 STATUS POST CATARACT EXTRACTION AND INSERTION OF INTRAOCULAR LENS, RIGHT: Primary | ICD-10-CM

## 2025-03-13 DIAGNOSIS — Z98.41 STATUS POST CATARACT EXTRACTION AND INSERTION OF INTRAOCULAR LENS, RIGHT: Primary | ICD-10-CM

## 2025-03-13 PROCEDURE — 1159F MED LIST DOCD IN RCRD: CPT | Mod: CPTII,S$GLB,, | Performed by: STUDENT IN AN ORGANIZED HEALTH CARE EDUCATION/TRAINING PROGRAM

## 2025-03-13 PROCEDURE — 99999 PR PBB SHADOW E&M-EST. PATIENT-LVL IV: CPT | Mod: PBBFAC,,, | Performed by: STUDENT IN AN ORGANIZED HEALTH CARE EDUCATION/TRAINING PROGRAM

## 2025-03-13 PROCEDURE — 99024 POSTOP FOLLOW-UP VISIT: CPT | Mod: S$GLB,,, | Performed by: STUDENT IN AN ORGANIZED HEALTH CARE EDUCATION/TRAINING PROGRAM

## 2025-03-13 NOTE — PATIENT INSTRUCTIONS
POST-OPERATIVE DROP INSTRUCTIONS    In the RIGHT eye:  Prednisolone Acetate (PINK or WHITE top)   Place 1 drop 4 times daily until next visit    Eris  Place 1 drop 4 times daily until next visit    GLAUCOMA DROP INSTRUCTIONS  Drug Eye Top Color Breakfast Lunch Dinner Bedtime   Xalatan:  Latanoprost BOTH Teal    X     If you experience any increasing redness, sensitivity to light, pain, or changes in vision, please call the office immediately.    Elida Barbosa MD  Office number: [phone]

## 2025-03-13 NOTE — PROGRESS NOTES
Subjective:  HPI     Post-op Evaluation     Additional comments: 2 wk po chk           Comments    DLS: 2/26/2025    Pt here for post phaco w/IOL OD- 2/11/2025    Pt states her va seems to be a little better since last visit but still   cloudy.    Meds;  Cosopt BID OU  Xalatan QHS OU  PF QID OD  Latanoprost QHS OU              Last edited by Ramiro Cervantes on 3/13/2025 11:48 AM.        Exam:  See encounter report for full exam    Assessment:  1. Chronic angle-closure glaucoma of right eye, indeterminate stage  2. Chronic angle-closure glaucoma of left eye, indeterminate stage  OD>>OS  Mild hyperopia  - Referral from: Dr. Amezcua. Establishing me 1/2025.  - PMH: hypothyroidism, osteoporosis, headaches, HLD, depression  - Synopsis: diagnosed with glaucoma 2/2024  - H/o migraines  - Surg hx: none   - Laser hx: none  - Glaucoma FHx: denies  -  / 620  - Gonio: closed OD, narrow OS (Coulon 1/2025)  - Tmax: 32/21  - Target IOP: pending  - Med adverse effects: n/a  - Medication hx: latanoprost 2/2024, cosopt 12/2024    Baseline photos pending    Last:  HVF not scheduled  OCT 2/2024 reviewed: thinning c/w glaucoma OD>OS  OCT today: sup<inf thinning, avg 56 OD  ST thinning, avg 84 -- baseline BMO, appears worse c/w RNFL OU 2/2024    3. Pseudophakia, right eye  4. 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 cataract, left eye  Visually significant OD>OS, causing phacomorphic component of angle closure  - Guttae, PXF, or phacodonesis: none  - H/o LASIK/PRK, RK, or retinal surgery: none  - Dilation: deferred/very narrow  - Target: plano  - Astigmatism: none, no toric, understands need for reading glasses  - Special needs: Trypan  - Lens: OD DIBOO 24.5, MA60AC 23.5; OS DIBOO 25.5, MA60AC 24.5  - Veracity predicted MRx: OD -0.43 + 0.34 x 144° (SE -0.26); OS -0.54 + 0.50 x 10° (SE -0.29)    03/13/2025  S/p complex phaco/IOL OD (IFIS) 2/11/25  Doing much better today, VA and IOP improved  10-0 nylon removed from main wound today (pre and post  betadine and moxi)    5. Dry eye syndrome of both eyes  ATs PRN    Plan:  IOP above goal OD, narrow angles with phacomorphic component OD>OS. Discussed phaco vs LPI, recommend phaco OD then OS.    Today: S/p complex phaco/IOL OD (IFIS) 2/11/25  - Continue PF 4/0, Eris 4/0  - Continue Xalatan qhs/qhs  - STOP Cosopt 2/2  - STOP Brim 3/0; HOLD  mg PO BID  - Update HVF post phaco    Today's visit is associated with current and anticipated ongoing medical care related to this patient's single serious/complex condition (glaucoma). Follow up is to be continued indefinitely to monitor the condition.    Return 1 month -- VA, IOP, [facility], consider same day YAG OD    Elida Barbosa MD  [facility]

## 2025-04-15 ENCOUNTER — OFFICE VISIT (OUTPATIENT)
Dept: OPHTHALMOLOGY | Facility: CLINIC | Age: 75
End: 2025-04-15
Payer: MEDICARE

## 2025-04-15 DIAGNOSIS — Z96.1 STATUS POST CATARACT EXTRACTION AND INSERTION OF INTRAOCULAR LENS, RIGHT: ICD-10-CM

## 2025-04-15 DIAGNOSIS — Z98.41 STATUS POST CATARACT EXTRACTION AND INSERTION OF INTRAOCULAR LENS, RIGHT: ICD-10-CM

## 2025-04-15 DIAGNOSIS — H26.491 POSTERIOR CAPSULAR OPACIFICATION VISUALLY SIGNIFICANT, RIGHT: ICD-10-CM

## 2025-04-15 DIAGNOSIS — H40.2214 CHRONIC ANGLE-CLOSURE GLAUCOMA OF RIGHT EYE, INDETERMINATE STAGE: Primary | ICD-10-CM

## 2025-04-15 DIAGNOSIS — H25.812 COMBINED FORM OF AGE-RELATED CATARACT, LEFT EYE: ICD-10-CM

## 2025-04-15 PROCEDURE — 99024 POSTOP FOLLOW-UP VISIT: CPT | Mod: HCNC,S$GLB,, | Performed by: STUDENT IN AN ORGANIZED HEALTH CARE EDUCATION/TRAINING PROGRAM

## 2025-04-15 PROCEDURE — 99999 PR PBB SHADOW E&M-EST. PATIENT-LVL III: CPT | Mod: PBBFAC,HCNC,, | Performed by: STUDENT IN AN ORGANIZED HEALTH CARE EDUCATION/TRAINING PROGRAM

## 2025-04-15 PROCEDURE — 66821 AFTER CATARACT LASER SURGERY: CPT | Mod: 78,HCNC,RT,S$GLB | Performed by: STUDENT IN AN ORGANIZED HEALTH CARE EDUCATION/TRAINING PROGRAM

## 2025-04-15 PROCEDURE — 1159F MED LIST DOCD IN RCRD: CPT | Mod: HCNC,CPTII,S$GLB, | Performed by: STUDENT IN AN ORGANIZED HEALTH CARE EDUCATION/TRAINING PROGRAM

## 2025-04-15 NOTE — PROGRESS NOTES
Subjective:  HPI    DLS: 3/13/25    Pt here for post phaco w/IOL OD- 2/11/2025    Pt states VA is blurry OU still  No pain or discomfort    Meds;  Cosopt BID OU  Xalatan QHS OU  PF QID OD  Latanoprost QHS OU      Last edited by Amarilys Castillo on 4/15/2025 11:34 AM.        Exam:  See encounter report for full exam    Assessment:  1. Chronic angle-closure glaucoma of right eye, indeterminate stage  2. Chronic angle-closure glaucoma of left eye, indeterminate stage  OD>>OS  Mild hyperopia  - Referral from: Dr. Verdin Establishing me 1/2025.  - PMH: hypothyroidism, osteoporosis, headaches, HLD, depression  - Synopsis: diagnosed with glaucoma 2/2024  - H/o migraines  - Surg hx: none   - Laser hx: none  - Glaucoma FHx: denies  -  / 620  - Gonio: closed OD, narrow OS (Coulon 1/2025)  - Tmax: 32/21  - Target IOP: pending  - Med adverse effects: n/a  - Medication hx: latanoprost 2/2024, cosopt 12/2024    Baseline photos pending    Last:  HVF not scheduled  OCT 2/2024 reviewed: thinning c/w glaucoma OD>OS  OCT today: sup<inf thinning, avg 56 OD  ST thinning, avg 84 -- baseline BMO, appears worse c/w RNFL OU 2/2024    3. Pseudophakia, right eye  4. 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 cataract, left eye  Visually significant OD>OS, causing phacomorphic component of angle closure  - Guttae, PXF, or phacodonesis: none  - H/o LASIK/PRK, RK, or retinal surgery: none  - Dilation: deferred/very narrow  - Target: plano  - Astigmatism: none, no toric, understands need for reading glasses  - Special needs: Trypan  - Lens: OD DIBOO 24.5, MA60AC 23.5; OS DIBOO 25.5, MA60AC 24.5  - Veracity predicted MRx: OD -0.43 + 0.34 x 144° (SE -0.26); OS -0.54 + 0.50 x 10° (SE -0.29)    04/15/2025  S/p complex phaco/IOL OD (IFIS) 2/11/25  Still with blurry vision; proceed with YAG OD today to remove PCO/residual cortex    5. Dry eye syndrome of both eyes  ATs PRN    Plan:  IOP above goal OD, narrow angles with phacomorphic component OD>OS. Discussed  phaco vs LPI, recommend phaco OD then OS.    Today: S/p complex phaco/IOL OD (IFIS) 2/11/25  - Continue PF 4/0 x 1 week then stop  - STOP Eris 4/0  - Continue Xalatan qhs/qhs  - HOLD Cosopt 2/2, Brim 3/0,  mg PO BID  - Update HVF post phaco    Today's visit is associated with current and anticipated ongoing medical care related to this patient's single serious/complex condition (glaucoma). Follow up is to be continued indefinitely to monitor the condition.    Return 2 weeks -- OCT Mac, VA, IOP, Manifest Rx after I review OCT    Elida Barbosa MD  [facility]

## 2025-04-26 ENCOUNTER — TELEPHONE (OUTPATIENT)
Dept: OPHTHALMOLOGY | Facility: CLINIC | Age: 75
End: 2025-04-26
Payer: MEDICARE

## 2025-04-26 NOTE — TELEPHONE ENCOUNTER
Telephone encounter 4/26/2025 (12:38 PM):  Pt of Dr. Coulon called, hx of chronic angle closure glaucoma both eyes s/p complex phaco/IOL OD (IFIS) 2/11/25 and YAG cap OD 4/15/2025, tolerated procedure well. Completed 1 week of PF QID OD. Currently on Latanoprost only both eyes.    States new onset of pressure-like pain right eye that began last night that has not gotten better or worst by this morning. Also noticed redness to eye this morning. Denies flashes or floaters. Denies changes to vision. No increased sensitivity to light.    Given timeline, low suspicion for infection. Likely related to IOP but can not rule out post-YAG iritis. Discussed option to present to ED or restarting previous pressure lowering regimen that have been held. Elected to restart brimonidine TID and diamox 250 q daily. Will call patient back for symptom check with agreement will need ED evaluation if symptoms worsen or not improve. Patient expressed understanding.     Addendum:  Called patient back for symptom check. S/p 2 hours since diamox and drop of brimonidine. States redness in eye has significantly improved and some improvement in pressure-like pain. Still denies changes to vision. Discussed coming into ED now or monitoring for now. Patient elects to monitor with close follow-up next week. Instructed to present to ED over weekend if worsening of symptom or concern.    Vin Bay  PGY2, ophthalmology

## 2025-04-28 ENCOUNTER — TELEPHONE (OUTPATIENT)
Dept: OPHTHALMOLOGY | Facility: CLINIC | Age: 75
End: 2025-04-28
Payer: MEDICARE

## 2025-04-28 ENCOUNTER — OFFICE VISIT (OUTPATIENT)
Dept: OPHTHALMOLOGY | Facility: CLINIC | Age: 75
End: 2025-04-28
Payer: MEDICARE

## 2025-04-28 DIAGNOSIS — Z96.1 PSEUDOPHAKIA OF RIGHT EYE: Primary | ICD-10-CM

## 2025-04-28 DIAGNOSIS — H40.2234 BILATERAL CHRONIC ANGLE-CLOSURE GLAUCOMA, INDETERMINATE STAGE: ICD-10-CM

## 2025-04-28 DIAGNOSIS — Z00.00 ENCOUNTER FOR MEDICARE ANNUAL WELLNESS EXAM: ICD-10-CM

## 2025-04-28 DIAGNOSIS — H25.12 NUCLEAR SCLEROTIC CATARACT OF LEFT EYE: ICD-10-CM

## 2025-04-28 PROBLEM — H25.13 NUCLEAR SCLEROSIS OF BOTH EYES: Status: RESOLVED | Noted: 2019-02-06 | Resolved: 2025-04-28

## 2025-04-28 PROBLEM — H25.811 COMBINED FORM OF AGE-RELATED CATARACT, RIGHT EYE: Status: RESOLVED | Noted: 2025-02-11 | Resolved: 2025-04-28

## 2025-04-28 PROCEDURE — 3288F FALL RISK ASSESSMENT DOCD: CPT | Mod: CPTII,HCNC,S$GLB, | Performed by: OPHTHALMOLOGY

## 2025-04-28 PROCEDURE — 99024 POSTOP FOLLOW-UP VISIT: CPT | Mod: HCNC,S$GLB,, | Performed by: OPHTHALMOLOGY

## 2025-04-28 PROCEDURE — 1159F MED LIST DOCD IN RCRD: CPT | Mod: CPTII,HCNC,S$GLB, | Performed by: OPHTHALMOLOGY

## 2025-04-28 PROCEDURE — 1101F PT FALLS ASSESS-DOCD LE1/YR: CPT | Mod: CPTII,HCNC,S$GLB, | Performed by: OPHTHALMOLOGY

## 2025-04-28 PROCEDURE — 1160F RVW MEDS BY RX/DR IN RCRD: CPT | Mod: CPTII,HCNC,S$GLB, | Performed by: OPHTHALMOLOGY

## 2025-04-28 PROCEDURE — 1126F AMNT PAIN NOTED NONE PRSNT: CPT | Mod: CPTII,HCNC,S$GLB, | Performed by: OPHTHALMOLOGY

## 2025-04-28 PROCEDURE — 99999 PR PBB SHADOW E&M-EST. PATIENT-LVL III: CPT | Mod: PBBFAC,HCNC,, | Performed by: OPHTHALMOLOGY

## 2025-04-28 NOTE — PROGRESS NOTES
Subjective:  HPI    DLS: 4/15/25 w/ Dr. Barbosa    S/p phaco w/IOL OD- 2/11/2025    74 y.o. female presents for urgent visit. Patient states she began having   severe pain OD on Friday, worse Saturday morning with redness. States she   called and spoke with doctor on call who advised her to restart   brimonidine and diamox. States diamox caused blurred VA, lethargy,   numbness, and low blood pressure. States she only took diamox Saturday and   Sunday. Patient stopped prednisolone 1 week ago. Still having some pain   and cloudy VA OU. Denies headaches.    Meds;  Xalatan QHS OU  Brimonidine 3/0  Diamox 250 mg QD PO      Last edited by Rhonda Gruber on 4/28/2025  2:47 PM.          04/28/2025  Patient reports eye pain 4/25/2025 start @ 4 days p stopping PF 1% QID  Presents after phone call over weekend with eye pain // photophobia  Phone triage restarted Alphagan TID & diamox  Feels better  ==> Likely rebound iritis OD discussed    Adjust RX:  Right eye  PF 1% BID --> restart  Alphagan 3/0 --> 2/0  Diamox --> stop & intolerant  Has fu 1 day c Dr Barbosa & can tag team with [facility] [place] if wishes    =====================================================  =====================================================      IN PAST:      Exam:  See encounter report for full exam    Assessment:  1. Chronic angle-closure glaucoma of right eye, indeterminate stage  2. Chronic angle-closure glaucoma of left eye, indeterminate stage  OD>>OS  Mild hyperopia  - Referral from: Dr. Verdin Establishing me 1/2025.  - PMH: hypothyroidism, osteoporosis, headaches, HLD, depression  - Synopsis: diagnosed with glaucoma 2/2024  - H/o migraines  - Surg hx: none   - Laser hx: none  - Glaucoma FHx: denies  -  / 620  - Gonio: closed OD, narrow OS (Coulon 1/2025)  - Tmax: 32/21  - Target IOP: pending  - Med adverse effects: n/a  - Medication hx: latanoprost 2/2024, cosopt 12/2024    Baseline photos pending    Last:  HVF not scheduled  OCT 2/2024 reviewed:  "thinning c/w glaucoma OD>OS  OCT today: sup<inf thinning, avg 56 OD  ST thinning, avg 84 -- baseline BMO, appears worse c/w RNFL OU 2/2024    3. Pseudophakia, right eye  4. 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 cataract, left eye  Visually significant OD>OS, causing phacomorphic component of angle closure  - Guttae, PXF, or phacodonesis: none  - H/o LASIK/PRK, RK, or retinal surgery: none  - Dilation: deferred/very narrow  - Target: plano  - Astigmatism: none, no toric, understands need for reading glasses  - Special needs: Trypan  - Lens: OD DIBOO 24.5, MA60AC 23.5; OS DIBOO 25.5, MA60AC 24.5  - Veracity predicted MRx: OD -0.43 + 0.34 x 144° (SE -0.26); OS -0.54 + 0.50 x 10° (SE -0.29)    04/28/2025  S/p complex phaco/IOL OD (IFIS) 2/11/25  Still with blurry vision; proceed with YAG OD today to remove PCO/residual cortex    5. Dry eye syndrome of both eyes  ATs PRN    Plan:  IOP above goal OD, narrow angles with phacomorphic component OD>OS. Discussed phaco vs LPI, recommend phaco OD then OS.    Today: S/p complex phaco/IOL OD (IFIS) 2/11/25  - Continue PF 4/0 x 1 week then stop  - STOP Eris 4/0  - Continue Xalatan qhs/qhs  - HOLD Cosopt 2/2, Brim 3/0,  mg PO BID  - Update HVF post phaco    Today's visit is associated with current and anticipated ongoing medical care related to this patient's single serious/complex condition (glaucoma). Follow up is to be continued indefinitely to monitor the condition.        See my note above "In Past" Notes  Terrance Post MD  [facility]  "

## 2025-04-28 NOTE — TELEPHONE ENCOUNTER
----- Message from Vin Bay sent at 4/26/2025  3:22 PM CDT -----  Regarding: Outpatient follow-up  Hello,This patient called for eye pain, right eye. She is a patient of Dr. Barbosa w/ hx of chronic angle closure glaucoma both eyes s/p complex phaco/IOL OD (IFIS) 2/11/25 and YAG cap OD 4/15/2025. F/u already scheduled for 4/29/2025 w/ Dr. Barbosa. I'm not sure if Dr. Barbosa will be available that day.If not, can they please get follow-up in glaucoma clinic on 4/28 or 4/29?Best contact number: [phone] let me know if unable to schedule this patient.Thank you!MIGNON Chairez Ophthalmology PGY2  ----- Message -----  From: Vin Bay MD  Sent: 4/26/2025   3:24 PM CDT  To: [facility] Triage  Subject: Outpatient follow-up                             Hello,This patient called for eye pain, right eye. She is a patient of Dr. Barbosa w/ rosalio of chronic angle closure glaucoma both eyes s/p complex phaco/IOL OD (IFIS) 2/11/25 and YAG cap OD 4/15/2025. F/u already scheduled for 4/29/2025 w/ Dr. Barbosa. I'm not sure if Dr. Barbosa will be available that day.If not, can they please get follow-up in glaucoma clinic on 4/28 or 4/29?Best contact number: [phone] let me know if unable to schedule this patient.Thank you!MIGNON Chairez Ophthalmology PGY2

## 2025-04-29 ENCOUNTER — OFFICE VISIT (OUTPATIENT)
Dept: OPHTHALMOLOGY | Facility: CLINIC | Age: 75
End: 2025-04-29
Payer: MEDICARE

## 2025-04-29 DIAGNOSIS — H40.2214 CHRONIC ANGLE-CLOSURE GLAUCOMA OF RIGHT EYE, INDETERMINATE STAGE: Primary | ICD-10-CM

## 2025-04-29 DIAGNOSIS — Z96.1 STATUS POST CATARACT EXTRACTION AND INSERTION OF INTRAOCULAR LENS, RIGHT: ICD-10-CM

## 2025-04-29 DIAGNOSIS — H40.2224 CHRONIC ANGLE-CLOSURE GLAUCOMA OF LEFT EYE, INDETERMINATE STAGE: ICD-10-CM

## 2025-04-29 DIAGNOSIS — Z98.41 STATUS POST CATARACT EXTRACTION AND INSERTION OF INTRAOCULAR LENS, RIGHT: ICD-10-CM

## 2025-04-29 PROCEDURE — 99024 POSTOP FOLLOW-UP VISIT: CPT | Mod: HCNC,S$GLB,, | Performed by: STUDENT IN AN ORGANIZED HEALTH CARE EDUCATION/TRAINING PROGRAM

## 2025-04-29 PROCEDURE — 1101F PT FALLS ASSESS-DOCD LE1/YR: CPT | Mod: CPTII,HCNC,S$GLB, | Performed by: STUDENT IN AN ORGANIZED HEALTH CARE EDUCATION/TRAINING PROGRAM

## 2025-04-29 PROCEDURE — 1125F AMNT PAIN NOTED PAIN PRSNT: CPT | Mod: CPTII,HCNC,S$GLB, | Performed by: STUDENT IN AN ORGANIZED HEALTH CARE EDUCATION/TRAINING PROGRAM

## 2025-04-29 PROCEDURE — 3288F FALL RISK ASSESSMENT DOCD: CPT | Mod: CPTII,HCNC,S$GLB, | Performed by: STUDENT IN AN ORGANIZED HEALTH CARE EDUCATION/TRAINING PROGRAM

## 2025-04-29 PROCEDURE — 99999 PR PBB SHADOW E&M-EST. PATIENT-LVL III: CPT | Mod: PBBFAC,HCNC,, | Performed by: STUDENT IN AN ORGANIZED HEALTH CARE EDUCATION/TRAINING PROGRAM

## 2025-04-29 PROCEDURE — 1159F MED LIST DOCD IN RCRD: CPT | Mod: CPTII,HCNC,S$GLB, | Performed by: STUDENT IN AN ORGANIZED HEALTH CARE EDUCATION/TRAINING PROGRAM

## 2025-04-29 NOTE — Clinical Note
Thank you so much for your help with this patient.  Chronic angle closure glaucoma OD>OS, stage pending HVF post phaco. IOP high 20s/low 30s OD prior to phaco so did that eye first- impressive IFIS despite narrow angle precautions intra-op.  OS IOP has been fine so we will do that eye when I return unless something happens in the interim/during maternity.  Still had trace cell OD today with mild descemet's folds, [employer] was on target so I hope she can see a little better next visit with you. Going to have her keep PF QID OD until next visit with you in a few weeks.  Thanks again!!!

## 2025-05-14 DIAGNOSIS — Z96.1 PSEUDOPHAKIA OF RIGHT EYE: Primary | ICD-10-CM

## 2025-05-15 ENCOUNTER — OFFICE VISIT (OUTPATIENT)
Dept: OPHTHALMOLOGY | Facility: CLINIC | Age: 75
End: 2025-05-15
Payer: MEDICARE

## 2025-05-15 DIAGNOSIS — H21.261 IRIS ATROPHY, RIGHT: ICD-10-CM

## 2025-05-15 DIAGNOSIS — Z96.1 PSEUDOPHAKIA OF RIGHT EYE: ICD-10-CM

## 2025-05-15 DIAGNOSIS — H25.12 NUCLEAR SCLEROTIC CATARACT OF LEFT EYE: ICD-10-CM

## 2025-05-15 DIAGNOSIS — H53.10 SUBJECTIVE VISUAL DISTURBANCE OF RIGHT EYE: Primary | ICD-10-CM

## 2025-05-15 PROCEDURE — 99999 PR PBB SHADOW E&M-EST. PATIENT-LVL III: CPT | Mod: PBBFAC,HCNC,, | Performed by: OPHTHALMOLOGY

## 2025-05-15 RX ORDER — PREDNISOLONE ACETATE 10 MG/ML
SUSPENSION/ DROPS OPHTHALMIC
Qty: 5 ML | Refills: 1 | Status: SHIPPED | OUTPATIENT
Start: 2025-05-15

## 2025-05-15 NOTE — Clinical Note
RK : had floppy iris c CE IOL OD and now iris atrophy with visual complaints, consider Dx CTL with artificial pupil and potential trial. Thank you

## 2025-05-16 ENCOUNTER — PATIENT MESSAGE (OUTPATIENT)
Dept: OPTOMETRY | Facility: CLINIC | Age: 75
End: 2025-05-16
Payer: MEDICARE

## 2025-05-16 PROBLEM — H25.12 NUCLEAR SCLEROTIC CATARACT OF LEFT EYE: Status: ACTIVE | Noted: 2025-05-16

## 2025-05-16 PROBLEM — Z96.1 PSEUDOPHAKIA OF RIGHT EYE: Status: ACTIVE | Noted: 2025-05-16

## 2025-05-16 PROBLEM — H53.10 SUBJECTIVE VISUAL DISTURBANCE OF RIGHT EYE: Status: ACTIVE | Noted: 2025-05-16

## 2025-05-16 PROBLEM — H21.261: Status: ACTIVE | Noted: 2025-05-16

## 2025-05-16 NOTE — PROGRESS NOTES
Subjective:  HPI    DLS:04/29/2025 Familia  Pt is here today for 2 week IOP check OU.    Meds; Xalatan QHS OU   Brimonidine BID OD  PF QID OD        Last edited by Dilia Billingsley MA on 5/15/2025  3:30 PM.        Exam:  See encounter report for full exam    Assessment:  1. Chronic angle-closure glaucoma of right eye, indeterminate stage  2. Chronic angle-closure glaucoma of left eye, indeterminate stage  OD>>OS  Mild hyperopia  - Referral from: Dr. Verdin Establishing me 1/2025.  - PMH: hypothyroidism, osteoporosis, headaches, HLD, depression  - Synopsis: diagnosed with glaucoma 2/2024  - H/o migraines  - Surg hx:   Complex phaco/IOL (IFIS) 2/11/25 Familia - done for narrow AC/phacomorphic IOP  - Laser hx:    YAG OD 4/15/25 Familia  - Glaucoma FHx: denies  -  / 620  - Gonio: closed OD, narrow OS (Coulon 1/2025)  - Tmax: 32/21  - Target IOP: pending  - Med adverse effects: diamox (intolerant)  - Medication hx: latanoprost 2/2024, cosopt 12/2024    Baseline photos pending    Last:  HVF not scheduled  OCT 2/2024 reviewed: thinning c/w glaucoma OD>OS  OCT today: sup<inf thinning, avg 56 OD  ST thinning, avg 84 -- baseline BMO, appears worse c/w RNFL OU 2/2024    3. Pseudophakia, right eye  4. 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 cataract, left eye  Visually significant OS, causing phacomorphic component of angle closure  - Guttae, PXF, or phacodonesis: none  - Dilation: deferred/very narrow  - Lens: OD DIBOO 24.5, MA60AC 23.5; OS DIBOO 25.5, MA60AC 24.5  - Veracity predicted MRx: OD -0.43 + 0.34 x 144° (SE -0.26); OS -0.54 + 0.50 x 10° (SE -0.29)    05/16/2025  S/p complex phaco/IOL OD (IFIS) 2/11/25  U-care add on 4/28/25 with Dr. Nussdorf for rebound iritis after stopping PF, may require prolonged taper vs chronic anti-inflammatory discussed  Today: OCT mac wnl, AutoRx on target, but with trace cell/1+ DMF after stopping PF; wait on glasses until fully healed +/- sx OS    5. Dry eye syndrome of both eyes  Dry Eye  Syndrome: discussed use of warm compresses, non-preserved artificial tears, gel and PM ointment options.  Also discussed options utilizing medications.      Plan:  Initial impression:     S/p complex phaco/IOL OD (IFIS) 2/11/25  - PF QID OD --> taper to BID over 1 month & discussed  - Continue Xalatan qhs/qhs, Brim 2/0  - HOLDing  Cosopt 2/2,  mg PO BID    Subjective visual disturbance OD --> haze & decreased contrast // glare  2/2 Iris atrophy discussed  Discussed options:  Sunglasses // try FL-41  Dx CTL trial with artificial pupil --> wore CTL in past  Prosthetic pupil c Q & A    ==> FU with Dr Hodges  Dx CTL trial with artificial pupil --> wore CTL in past    ==> RTC judie Post @ 1 month Gonio, IOP, Taper PF 1% to BID and Iritis check  RTC sooner prn with good understanding      Today's visit is associated with current and anticipated ongoing medical care related to this patient's single serious/complex condition (glaucoma). Follow up is to be continued indefinitely to monitor the condition.        Terrance Post MD  [facility]

## 2025-05-23 NOTE — TELEPHONE ENCOUNTER
Care Due:                  Date            Visit Type   Department     Provider  --------------------------------------------------------------------------------                                MYCHART                              ANNUAL       [facility]                              CHECKUP/PHY  / INTERNAL MED Julian Schulz  Last Visit: 11-      S            / PEDS         [place]  Next Visit: None Scheduled  None         None Found                                                            Last  Test          Frequency    Reason                     Performed    Due Date  --------------------------------------------------------------------------------    Mg Level....  12 months..  ibandronate..............  Not Found    Overdue    Phosphate...  12 months..  ibandronate..............  Not Found    Overdue    [employer] Embedded Care Due Messages. Reference number: [id number].   5/23/2025 5:42:56 PM CDT

## 2025-05-26 RX ORDER — BUTALBITAL, ACETAMINOPHEN AND CAFFEINE 50; 325; 40 MG/1; MG/1; MG/1
1 TABLET ORAL EVERY 4 HOURS PRN
Qty: 30 TABLET | Refills: 3 | Status: SHIPPED | OUTPATIENT
Start: 2025-05-26

## 2025-06-02 ENCOUNTER — TELEPHONE (OUTPATIENT)
Dept: FAMILY MEDICINE | Facility: CLINIC | Age: 75
End: 2025-06-02
Payer: MEDICARE

## 2025-06-03 RX ORDER — BUTALBITAL, ACETAMINOPHEN AND CAFFEINE 50; 325; 40 MG/1; MG/1; MG/1
1 TABLET ORAL EVERY 4 HOURS PRN
Qty: 30 TABLET | Refills: 3 | Status: SHIPPED | OUTPATIENT
Start: 2025-06-03 | End: 2025-06-03 | Stop reason: SDUPTHER

## 2025-06-03 RX ORDER — BUTALBITAL, ACETAMINOPHEN AND CAFFEINE 50; 325; 40 MG/1; MG/1; MG/1
1 TABLET ORAL EVERY 4 HOURS PRN
Qty: 30 TABLET | Refills: 3 | Status: SHIPPED | OUTPATIENT
Start: 2025-06-03

## 2025-06-26 RX ORDER — BRIMONIDINE TARTRATE 2 MG/ML
SOLUTION/ DROPS OPHTHALMIC
Qty: 5 ML | Refills: 1 | Status: SHIPPED | OUTPATIENT
Start: 2025-06-26

## 2025-07-08 ENCOUNTER — TELEPHONE (OUTPATIENT)
Dept: OPHTHALMOLOGY | Facility: CLINIC | Age: 75
End: 2025-07-08
Payer: MEDICARE

## 2025-07-08 ENCOUNTER — OFFICE VISIT (OUTPATIENT)
Dept: OPHTHALMOLOGY | Facility: CLINIC | Age: 75
End: 2025-07-08
Payer: MEDICARE

## 2025-07-08 DIAGNOSIS — H53.10 SUBJECTIVE VISUAL DISTURBANCE OF RIGHT EYE: Primary | ICD-10-CM

## 2025-07-08 DIAGNOSIS — H21.261 IRIS ATROPHY, RIGHT: ICD-10-CM

## 2025-07-08 DIAGNOSIS — H40.1494 PSEUDOEXFOLIATION (PXF) GLAUCOMA, INDETERMINATE STAGE: ICD-10-CM

## 2025-07-08 DIAGNOSIS — Z96.1 PSEUDOPHAKIA OF RIGHT EYE: ICD-10-CM

## 2025-07-08 DIAGNOSIS — H25.12 NUCLEAR SCLEROTIC CATARACT OF LEFT EYE: ICD-10-CM

## 2025-07-08 PROCEDURE — 1101F PT FALLS ASSESS-DOCD LE1/YR: CPT | Mod: CPTII,HCNC,S$GLB, | Performed by: OPHTHALMOLOGY

## 2025-07-08 PROCEDURE — 99999 PR PBB SHADOW E&M-EST. PATIENT-LVL III: CPT | Mod: PBBFAC,HCNC,, | Performed by: OPHTHALMOLOGY

## 2025-07-08 PROCEDURE — 1159F MED LIST DOCD IN RCRD: CPT | Mod: CPTII,HCNC,S$GLB, | Performed by: OPHTHALMOLOGY

## 2025-07-08 PROCEDURE — 1126F AMNT PAIN NOTED NONE PRSNT: CPT | Mod: CPTII,HCNC,S$GLB, | Performed by: OPHTHALMOLOGY

## 2025-07-08 PROCEDURE — 1160F RVW MEDS BY RX/DR IN RCRD: CPT | Mod: CPTII,HCNC,S$GLB, | Performed by: OPHTHALMOLOGY

## 2025-07-08 PROCEDURE — 3288F FALL RISK ASSESSMENT DOCD: CPT | Mod: CPTII,HCNC,S$GLB, | Performed by: OPHTHALMOLOGY

## 2025-07-08 PROCEDURE — 99024 POSTOP FOLLOW-UP VISIT: CPT | Mod: HCNC,S$GLB,, | Performed by: OPHTHALMOLOGY

## 2025-07-08 NOTE — PROGRESS NOTES
HPI     Glaucoma     Additional comments: 2 mon iop chk/gonio           Comments    Pt states she continues to have photophobia in OD- nothing new.    Meds; Xalatan QHS OU   Brimonidine BID OD                  Last edited by Ramiro Cervantes on 7/8/2025  3:44 PM.            Assessment /Plan     For exam results, see Encounter Report.    Subjective visual disturbance of right eye    Pseudophakia of right eye    Nuclear sclerotic cataract of left eye    Iris atrophy, right    Pseudoexfoliation (PXF) glaucoma, indeterminate stage      Subjective:  HPI     Glaucoma     Additional comments: 2 mon iop chk/gonio           Comments    Pt states she continues to have photophobia in OD- nothing new.    Meds; Xalatan QHS OU   Brimonidine BID OD                  Last edited by Ramiro Cervantes on 7/8/2025  3:44 PM.        Exam:  See encounter report for full exam    Assessment:  1. Chronic angle-closure glaucoma of right eye, indeterminate stage  2. Chronic angle-closure glaucoma of left eye, indeterminate stage  OD>>OS  Mild hyperopia  - Referral from: Dr. Verdin Establishing me 1/2025.  - PMH: hypothyroidism, osteoporosis, headaches, HLD, depression  - Synopsis: diagnosed with glaucoma 2/2024  - H/o migraines  - Surg hx:   Complex phaco/IOL (IFIS) 2/11/25 Coulon - done for narrow AC/phacomorphic IOP  - Laser hx:    YAG OD 4/15/25 Coulon  - Glaucoma FHx: denies  -  / 620  - Gonio: closed OD, narrow OS (Coulon 1/2025)  - Tmax: 32/21  - Target IOP: pending  - Med adverse effects: diamox (intolerant)  - Medication hx: latanoprost 2/2024, cosopt 12/2024    Baseline photos pending      S/p complex phaco/IOL OD (IFIS) 2/11/25  Quiet    Subjective visual disturbance OD --> haze & decreased contrast // glare  2/2 Iris atrophy discussed  Re-Discussed options:  Sunglasses // try FL-41 --> patient will obtain  Dx CTL trial with artificial pupil --> wore CTL in past & consider PRN as discussed  Consider Prosthetic pupil c Q & A    Both eye  --> tolerating well & good adherence --> CSM  Xal q day    Right eye  --> tolerating well & good adherence --> adjust  Brim 2/0  PF 1% BID --> q day    Last:  HVF not scheduled  OCT 2/2024 reviewed: thinning c/w glaucoma OD>OS  OCT today: sup<inf thinning, avg 56 OD  ST thinning, avg 84 -- baseline BMO, appears worse c/w RNFL OU 2/2024    3. Pseudophakia, right eye  4. 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 cataract, left eye  Visually significant OS, causing phacomorphic component of angle closure  - Guttae, PXF, or phacodonesis: none  - Dilation: deferred/very narrow  - Lens: OD DIBOO 24.5, MA60AC 23.5; OS DIBOO 25.5, MA60AC 24.5  - Veracity predicted MRx: OD -0.43 + 0.34 x 144° (SE -0.26); OS -0.54 + 0.50 x 10° (SE -0.29)    07/08/2025  S/p complex phaco/IOL OD (IFIS) 2/11/25      5. Dry eye syndrome of both eyes  Dry Eye Syndrome: discussed use of warm compresses, non-preserved artificial tears, gel and PM ointment options.  Also discussed options utilizing medications.      Plan:    ==> FU with Dr Tracie Gupta CTL trial with artificial pupil --> wore CTL in past    ==> RTC judie Post @ 1 month  IOP, Taper PF 1% to q day and Iritis check  RTC sooner prn with good understanding      Today's visit is associated with current and anticipated ongoing medical care related to this patient's single serious/complex condition (glaucoma). Follow up is to be continued indefinitely to monitor the condition.        Terrance Post MD  [facility]

## 2025-07-08 NOTE — TELEPHONE ENCOUNTER
Pt showed for their appointment with Dr. Post.    Copied from CRM #[id number]. Topic: General Inquiry - Patient Advice  >> Jul 8, 2025  1:03 PM Mary wrote:  CONSULT/ADVISORY    Name of Caller: Aleksandra    Contact Preference: [phone] (home)      Nature of Call: Please disregard previous message pt will try and make this appt

## 2025-07-08 NOTE — TELEPHONE ENCOUNTER
Pt showed for their appointment with Dr. Post.    Copied from CRM #[id number]. Topic: Appointments - Appointment Access  >> Jul 8, 2025 12:59 PM Sharyn wrote:  Pt is calling to reschedule appt 7/8, asking for call back

## 2025-07-18 ENCOUNTER — E-VISIT (OUTPATIENT)
Dept: FAMILY MEDICINE | Facility: CLINIC | Age: 75
End: 2025-07-18
Payer: MEDICARE

## 2025-07-18 DIAGNOSIS — J32.9 SINUSITIS, UNSPECIFIED CHRONICITY, UNSPECIFIED LOCATION: Primary | ICD-10-CM

## 2025-07-18 RX ORDER — AMOXICILLIN AND CLAVULANATE POTASSIUM 875; 125 MG/1; MG/1
1 TABLET, FILM COATED ORAL 2 TIMES DAILY
Qty: 20 TABLET | Refills: 0 | Status: SHIPPED | OUTPATIENT
Start: 2025-07-18 | End: 2025-07-28

## 2025-07-18 NOTE — PROGRESS NOTES
Patient ID: Aleksandra Santana is a 75 y.o. female.        E-Visit Time Tracking:             Chief Complaint: General Illness (Entered automatically based on patient selection in [employer].)      The patient initiated a request through [employer] on 7/18/2025 for evaluation and management with a chief complaint of General Illness (Entered automatically based on patient selection in [employer].)     I evaluated the questionnaire submission on 07/18/2025.    [facility]-Cough And Cold    7/18/2025  2:48 PM CDT - Filed by Patient   What do you need help with? Sinus Infection   Do you agree to participate in an E-Visit? Yes   If you have any of the following symptoms, go to your local emergency room or call 911: I acknowledge   What is the main issue you would like addressed today? Sinus infection. Runny nose sneezing sinus congestion Gabriel g headaches slight off balance .. s catchy throat. No fever, but I never run fever   Sane symptoms as last 10/24.  Augmentin  amoxicillin/clav 875-125 was prescribed   This is what works ..[facility]   Do you think you might have COVID-19 or the Flu? (COVID-19, Flu, No, Not sure) No   What symptoms do you currently have?(Chills, Cough, Diarrhea, Fatigue, Headache, Stuffy nose, Loss of taste or smell, Muscle or body aches, Nausea, Runny nose, Sore throat, Face and nose pain, Ear pain, Neck pain, None) Headache;  Stuffy nose;  Runny nose;  Neck pain   Have you ever smoked?(Smoked in the past, Currently smoke, Never smoked) Never smoked   What has been the range of your fever?(Below 100.4, 100.4 or higher, Not sure) No   When did your concern begin? 7/14/2025   In the last two weeks, have you been in close contact with someone who has COVID-19, the Flu, or strep throat? Not sure   What have you tried to help your symptoms? Drinking more fluids;  Other   List what you have done or taken to help your symptoms Tylenol zyzal nose spray  negative Covid test meclizine   On a scale of 1-10, where  10 is the worst you can imagine, how severe are your symptoms? (range: 1 - 10) 7   Have your symptoms changed since they first started?(Improved, Worsened, No change) No change   Do you have transportation to an [facility] location to get tested for COVID-19? No   Provide any additional information you feel is important. Negative home Covid test ; Also taken meclizine along with other indicated rx   Please attach any relevant images or files    Are you able to take your vitals? Yes   Systolic Blood Pressure 137   Diastolic Blood Pressure 64   Weight: 140   Height: 61   Pluse: 61   Temp 97.4   Resp:    SpO2          Encounter Diagnosis   Name Primary?    Sinusitis, unspecified chronicity, unspecified location Yes        No orders of the defined types were placed in this encounter.     Medications Ordered This Encounter   Medications    amoxicillin-clavulanate 875-125mg (AUGMENTIN) 875-125 mg per tablet     Sig: Take 1 tablet by mouth 2 (two) times daily. for 10 days     Dispense:  20 tablet     Refill:  0        No follow-ups on file.

## 2025-07-18 NOTE — PROGRESS NOTES
Patient ID: Aleksandra Santana is a 75 y.o. female.    Chief Complaint: General Illness (Entered automatically based on patient selection in [employer].)    The patient initiated a request through [employer] on 7/18/2025 for evaluation and management with a chief complaint of General Illness (Entered automatically based on patient selection in [employer].)     I evaluated the questionnaire submission on 7/18.    [facility]-Cough And Cold    7/18/2025  2:48 PM CDT - Filed by Patient   What do you need help with? Sinus Infection   Do you agree to participate in an E-Visit? Yes   If you have any of the following symptoms, go to your local emergency room or call 911: I acknowledge   What is the main issue you would like addressed today? Sinus infection. Runny nose sneezing sinus congestion Gabriel g headaches slight off balance .. s catchy throat. No fever, but I never run fever   Sane symptoms as last 10/24.  Augmentin  amoxicillin/clav 875-125 was prescribed   This is what works ..[facility]   Do you think you might have COVID-19 or the Flu? (COVID-19, Flu, No, Not sure) No   What symptoms do you currently have?(Chills, Cough, Diarrhea, Fatigue, Headache, Stuffy nose, Loss of taste or smell, Muscle or body aches, Nausea, Runny nose, Sore throat, Face and nose pain, Ear pain, Neck pain, None) Headache;  Stuffy nose;  Runny nose;  Neck pain   Have you ever smoked?(Smoked in the past, Currently smoke, Never smoked) Never smoked   What has been the range of your fever?(Below 100.4, 100.4 or higher, Not sure) No   When did your concern begin? 7/14/2025   In the last two weeks, have you been in close contact with someone who has COVID-19, the Flu, or strep throat? Not sure   What have you tried to help your symptoms? Drinking more fluids;  Other   List what you have done or taken to help your symptoms Tylenol zyzal nose spray  negative Covid test meclizine   On a scale of 1-10, where 10 is the worst you can imagine, how severe are  your symptoms? (range: 1 - 10) 7   Have your symptoms changed since they first started?(Improved, Worsened, No change) No change   Do you have transportation to an [facility] location to get tested for COVID-19? No   Provide any additional information you feel is important. Negative home Covid test ; Also taken meclizine along with other indicated rx   Please attach any relevant images or files    Are you able to take your vitals? Yes   Systolic Blood Pressure 137   Diastolic Blood Pressure 64   Weight: 140   Height: 61   Pluse: 61   Temp 97.4   Resp:    SpO2          Encounter Diagnosis   Name Primary?    Sinusitis, unspecified chronicity, unspecified location Yes        No orders of the defined types were placed in this encounter.     Medications Ordered This Encounter   Medications    amoxicillin-clavulanate 875-125mg (AUGMENTIN) 875-125 mg per tablet     Sig: Take 1 tablet by mouth 2 (two) times daily. for 10 days     Dispense:  20 tablet     Refill:  0        No follow-ups on file.      E-Visit Time Tracking:  Over 21 minutes reviewing issue and addressing problem, sending medication to the pharmacy, reviewing allergies and sending a very detailed message back to the patient regarding symptoms and treatment

## 2025-08-13 DIAGNOSIS — H40.1131 PRIMARY OPEN ANGLE GLAUCOMA (POAG) OF BOTH EYES, MILD STAGE: Primary | ICD-10-CM

## 2025-08-14 RX ORDER — BRIMONIDINE TARTRATE 2 MG/ML
1 SOLUTION/ DROPS OPHTHALMIC 2 TIMES DAILY
Qty: 15 ML | Refills: 4 | Status: SHIPPED | OUTPATIENT
Start: 2025-08-14

## 2025-08-26 ENCOUNTER — OFFICE VISIT (OUTPATIENT)
Dept: FAMILY MEDICINE | Facility: CLINIC | Age: 75
End: 2025-08-26
Payer: MEDICARE

## 2025-08-26 ENCOUNTER — TELEPHONE (OUTPATIENT)
Dept: ENDOSCOPY | Facility: HOSPITAL | Age: 75
End: 2025-08-26
Payer: MEDICARE

## 2025-08-26 VITALS
WEIGHT: 143.75 LBS | HEIGHT: 61 IN | TEMPERATURE: 98 F | SYSTOLIC BLOOD PRESSURE: 104 MMHG | DIASTOLIC BLOOD PRESSURE: 58 MMHG | HEART RATE: 58 BPM | BODY MASS INDEX: 27.14 KG/M2 | OXYGEN SATURATION: 95 %

## 2025-08-26 DIAGNOSIS — K21.9 GASTROESOPHAGEAL REFLUX DISEASE WITHOUT ESOPHAGITIS: ICD-10-CM

## 2025-08-26 DIAGNOSIS — I10 ESSENTIAL HYPERTENSION: ICD-10-CM

## 2025-08-26 DIAGNOSIS — D64.9 ANEMIA, UNSPECIFIED TYPE: ICD-10-CM

## 2025-08-26 DIAGNOSIS — M81.0 OSTEOPOROSIS, POSTMENOPAUSAL: ICD-10-CM

## 2025-08-26 DIAGNOSIS — E78.2 MIXED HYPERLIPIDEMIA: ICD-10-CM

## 2025-08-26 DIAGNOSIS — K59.09 CHRONIC CONSTIPATION: ICD-10-CM

## 2025-08-26 DIAGNOSIS — F32.A DEPRESSION, UNSPECIFIED DEPRESSION TYPE: ICD-10-CM

## 2025-08-26 DIAGNOSIS — K58.1 IRRITABLE BOWEL SYNDROME WITH CONSTIPATION: ICD-10-CM

## 2025-08-26 DIAGNOSIS — E55.9 VITAMIN D DEFICIENCY DISEASE: ICD-10-CM

## 2025-08-26 DIAGNOSIS — G43.909 MIGRAINE SYNDROME: ICD-10-CM

## 2025-08-26 DIAGNOSIS — L98.9 SKIN LESION: ICD-10-CM

## 2025-08-26 DIAGNOSIS — Z86.0100 HISTORY OF COLONIC POLYPS: ICD-10-CM

## 2025-08-26 DIAGNOSIS — R42 VERTIGO: ICD-10-CM

## 2025-08-26 DIAGNOSIS — E03.9 HYPOTHYROIDISM, UNSPECIFIED TYPE: ICD-10-CM

## 2025-08-26 DIAGNOSIS — F39 MOOD DISORDER: ICD-10-CM

## 2025-08-26 DIAGNOSIS — Z12.31 ENCOUNTER FOR SCREENING MAMMOGRAM FOR BREAST CANCER: ICD-10-CM

## 2025-08-26 DIAGNOSIS — Z00.00 ROUTINE MEDICAL EXAM: Primary | ICD-10-CM

## 2025-08-26 PROCEDURE — 99999 PR PBB SHADOW E&M-EST. PATIENT-LVL IV: CPT | Mod: PBBFAC,HCNC,, | Performed by: INTERNAL MEDICINE

## 2025-08-26 RX ORDER — IBANDRONATE SODIUM 150 MG/1
150 TABLET, FILM COATED ORAL
Qty: 3 TABLET | Refills: 11 | Status: SHIPPED | OUTPATIENT
Start: 2025-08-26

## 2025-08-28 ENCOUNTER — LAB VISIT (OUTPATIENT)
Dept: LAB | Facility: HOSPITAL | Age: 75
End: 2025-08-28
Attending: INTERNAL MEDICINE
Payer: MEDICARE

## 2025-08-28 DIAGNOSIS — E78.2 MIXED HYPERLIPIDEMIA: ICD-10-CM

## 2025-08-28 DIAGNOSIS — E55.9 VITAMIN D DEFICIENCY DISEASE: ICD-10-CM

## 2025-08-28 DIAGNOSIS — D64.9 ANEMIA, UNSPECIFIED TYPE: ICD-10-CM

## 2025-08-28 DIAGNOSIS — Z00.00 ROUTINE MEDICAL EXAM: ICD-10-CM

## 2025-08-28 DIAGNOSIS — E03.9 HYPOTHYROIDISM, UNSPECIFIED TYPE: ICD-10-CM

## 2025-08-28 LAB
25(OH)D3+25(OH)D2 SERPL-MCNC: 43 NG/ML (ref 30–96)
ABSOLUTE EOSINOPHIL (OHS): 0.28 K/UL
ABSOLUTE MONOCYTE (OHS): 0.58 K/UL (ref 0.3–1)
ABSOLUTE NEUTROPHIL COUNT (OHS): 2.94 K/UL (ref 1.8–7.7)
ALBUMIN SERPL BCP-MCNC: 3.8 G/DL (ref 3.5–5.2)
ALP SERPL-CCNC: 40 UNIT/L (ref 40–150)
ALT SERPL W/O P-5'-P-CCNC: 11 UNIT/L (ref 0–55)
ANION GAP (OHS): 8 MMOL/L (ref 8–16)
AST SERPL-CCNC: 19 UNIT/L (ref 0–50)
BASOPHILS # BLD AUTO: 0.08 K/UL
BASOPHILS NFR BLD AUTO: 1.3 %
BILIRUB SERPL-MCNC: 0.3 MG/DL (ref 0.1–1)
BUN SERPL-MCNC: 18 MG/DL (ref 8–23)
CALCIUM SERPL-MCNC: 9.7 MG/DL (ref 8.7–10.5)
CHLORIDE SERPL-SCNC: 103 MMOL/L (ref 95–110)
CHOLEST SERPL-MCNC: 291 MG/DL (ref 120–199)
CHOLEST/HDLC SERPL: 2.5 {RATIO} (ref 2–5)
CO2 SERPL-SCNC: 28 MMOL/L (ref 23–29)
CREAT SERPL-MCNC: 0.7 MG/DL (ref 0.5–1.4)
ERYTHROCYTE [DISTWIDTH] IN BLOOD BY AUTOMATED COUNT: 13.2 % (ref 11.5–14.5)
GFR SERPLBLD CREATININE-BSD FMLA CKD-EPI: >60 ML/MIN/1.73/M2
GLUCOSE SERPL-MCNC: 84 MG/DL (ref 70–110)
HCT VFR BLD AUTO: 39 % (ref 37–48.5)
HDLC SERPL-MCNC: 118 MG/DL (ref 40–75)
HDLC SERPL: 40.5 % (ref 20–50)
HGB BLD-MCNC: 12.8 GM/DL (ref 12–16)
IMM GRANULOCYTES # BLD AUTO: 0.01 K/UL (ref 0–0.04)
IMM GRANULOCYTES NFR BLD AUTO: 0.2 % (ref 0–0.5)
LDLC SERPL CALC-MCNC: 155.2 MG/DL (ref 63–159)
LYMPHOCYTES # BLD AUTO: 2.5 K/UL (ref 1–4.8)
MCH RBC QN AUTO: 32.1 PG (ref 27–31)
MCHC RBC AUTO-ENTMCNC: 32.8 G/DL (ref 32–36)
MCV RBC AUTO: 98 FL (ref 82–98)
NONHDLC SERPL-MCNC: 173 MG/DL
NUCLEATED RBC (/100WBC) (OHS): 0 /100 WBC
PLATELET # BLD AUTO: 310 K/UL (ref 150–450)
PMV BLD AUTO: 10.3 FL (ref 9.2–12.9)
POTASSIUM SERPL-SCNC: 3.8 MMOL/L (ref 3.5–5.1)
PROT SERPL-MCNC: 7.2 GM/DL (ref 6–8.4)
RBC # BLD AUTO: 3.99 M/UL (ref 4–5.4)
RELATIVE EOSINOPHIL (OHS): 4.4 %
RELATIVE LYMPHOCYTE (OHS): 39.1 % (ref 18–48)
RELATIVE MONOCYTE (OHS): 9.1 % (ref 4–15)
RELATIVE NEUTROPHIL (OHS): 45.9 % (ref 38–73)
SODIUM SERPL-SCNC: 139 MMOL/L (ref 136–145)
T4 FREE SERPL-MCNC: 1.1 NG/DL (ref 0.71–1.51)
TRIGL SERPL-MCNC: 89 MG/DL (ref 30–150)
TSH SERPL-ACNC: 0.79 UIU/ML (ref 0.4–4)
WBC # BLD AUTO: 6.39 K/UL (ref 3.9–12.7)

## 2025-08-28 PROCEDURE — 36415 COLL VENOUS BLD VENIPUNCTURE: CPT | Mod: HCNC,PO

## 2025-08-28 PROCEDURE — 84439 ASSAY OF FREE THYROXINE: CPT | Mod: HCNC

## 2025-08-28 PROCEDURE — 84443 ASSAY THYROID STIM HORMONE: CPT | Mod: HCNC

## 2025-08-28 PROCEDURE — 82306 VITAMIN D 25 HYDROXY: CPT | Mod: HCNC

## 2025-08-28 PROCEDURE — 85025 COMPLETE CBC W/AUTO DIFF WBC: CPT | Mod: HCNC

## 2025-08-28 PROCEDURE — 80061 LIPID PANEL: CPT | Mod: HCNC

## 2025-08-28 PROCEDURE — 80053 COMPREHEN METABOLIC PANEL: CPT | Mod: HCNC

## 2025-08-29 DIAGNOSIS — Z96.1 PSEUDOPHAKIA OF RIGHT EYE: ICD-10-CM

## 2025-09-02 RX ORDER — PREDNISOLONE ACETATE 10 MG/ML
1 SUSPENSION/ DROPS OPHTHALMIC DAILY
Qty: 5 ML | Refills: 1 | Status: SHIPPED | OUTPATIENT
Start: 2025-09-02

## (undated) DEVICE — GLOVE BIOGEL SKINSENSE PI 6.5

## (undated) DEVICE — NDL HYPO REG 25G X 1 1/2

## (undated) DEVICE — SOL WATER STRL IRR 1000ML

## (undated) DEVICE — CANNULA NUCLEUS HYRDODISECTOR

## (undated) DEVICE — DRAPE OPHTHALMIC 48X62 FEN

## (undated) DEVICE — DRESSING TRANS 2X2 TEGADERM

## (undated) DEVICE — NDL FLTR 5MCRN BLNT TIP 18GX1

## (undated) DEVICE — CLOSURE SKIN STERI STRIP 1/2X4

## (undated) DEVICE — SOL BETADINE 5%

## (undated) DEVICE — KNIFE OPHTHALMIC 15 DEG

## (undated) DEVICE — DRESSING TRANS 4X4 TEGADERM